# Patient Record
Sex: MALE | Race: WHITE | Employment: OTHER | URBAN - METROPOLITAN AREA
[De-identification: names, ages, dates, MRNs, and addresses within clinical notes are randomized per-mention and may not be internally consistent; named-entity substitution may affect disease eponyms.]

---

## 2018-03-22 ENCOUNTER — HOSPITAL ENCOUNTER (EMERGENCY)
Facility: CLINIC | Age: 83
Discharge: HOME | End: 2018-03-22
Attending: EMERGENCY MEDICINE | Admitting: EMERGENCY MEDICINE
Payer: MEDICARE

## 2018-03-22 ENCOUNTER — APPOINTMENT (OUTPATIENT)
Dept: GENERAL RADIOLOGY | Facility: CLINIC | Age: 83
End: 2018-03-22
Attending: EMERGENCY MEDICINE
Payer: MEDICARE

## 2018-03-22 VITALS
RESPIRATION RATE: 10 BRPM | TEMPERATURE: 98.7 F | DIASTOLIC BLOOD PRESSURE: 73 MMHG | SYSTOLIC BLOOD PRESSURE: 144 MMHG | OXYGEN SATURATION: 95 %

## 2018-03-22 DIAGNOSIS — I45.10 RIGHT BUNDLE BRANCH BLOCK: ICD-10-CM

## 2018-03-22 DIAGNOSIS — M25.512 LEFT SHOULDER PAIN: ICD-10-CM

## 2018-03-22 DIAGNOSIS — M62.81 GENERALIZED MUSCLE WEAKNESS: ICD-10-CM

## 2018-03-22 LAB
ALBUMIN SERPL-MCNC: 3.2 G/DL (ref 3.4–5)
ALP SERPL-CCNC: 74 U/L (ref 40–150)
ALT SERPL W P-5'-P-CCNC: 27 U/L (ref 0–70)
ANION GAP SERPL CALCULATED.3IONS-SCNC: 9 MMOL/L (ref 3–14)
AST SERPL W P-5'-P-CCNC: 25 U/L (ref 0–45)
BASOPHILS # BLD AUTO: 0 10E9/L (ref 0–0.2)
BASOPHILS NFR BLD AUTO: 0.6 %
BILIRUB SERPL-MCNC: 1.5 MG/DL (ref 0.2–1.3)
BUN SERPL-MCNC: 19 MG/DL (ref 7–30)
CALCIUM SERPL-MCNC: 8.5 MG/DL (ref 8.5–10.1)
CHLORIDE SERPL-SCNC: 104 MMOL/L (ref 94–109)
CO2 SERPL-SCNC: 23 MMOL/L (ref 20–32)
CREAT SERPL-MCNC: 0.98 MG/DL (ref 0.66–1.25)
DIFFERENTIAL METHOD BLD: NORMAL
EOSINOPHIL # BLD AUTO: 0.1 10E9/L (ref 0–0.7)
EOSINOPHIL NFR BLD AUTO: 1.9 %
ERYTHROCYTE [DISTWIDTH] IN BLOOD BY AUTOMATED COUNT: 12.3 % (ref 10–15)
GFR SERPL CREATININE-BSD FRML MDRD: 73 ML/MIN/1.7M2
GLUCOSE SERPL-MCNC: 258 MG/DL (ref 70–99)
HCT VFR BLD AUTO: 48.4 % (ref 40–53)
HGB BLD-MCNC: 16.8 G/DL (ref 13.3–17.7)
IMM GRANULOCYTES # BLD: 0 10E9/L (ref 0–0.4)
IMM GRANULOCYTES NFR BLD: 0.3 %
LYMPHOCYTES # BLD AUTO: 1.3 10E9/L (ref 0.8–5.3)
LYMPHOCYTES NFR BLD AUTO: 18.5 %
MCH RBC QN AUTO: 30.9 PG (ref 26.5–33)
MCHC RBC AUTO-ENTMCNC: 34.7 G/DL (ref 31.5–36.5)
MCV RBC AUTO: 89 FL (ref 78–100)
MONOCYTES # BLD AUTO: 0.6 10E9/L (ref 0–1.3)
MONOCYTES NFR BLD AUTO: 8.3 %
NEUTROPHILS # BLD AUTO: 5.1 10E9/L (ref 1.6–8.3)
NEUTROPHILS NFR BLD AUTO: 70.4 %
PLATELET # BLD AUTO: 159 10E9/L (ref 150–450)
POTASSIUM SERPL-SCNC: 4.4 MMOL/L (ref 3.4–5.3)
PROT SERPL-MCNC: 7 G/DL (ref 6.8–8.8)
RBC # BLD AUTO: 5.43 10E12/L (ref 4.4–5.9)
SODIUM SERPL-SCNC: 136 MMOL/L (ref 133–144)
TROPONIN I SERPL-MCNC: <0.015 UG/L (ref 0–0.04)
TROPONIN I SERPL-MCNC: <0.015 UG/L (ref 0–0.04)
WBC # BLD AUTO: 7.2 10E9/L (ref 4–11)

## 2018-03-22 PROCEDURE — 80053 COMPREHEN METABOLIC PANEL: CPT | Performed by: EMERGENCY MEDICINE

## 2018-03-22 PROCEDURE — 71046 X-RAY EXAM CHEST 2 VIEWS: CPT

## 2018-03-22 PROCEDURE — 99285 EMERGENCY DEPT VISIT HI MDM: CPT | Mod: 25

## 2018-03-22 PROCEDURE — 93010 ELECTROCARDIOGRAM REPORT: CPT | Mod: Z6 | Performed by: EMERGENCY MEDICINE

## 2018-03-22 PROCEDURE — 84484 ASSAY OF TROPONIN QUANT: CPT | Performed by: EMERGENCY MEDICINE

## 2018-03-22 PROCEDURE — 85025 COMPLETE CBC W/AUTO DIFF WBC: CPT | Performed by: EMERGENCY MEDICINE

## 2018-03-22 PROCEDURE — 99285 EMERGENCY DEPT VISIT HI MDM: CPT | Mod: 25 | Performed by: EMERGENCY MEDICINE

## 2018-03-22 PROCEDURE — 93005 ELECTROCARDIOGRAM TRACING: CPT

## 2018-03-22 RX ORDER — ONDANSETRON 4 MG/1
4 TABLET, ORALLY DISINTEGRATING ORAL EVERY 8 HOURS PRN
Qty: 10 TABLET | Refills: 0 | Status: SHIPPED | OUTPATIENT
Start: 2018-03-22 | End: 2018-03-25

## 2018-03-22 NOTE — DISCHARGE INSTRUCTIONS
Return to the emergency department if you have worsening symptoms, difficulty breathing, repeated vomiting, fever, or other concerns.  Otherwise follow-up in clinic in 1-2 weeks for a recheck.

## 2018-03-22 NOTE — ED AVS SNAPSHOT
[facility] Emergency Department    [address] [address]    Phone:  [phone]    Fax:  [phone]                                       Jhoan Curran   MRN: [id number]    Department:  [facility] Emergency Department   Date of Visit:  3/22/2018           After Visit Summary Signature Page     I have received my discharge instructions, and my questions have been answered. I have discussed any challenges I see with this plan with the nurse or doctor.    ..........................................................................................................................................  Patient/Patient Representative Signature      ..........................................................................................................................................  Patient Representative Print Name and Relationship to Patient    ..................................................               ................................................  Date                                            Time    ..........................................................................................................................................  Reviewed by Signature/Title    ...................................................              ..............................................  Date                                                            Time

## 2018-03-22 NOTE — ED AVS SNAPSHOT
[facility] Emergency Department    [address] [address]    Phone:  [phone]    Fax:  [phone]                                       Jhoan Curran   MRN: [id number]    Department:  [facility] Emergency Department   Date of Visit:  3/22/2018           Patient Information     Date Of Birth          2/19/1932        Your diagnoses for this visit were:     Generalized muscle weakness        You were seen by Raffi Triplett MD.        Discharge Instructions       Return to the emergency department if you have worsening symptoms, difficulty breathing, repeated vomiting, fever, or other concerns.  Otherwise follow-up in clinic in 1-2 weeks for a recheck.    24 Hour Appointment Hotline       To make an appointment at any [facility], call [phone] ([phone]). If you don't have a family doctor or clinic, we will help you find one. [place] clinics are conveniently located to serve the needs of you and your family.             Review of your medicines      START taking        Dose / Directions Last dose taken    ondansetron 4 MG ODT tab   Commonly known as:  ZOFRAN ODT   Dose:  4 mg   Quantity:  10 tablet        Take 1 tablet (4 mg) by mouth every 8 hours as needed   Refills:  0          Our records show that you are taking the medicines listed below. If these are incorrect, please call your family doctor or clinic.        Dose / Directions Last dose taken    atenolol 25 MG tablet   Commonly known as:  TENORMIN   Dose:  25 mg        Take 25 mg by mouth daily   Refills:  0        BABY ASPIRIN 81 MG chewable tablet   Dose:  81 mg   Generic drug:  aspirin        Take 81 mg by mouth daily.   Refills:  0        fish oil-omega-3 fatty acids 1000 MG capsule   Dose:  2 g        Take 2 g by mouth daily.   Refills:  0        insulin aspart 100 UNITS/ML injection   Commonly known as:  NovoLOG        Inject  Subcutaneous See Admin Instructions. dispense flex pen   Refills:   0        niacin 25 mg half-tablet   Dose:  50 mg        Take 50 mg by mouth daily (with breakfast).   Refills:  0        OMEPRAZOLE PO   Dose:  20 mg        Take 20 mg by mouth   Refills:  0        VALSARTAN PO   Dose:  320 mg        Take 320 mg by mouth   Refills:  0        ZOCOR PO   Dose:  40 mg        Take 40 mg by mouth   Refills:  0                Prescriptions were sent or printed at these locations (1 Prescription)                   [place], [place] - [address]   [address] [address]    Telephone:  [phone]   Fax:  [phone]   Hours:                  E-Prescribed (1 of 1)         ondansetron (ZOFRAN ODT) 4 MG ODT tab                Procedures and tests performed during your visit     Procedure/Test Number of Times Performed    CBC with platelets differential 1    Comprehensive metabolic panel 1    EKG 12-lead, tracing only 1    Troponin I 2    XR Chest 2 Views 1      Orders Needing Specimen Collection     None      Pending Results     No orders found from 3/20/2018 to 3/23/2018.            Pending Culture Results     No orders found from 3/20/2018 to 3/23/2018.            Pending Results Instructions     If you had any lab results that were not finalized at the time of your Discharge, you can call the ED Lab Result RN at [phone]. You will be contacted by this team for any positive Lab results or changes in treatment. The nurses are available 7 days a week from 10A to 6:30P.  You can leave a message 24 hours per day and they will return your call.        Test Results From Your Hospital Stay        3/22/2018 10:12 AM      Component Results     Component Value Ref Range & Units Status    WBC 7.2 4.0 - 11.0 10e9/L Final    RBC Count 5.43 4.4 - 5.9 10e12/L Final    Hemoglobin 16.8 13.3 - 17.7 g/dL Final    Hematocrit 48.4 40.0 - 53.0 % Final    MCV 89 78 - 100 fl Final    MCH 30.9 26.5 - 33.0 pg Final    MCHC 34.7 31.5 - 36.5 g/dL Final    RDW 12.3 10.0  - 15.0 % Final    Platelet Count 159 150 - 450 10e9/L Final    Diff Method Automated Method  Final    % Neutrophils 70.4 % Final    % Lymphocytes 18.5 % Final    % Monocytes 8.3 % Final    % Eosinophils 1.9 % Final    % Basophils 0.6 % Final    % Immature Granulocytes 0.3 % Final    Absolute Neutrophil 5.1 1.6 - 8.3 10e9/L Final    Absolute Lymphocytes 1.3 0.8 - 5.3 10e9/L Final    Absolute Monocytes 0.6 0.0 - 1.3 10e9/L Final    Absolute Eosinophils 0.1 0.0 - 0.7 10e9/L Final    Absolute Basophils 0.0 0.0 - 0.2 10e9/L Final    Abs Immature Granulocytes 0.0 0 - 0.4 10e9/L Final         3/22/2018 10:32 AM      Component Results     Component Value Ref Range & Units Status    Sodium 136 133 - 144 mmol/L Final    Potassium 4.4 3.4 - 5.3 mmol/L Final    Chloride 104 94 - 109 mmol/L Final    Carbon Dioxide 23 20 - 32 mmol/L Final    Anion Gap 9 3 - 14 mmol/L Final    Glucose 258 (H) 70 - 99 mg/dL Final    Urea Nitrogen 19 7 - 30 mg/dL Final    Creatinine 0.98 0.66 - 1.25 mg/dL Final    GFR Estimate 73 >60 mL/min/1.7m2 Final    Non  GFR Calc    GFR Estimate If Black 88 >60 mL/min/1.7m2 Final    African American GFR Calc    Calcium 8.5 8.5 - 10.1 mg/dL Final    Bilirubin Total 1.5 (H) 0.2 - 1.3 mg/dL Final    Albumin 3.2 (L) 3.4 - 5.0 g/dL Final    Protein Total 7.0 6.8 - 8.8 g/dL Final    Alkaline Phosphatase 74 40 - 150 U/L Final    ALT 27 0 - 70 U/L Final    AST 25 0 - 45 U/L Final         3/22/2018 10:33 AM      Component Results     Component Value Ref Range & Units Status    Troponin I ES <0.015 0.000 - 0.045 ug/L Final    The 99th percentile for upper reference range is 0.045 ug/L.  Troponin values   in the range of 0.045 - 0.120 ug/L may be associated with risks of adverse   clinical events.           3/22/2018 10:56 AM      Narrative     CHEST TWO VIEWS March 22, 2018 10:27 AM     HISTORY: \Weakness.    COMPARISON: 2/24/2009.    FINDINGS: Median sternotomy wires again noted. There has been  "prior  coronary artery bypass. Heart size and pulmonary vascularity are  within normal limits. The lungs are clear. No pneumothorax or pleural  effusion.         Impression     IMPRESSION: No radiographic evidence of acute chest abnormality.     CHAZ THORPE MD         3/22/2018  1:41 PM      Component Results     Component Value Ref Range & Units Status    Troponin I ES <0.015 0.000 - 0.045 ug/L Final    The 99th percentile for upper reference range is 0.045 ug/L.  Troponin values   in the range of 0.045 - 0.120 ug/L may be associated with risks of adverse   clinical events.                  Thank you for choosing [facility]       Thank you for choosing [facility] for your care. Our goal is always to provide you with excellent care. Hearing back from our patients is one way we can continue to improve our services. Please take a few minutes to complete the written survey that you may receive in the mail after you visit with us. Thank you!        [employer] Information     [employer] lets you send messages to your doctor, view your test results, renew your prescriptions, schedule appointments and more. To sign up, go to [email or web address] . Click on \"Log in\" on the left side of the screen, which will take you to the Welcome page. Then click on \"Sign up Now\" on the right side of the page.     You will be asked to enter the access code listed below, as well as some personal information. Please follow the directions to create your username and password.     Your access code is: [email or web address]  Expires: 2018  2:04 PM     Your access code will  in 90 days. If you need help or a new code, please call your [facility] clinic or [phone].        Care EveryWhere ID     This is your Care EveryWhere ID. This could be used by other organizations to access your [facility] medical records  [id number]        Equal Access to Services     ANDREAS HIDALGO AH: izaiah Moraes, raoul kohli " lesley miranda ah. So [facility] [phone].    ATENCIÓN: Si habla español, tiene a eduardo disposición servicios gratuitos de asistencia lingüística. Llame al [phone].    We comply with applicable federal civil rights laws and [place] laws. We do not discriminate on the basis of race, color, national origin, age, disability, sex, sexual orientation, or gender identity.            After Visit Summary       This is your record. Keep this with you and show to your community pharmacist(s) and doctor(s) at your next visit.

## 2018-03-22 NOTE — ED PROVIDER NOTES
History     Chief Complaint   Patient presents with     Generalized Weakness     HPI  Jhoan Curran is a 86 year old male who presents for generalized weakness and left shoulder pain.  History obtained from the patient and EMS.  Patient says that he has been feeling more run down over the past several weeks to months and has been having intermittent shoulder pain that he woke up with the shoulder pain on the left side this morning, nonradiating, slightly increased with use of the shoulder but denies any injury.  He has not taken anything for pain.  Pain is described as aching and pressure, rated as moderate.  Denies any fever, difficulty breathing, cough, abdominal pain, nausea, vomiting, diarrhea, dysuria, or rash.    Problem List:    Patient Active Problem List    Diagnosis Date Noted     Foreskin does not retract 02/11/2015     Priority: Medium        Past Medical History:    No past medical history on file.    Past Surgical History:    Past Surgical History:   Procedure Laterality Date     CIRCUMCISION N/A 3/19/2015    Procedure: CIRCUMCISION;  Surgeon: Bhargav Price MD;  Location: [facility] OR     GENITOURINARY SURGERY         Family History:    No family history on file.    Social History:  Marital Status:   [5]  Social History   Substance Use Topics     Smoking status: Never Smoker     Smokeless tobacco: Never Used     Alcohol use No        Medications:      ondansetron (ZOFRAN ODT) 4 MG ODT tab   atenolol (TENORMIN) 25 MG tablet   OMEPRAZOLE PO   Simvastatin (ZOCOR PO)   VALSARTAN PO   insulin aspart (NovoLOG) injection    fish oil-omega-3 fatty acids (FISH OIL) 1000 MG capsule   niacin 25 MG half-tablet   aspirin (BABY ASPIRIN) 81 MG chewable tablet         Review of Systems  Pertinent positives and negatives listed in the HPI, all other systems reviewed and are negative.    Physical Exam   BP: (!) 186/101  Heart Rate: 68  Temp: 98.7  F (37.1  C)  Resp: 16  SpO2: 92 %      Physical Exam    Constitutional: He is oriented to person, place, and time. He appears well-developed and well-nourished. He appears distressed.   HENT:   Head: Normocephalic and atraumatic.   Right Ear: External ear normal.   Left Ear: External ear normal.   Nose: Nose normal.   Eyes: Conjunctivae are normal. No scleral icterus.   Neck: Normal range of motion.   Cardiovascular: Normal rate and regular rhythm.    No murmur heard.  Pulses:       Radial pulses are 2+ on the right side, and 2+ on the left side.        Dorsalis pedis pulses are 2+ on the left side.   Pulmonary/Chest: Effort normal. No stridor. No respiratory distress. He has no wheezes.   Abdominal: Soft. He exhibits no distension. There is no tenderness. There is no rebound.   Neurological: He is alert and oriented to person, place, and time.   Skin: Skin is warm and dry. He is not diaphoretic.   Psychiatric: He has a normal mood and affect. His behavior is normal.   Nursing note and vitals reviewed.      ED Course     ED Course     Procedures               EKG Interpretation:      Interpreted by Raffi Triplett  Time reviewed: 1000  Symptoms at time of EKG: Weakness   Rhythm: 1 degree AV block  Rate: Normal  Axis: Left Axis Deviation  Ectopy: none  Conduction: right bundle branch block (complete)  ST Segments/ T Waves: No acute ischemic changes  Comparison to prior: No old EKG available    Clinical Impression: RBBB      Critical Care time:  none               Results for orders placed or performed during the hospital encounter of 03/22/18 (from the past 24 hour(s))   CBC with platelets differential   Result Value Ref Range    WBC 7.2 4.0 - 11.0 10e9/L    RBC Count 5.43 4.4 - 5.9 10e12/L    Hemoglobin 16.8 13.3 - 17.7 g/dL    Hematocrit 48.4 40.0 - 53.0 %    MCV 89 78 - 100 fl    MCH 30.9 26.5 - 33.0 pg    MCHC 34.7 31.5 - 36.5 g/dL    RDW 12.3 10.0 - 15.0 %    Platelet Count 159 150 - 450 10e9/L    Diff Method Automated Method     % Neutrophils 70.4 %    %  Lymphocytes 18.5 %    % Monocytes 8.3 %    % Eosinophils 1.9 %    % Basophils 0.6 %    % Immature Granulocytes 0.3 %    Absolute Neutrophil 5.1 1.6 - 8.3 10e9/L    Absolute Lymphocytes 1.3 0.8 - 5.3 10e9/L    Absolute Monocytes 0.6 0.0 - 1.3 10e9/L    Absolute Eosinophils 0.1 0.0 - 0.7 10e9/L    Absolute Basophils 0.0 0.0 - 0.2 10e9/L    Abs Immature Granulocytes 0.0 0 - 0.4 10e9/L   Comprehensive metabolic panel   Result Value Ref Range    Sodium 136 133 - 144 mmol/L    Potassium 4.4 3.4 - 5.3 mmol/L    Chloride 104 94 - 109 mmol/L    Carbon Dioxide 23 20 - 32 mmol/L    Anion Gap 9 3 - 14 mmol/L    Glucose 258 (H) 70 - 99 mg/dL    Urea Nitrogen 19 7 - 30 mg/dL    Creatinine 0.98 0.66 - 1.25 mg/dL    GFR Estimate 73 >60 mL/min/1.7m2    GFR Estimate If Black 88 >60 mL/min/1.7m2    Calcium 8.5 8.5 - 10.1 mg/dL    Bilirubin Total 1.5 (H) 0.2 - 1.3 mg/dL    Albumin 3.2 (L) 3.4 - 5.0 g/dL    Protein Total 7.0 6.8 - 8.8 g/dL    Alkaline Phosphatase 74 40 - 150 U/L    ALT 27 0 - 70 U/L    AST 25 0 - 45 U/L   Troponin I   Result Value Ref Range    Troponin I ES <0.015 0.000 - 0.045 ug/L   XR Chest 2 Views    Narrative    CHEST TWO VIEWS March 22, 2018 10:27 AM     HISTORY: \Weakness.    COMPARISON: 2/24/2009.    FINDINGS: Median sternotomy wires again noted. There has been prior  coronary artery bypass. Heart size and pulmonary vascularity are  within normal limits. The lungs are clear. No pneumothorax or pleural  effusion.       Impression    IMPRESSION: No radiographic evidence of acute chest abnormality.     CHAZ THORPE MD   Troponin I   Result Value Ref Range    Troponin I ES <0.015 0.000 - 0.045 ug/L       Medications - No data to display    Assessments & Plan (with Medical Decision Making)   86-year-old male who presents for generalized weakness.  Temperature is 98.7 F, SPO2 95% on room air, blood pressure 144/73, heart rate 65.  EKG shows right bundle branch block without signs of ischemia.  Initial troponin is  normal.  Electrodes are within normal limits.  Blood cell count and hemoglobin normal.  Chest x-ray obtained, images reviewed independently as well as radiology read reviewed, no signs of infiltrate to suggest pneumonia or pneumothorax.  The patient is doing well here, comfortable, tolerating oral intake, breathing comfortably, and he is ambulating without difficulty around the emergency department.  Repeat troponin is also normal.  With normal EKGs and pain that is very atypical for coronary artery disease, I believe that he is safe to discharge home at this point with instructions to return if worse, otherwise follow-up in clinic.  The patient is in agreement with this plan.  He did request a prescription for Zofran as he says that this has helped him in the past, I asked him whether he is nauseated now and he says no matter all.  I did provide a prescription for him    I have reviewed the nursing notes.    I have reviewed the findings, diagnosis, plan and need for follow up with the patient.       Discharge Medication List as of 3/22/2018  2:21 PM      START taking these medications    Details   ondansetron (ZOFRAN ODT) 4 MG ODT tab Take 1 tablet (4 mg) by mouth every 8 hours as needed, Disp-10 tablet, R-0, E-Prescribe             Final diagnoses:   Generalized muscle weakness       3/22/2018   [facility] EMERGENCY DEPARTMENT     Raffi Triplett MD  03/22/18 1746

## 2018-03-22 NOTE — ED NOTES
Pt up this morning, feeling ok, states he was shaving and started feeling weak suddenly, and having L arm pain.  Felt like he couldn't keep his arms up to shave.  Pt alert and oriented now, states pain is gone and he is feeling ok.  Denies nausea.  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

## 2019-02-06 ENCOUNTER — HOSPITAL ENCOUNTER (OUTPATIENT)
Dept: GENERAL RADIOLOGY | Facility: CLINIC | Age: 84
Discharge: HOME | End: 2019-02-06
Attending: FAMILY MEDICINE | Admitting: FAMILY MEDICINE
Payer: COMMERCIAL

## 2019-02-06 DIAGNOSIS — M19.012 OSTEOARTHRITIS OF GLENOHUMERAL JOINT, LEFT: ICD-10-CM

## 2019-02-06 PROCEDURE — 20610 DRAIN/INJ JOINT/BURSA W/O US: CPT | Mod: LT

## 2019-02-06 PROCEDURE — 25000125 ZZHC RX 250: Performed by: FAMILY MEDICINE

## 2019-02-06 PROCEDURE — 25000128 H RX IP 250 OP 636: Performed by: FAMILY MEDICINE

## 2019-02-06 PROCEDURE — 25500064 ZZH RX 255 OP 636: Performed by: FAMILY MEDICINE

## 2019-02-06 RX ORDER — TRIAMCINOLONE ACETONIDE 40 MG/ML
40 INJECTION, SUSPENSION INTRA-ARTICULAR; INTRAMUSCULAR ONCE
Status: COMPLETED | OUTPATIENT
Start: 2019-02-06 | End: 2019-02-06

## 2019-02-06 RX ORDER — BUPIVACAINE HYDROCHLORIDE 5 MG/ML
5 INJECTION, SOLUTION PERINEURAL ONCE
Status: COMPLETED | OUTPATIENT
Start: 2019-02-06 | End: 2019-02-06

## 2019-02-06 RX ORDER — IOPAMIDOL 408 MG/ML
10 INJECTION, SOLUTION INTRATHECAL ONCE
Status: COMPLETED | OUTPATIENT
Start: 2019-02-06 | End: 2019-02-06

## 2019-02-06 RX ORDER — LIDOCAINE HYDROCHLORIDE 10 MG/ML
5 INJECTION, SOLUTION EPIDURAL; INFILTRATION; INTRACAUDAL; PERINEURAL ONCE
Status: COMPLETED | OUTPATIENT
Start: 2019-02-06 | End: 2019-02-06

## 2019-02-06 RX ADMIN — BUPIVACAINE HYDROCHLORIDE 4 MG: 5 INJECTION, SOLUTION EPIDURAL; INTRACAUDAL at 12:52

## 2019-02-06 RX ADMIN — LIDOCAINE HYDROCHLORIDE 5 ML: 10 INJECTION, SOLUTION EPIDURAL; INFILTRATION; INTRACAUDAL; PERINEURAL at 12:53

## 2019-02-06 RX ADMIN — TRIAMCINOLONE ACETONIDE 40 MG: 40 INJECTION, SUSPENSION INTRA-ARTICULAR; INTRAMUSCULAR at 12:51

## 2019-02-06 RX ADMIN — IOPAMIDOL 1 ML: 408 INJECTION, SOLUTION INTRATHECAL at 12:53

## 2020-06-15 ENCOUNTER — APPOINTMENT (OUTPATIENT)
Dept: CT IMAGING | Facility: CLINIC | Age: 85
End: 2020-06-15
Attending: EMERGENCY MEDICINE
Payer: COMMERCIAL

## 2020-06-15 ENCOUNTER — HOSPITAL ENCOUNTER (EMERGENCY)
Facility: CLINIC | Age: 85
Discharge: ANOTHER HOSPITAL | End: 2020-06-15
Attending: EMERGENCY MEDICINE | Admitting: EMERGENCY MEDICINE
Payer: COMMERCIAL

## 2020-06-15 VITALS
OXYGEN SATURATION: 96 % | BODY MASS INDEX: 30.31 KG/M2 | HEIGHT: 68 IN | HEART RATE: 58 BPM | DIASTOLIC BLOOD PRESSURE: 55 MMHG | SYSTOLIC BLOOD PRESSURE: 137 MMHG | WEIGHT: 200 LBS | RESPIRATION RATE: 18 BRPM | TEMPERATURE: 97.5 F

## 2020-06-15 DIAGNOSIS — N20.1 URETEROLITHIASIS: ICD-10-CM

## 2020-06-15 LAB
ALBUMIN SERPL-MCNC: 3.4 G/DL (ref 3.4–5)
ALP SERPL-CCNC: 81 U/L (ref 40–150)
ALT SERPL W P-5'-P-CCNC: 32 U/L (ref 0–70)
ANION GAP SERPL CALCULATED.3IONS-SCNC: 6 MMOL/L (ref 3–14)
AST SERPL W P-5'-P-CCNC: 29 U/L (ref 0–45)
BASOPHILS # BLD AUTO: 0.1 10E9/L (ref 0–0.2)
BASOPHILS NFR BLD AUTO: 0.9 %
BILIRUB SERPL-MCNC: 1.2 MG/DL (ref 0.2–1.3)
BUN SERPL-MCNC: 24 MG/DL (ref 7–30)
CALCIUM SERPL-MCNC: 9 MG/DL (ref 8.5–10.1)
CHLORIDE SERPL-SCNC: 106 MMOL/L (ref 94–109)
CO2 SERPL-SCNC: 26 MMOL/L (ref 20–32)
CREAT SERPL-MCNC: 1.31 MG/DL (ref 0.66–1.25)
DIFFERENTIAL METHOD BLD: NORMAL
EOSINOPHIL # BLD AUTO: 0 10E9/L (ref 0–0.7)
EOSINOPHIL NFR BLD AUTO: 0.3 %
ERYTHROCYTE [DISTWIDTH] IN BLOOD BY AUTOMATED COUNT: 12.3 % (ref 10–15)
GFR SERPL CREATININE-BSD FRML MDRD: 48 ML/MIN/{1.73_M2}
GLUCOSE BLDC GLUCOMTR-MCNC: 156 MG/DL (ref 70–99)
GLUCOSE SERPL-MCNC: 159 MG/DL (ref 70–99)
HCT VFR BLD AUTO: 48.9 % (ref 40–53)
HGB BLD-MCNC: 16.2 G/DL (ref 13.3–17.7)
IMM GRANULOCYTES # BLD: 0.1 10E9/L (ref 0–0.4)
IMM GRANULOCYTES NFR BLD: 0.6 %
LIPASE SERPL-CCNC: 87 U/L (ref 73–393)
LYMPHOCYTES # BLD AUTO: 1.2 10E9/L (ref 0.8–5.3)
LYMPHOCYTES NFR BLD AUTO: 14.1 %
MCH RBC QN AUTO: 31.3 PG (ref 26.5–33)
MCHC RBC AUTO-ENTMCNC: 33.1 G/DL (ref 31.5–36.5)
MCV RBC AUTO: 95 FL (ref 78–100)
MONOCYTES # BLD AUTO: 0.5 10E9/L (ref 0–1.3)
MONOCYTES NFR BLD AUTO: 5.4 %
NEUTROPHILS # BLD AUTO: 6.9 10E9/L (ref 1.6–8.3)
NEUTROPHILS NFR BLD AUTO: 78.7 %
NRBC # BLD AUTO: 0 10*3/UL
NRBC BLD AUTO-RTO: 0 /100
PLATELET # BLD AUTO: 178 10E9/L (ref 150–450)
POTASSIUM SERPL-SCNC: 5.2 MMOL/L (ref 3.4–5.3)
PROT SERPL-MCNC: 7.1 G/DL (ref 6.8–8.8)
RBC # BLD AUTO: 5.17 10E12/L (ref 4.4–5.9)
SODIUM SERPL-SCNC: 138 MMOL/L (ref 133–144)
WBC # BLD AUTO: 8.8 10E9/L (ref 4–11)

## 2020-06-15 PROCEDURE — 25800030 ZZH RX IP 258 OP 636: Performed by: EMERGENCY MEDICINE

## 2020-06-15 PROCEDURE — 99285 EMERGENCY DEPT VISIT HI MDM: CPT | Mod: Z6 | Performed by: EMERGENCY MEDICINE

## 2020-06-15 PROCEDURE — 99285 EMERGENCY DEPT VISIT HI MDM: CPT | Mod: 25 | Performed by: EMERGENCY MEDICINE

## 2020-06-15 PROCEDURE — 74176 CT ABD & PELVIS W/O CONTRAST: CPT

## 2020-06-15 PROCEDURE — 25000128 H RX IP 250 OP 636: Performed by: EMERGENCY MEDICINE

## 2020-06-15 PROCEDURE — 85025 COMPLETE CBC W/AUTO DIFF WBC: CPT | Performed by: EMERGENCY MEDICINE

## 2020-06-15 PROCEDURE — 96375 TX/PRO/DX INJ NEW DRUG ADDON: CPT | Performed by: EMERGENCY MEDICINE

## 2020-06-15 PROCEDURE — 83690 ASSAY OF LIPASE: CPT | Performed by: EMERGENCY MEDICINE

## 2020-06-15 PROCEDURE — 80053 COMPREHEN METABOLIC PANEL: CPT | Performed by: EMERGENCY MEDICINE

## 2020-06-15 PROCEDURE — 96361 HYDRATE IV INFUSION ADD-ON: CPT | Performed by: EMERGENCY MEDICINE

## 2020-06-15 PROCEDURE — 00000146 ZZHCL STATISTIC GLUCOSE BY METER IP

## 2020-06-15 PROCEDURE — 96365 THER/PROPH/DIAG IV INF INIT: CPT | Performed by: EMERGENCY MEDICINE

## 2020-06-15 RX ORDER — ONDANSETRON 2 MG/ML
4 INJECTION INTRAMUSCULAR; INTRAVENOUS EVERY 30 MIN PRN
Status: DISCONTINUED | OUTPATIENT
Start: 2020-06-15 | End: 2020-06-15 | Stop reason: HOSPADM

## 2020-06-15 RX ORDER — HYDROMORPHONE HYDROCHLORIDE 1 MG/ML
0.3 INJECTION, SOLUTION INTRAMUSCULAR; INTRAVENOUS; SUBCUTANEOUS
Status: COMPLETED | OUTPATIENT
Start: 2020-06-15 | End: 2020-06-15

## 2020-06-15 RX ORDER — CEFTRIAXONE SODIUM 1 G/50ML
1 INJECTION, SOLUTION INTRAVENOUS EVERY 24 HOURS
Status: DISCONTINUED | OUTPATIENT
Start: 2020-06-15 | End: 2020-06-15 | Stop reason: HOSPADM

## 2020-06-15 RX ADMIN — CEFTRIAXONE SODIUM 1 G: 1 INJECTION, SOLUTION INTRAVENOUS at 16:36

## 2020-06-15 RX ADMIN — ONDANSETRON 4 MG: 2 INJECTION INTRAMUSCULAR; INTRAVENOUS at 14:09

## 2020-06-15 RX ADMIN — SODIUM CHLORIDE 500 ML: 9 INJECTION, SOLUTION INTRAVENOUS at 14:10

## 2020-06-15 RX ADMIN — HYDROMORPHONE HYDROCHLORIDE 0.3 MG: 1 INJECTION, SOLUTION INTRAMUSCULAR; INTRAVENOUS; SUBCUTANEOUS at 14:09

## 2020-06-15 ASSESSMENT — MIFFLIN-ST. JEOR: SCORE: 1551.69

## 2020-06-15 NOTE — ED NOTES
"Pt arrives in wheelchair from triage following an episode of flank pain this am at 6am. Left flank pain, \"stomachache and nausea.\" Hx of stones, passing urine last at 10am. Lives at home, alone \" sometimes my son comes and stays with me\" Tylenol at 1100, helped with pain. Did not eat lunch today. 7/10 pain currently. Points to LLQ, and wraps around. \" I have prostate problems too\"    "

## 2020-06-15 NOTE — ED PROVIDER NOTES
History     Chief Complaint   Patient presents with     Flank Pain     left flank pain today, hx of kidney stones     HPI  Jhoan Curran is a 88 year old male who presents with left flank pain radiating to left lower quadrant and groin, onset this morning about 10:00.  Son drove him over here, has associated nausea without vomiting.  Denies associated fever.  Describes pain as 7/10, no exacerbating or alleviating factors.  Similar symptoms in the past of kidney stone, he has had open stone resection in the past, lithotripsy and stenting of his ureters.  Denies gross hematuria or dysuria.  Denies change in bowel.    Allergies:  Allergies   Allergen Reactions     Atorvastatin Other (See Comments)     Body aches     Hmg-Coa-R Inhibitors      cough     Iodine      nausea     Lisinopril Other (See Comments)     Cough         Problem List:    Patient Active Problem List    Diagnosis Date Noted     Foreskin does not retract 02/11/2015     Priority: Medium        Past Medical History:    No past medical history on file.    Past Surgical History:    Past Surgical History:   Procedure Laterality Date     CIRCUMCISION N/A 3/19/2015    Procedure: CIRCUMCISION;  Surgeon: Bhargav Price MD;  Location: [facility] OR     GENITOURINARY SURGERY         Family History:    No family history on file.    Social History:  Marital Status:   [5]  Social History     Tobacco Use     Smoking status: Never Smoker     Smokeless tobacco: Never Used   Substance Use Topics     Alcohol use: No     Drug use: No        Medications:    aspirin (BABY ASPIRIN) 81 MG chewable tablet  atenolol (TENORMIN) 25 MG tablet  fish oil-omega-3 fatty acids (FISH OIL) 1000 MG capsule  insulin aspart (NovoLOG) injection   niacin 25 MG half-tablet  OMEPRAZOLE PO  Simvastatin (ZOCOR PO)  VALSARTAN PO          Review of Systems  All other systems reviewed and are negative.    Physical Exam   BP: 115/54  Pulse: 58  Heart Rate: 55  Temp: 97.5  F (36.4  " C)  Resp: 18  Height: 172.7 cm (5' 8\")  Weight: 90.7 kg (200 lb)  SpO2: 96 %      Physical Exam  Nontoxic appearing no respiratory distress alert and oriented ×3  Head atraumatic normocephalic  Lungs clear to auscultation  Heart regular no murmur  Abdomen soft mild tenderness left lower quadrant without guarding or rebound bowel sounds positive   Strength and sensation grossly intact throughout the extremities, gait and station normal  Speech is fluent, good eye contact, thought processes are rational      ED Course        Procedures                 Critical Care time:  none     Results for orders placed or performed during the hospital encounter of 06/15/20   CT Abdomen Pelvis w/o Contrast     Status: None    Narrative    CT ABDOMEN AND PELVIS WITHOUT CONTRAST 6/15/2020 2:01 PM    CLINICAL HISTORY: Flank pain, recurrent stone disease suspected.    TECHNIQUE: CT scan of the abdomen and pelvis was performed without IV  contrast. Multiplanar reformats were obtained. Dose reduction  techniques were used.  CONTRAST: None.    COMPARISON: CT abdomen and pelvis 3/10/2010.    FINDINGS:   LOWER CHEST: Normal.    HEPATOBILIARY: Cholecystectomy changes. Liver is otherwise  unremarkable. Incidental colonic interposition is noted.    PANCREAS: Normal.    SPLEEN: Normal.    ADRENAL GLANDS: Normal.    KIDNEYS/BLADDER: Tiny nonobstructing right renal collecting system  stone measures 0.2 cm on series 3, image 94. No evidence of right  hydronephrosis or hydroureter. There is left hydronephrosis and  proximal hydroureter due to an obstructing mid left ureteral stone on  image 114 measuring 0.5 cm. Proximal left ureteral stones are also  present in the ureter measuring 0.3 cm on image 102 and up to 0.9 cm  just distal to the left UPJ. Numerous additional large nonobstructing  left renal collecting system stones are also noted. Bladder is  obscured by artifact related to bilateral hip replacement hardware.    BOWEL: No bowel " obstruction, diverticulitis or appendicitis. Prominent  duodenal diverticulum noted adjacent to the pancreatic head.    LYMPH NODES: Normal.    VASCULATURE: Extensive calcified plaque involving the aorta and branch  vessels are noted without evidence of aortic aneurysm.    PELVIC ORGANS: Rectum is unremarkable. Bladder and prostate gland are  obscured by artifact as described above.    OTHER: None.    MUSCULOSKELETAL: Degenerative spine changes are present.      Impression    IMPRESSION:   1.  Obstructing left ureteral calculi as described. Left  hydronephrosis and numerous prominent left renal collecting system  stones are noted. Follow-up with urology as clinically warranted.    2.  Tiny nonobstructing right renal collecting system stone. No  evidence of right hydronephrosis.    KEVON ASHTON MD   CBC with platelets differential     Status: None   Result Value Ref Range    WBC 8.8 4.0 - 11.0 10e9/L    RBC Count 5.17 4.4 - 5.9 10e12/L    Hemoglobin 16.2 13.3 - 17.7 g/dL    Hematocrit 48.9 40.0 - 53.0 %    MCV 95 78 - 100 fl    MCH 31.3 26.5 - 33.0 pg    MCHC 33.1 31.5 - 36.5 g/dL    RDW 12.3 10.0 - 15.0 %    Platelet Count 178 150 - 450 10e9/L    Diff Method Automated Method     % Neutrophils 78.7 %    % Lymphocytes 14.1 %    % Monocytes 5.4 %    % Eosinophils 0.3 %    % Basophils 0.9 %    % Immature Granulocytes 0.6 %    Nucleated RBCs 0 0 /100    Absolute Neutrophil 6.9 1.6 - 8.3 10e9/L    Absolute Lymphocytes 1.2 0.8 - 5.3 10e9/L    Absolute Monocytes 0.5 0.0 - 1.3 10e9/L    Absolute Eosinophils 0.0 0.0 - 0.7 10e9/L    Absolute Basophils 0.1 0.0 - 0.2 10e9/L    Abs Immature Granulocytes 0.1 0 - 0.4 10e9/L    Absolute Nucleated RBC 0.0    Comprehensive metabolic panel     Status: Abnormal   Result Value Ref Range    Sodium 138 133 - 144 mmol/L    Potassium 5.2 3.4 - 5.3 mmol/L    Chloride 106 94 - 109 mmol/L    Carbon Dioxide 26 20 - 32 mmol/L    Anion Gap 6 3 - 14 mmol/L    Glucose 159 (H) 70 - 99 mg/dL    Urea  Nitrogen 24 7 - 30 mg/dL    Creatinine 1.31 (H) 0.66 - 1.25 mg/dL    GFR Estimate 48 (L) >60 mL/min/[1.73_m2]    GFR Estimate If Black 56 (L) >60 mL/min/[1.73_m2]    Calcium 9.0 8.5 - 10.1 mg/dL    Bilirubin Total 1.2 0.2 - 1.3 mg/dL    Albumin 3.4 3.4 - 5.0 g/dL    Protein Total 7.1 6.8 - 8.8 g/dL    Alkaline Phosphatase 81 40 - 150 U/L    ALT 32 0 - 70 U/L    AST 29 0 - 45 U/L   Lipase     Status: None   Result Value Ref Range    Lipase 87 73 - 393 U/L   Glucose by meter     Status: Abnormal   Result Value Ref Range    Glucose 156 (H) 70 - 99 mg/dL                 No results found for this or any previous visit (from the past 24 hour(s)).    Medications   HYDROmorphone (PF) (DILAUDID) injection 0.3 mg (0.3 mg Intravenous Given 6/15/20 1409)   0.9% sodium chloride BOLUS (0 mLs Intravenous Stopped 6/15/20 1618)       Assessments & Plan (with Medical Decision Making)  88-year-old male left flank pain details per HPI.  Findings on CT significant for 3 stones left ureter, hydronephrosis.  Never did give us a urine.  Empirically treated 1 g Rocephin.  No evidence for sepsis.  Transfer for urology consult [facility].     I have reviewed the nursing notes.    I have reviewed the findings, diagnosis, plan and need for follow up with the patient.       Discharge Medication List as of 6/15/2020  5:14 PM          Final diagnoses:   Ureterolithiasis       6/15/2020   [facility] EMERGENCY DEPARTMENT     Ross Albert MD  06/17/20 [id number]

## 2020-06-15 NOTE — ED NOTES
[facility] here, report given. Pt rates pain 3/10. Denies need for further pain meds. Pt ambulated to EMS cart. Cane and shirt sent with patient. IV saline locked.

## 2021-02-15 ENCOUNTER — APPOINTMENT (OUTPATIENT)
Dept: ULTRASOUND IMAGING | Facility: CLINIC | Age: 86
DRG: 291 | End: 2021-02-15
Payer: COMMERCIAL

## 2021-02-15 ENCOUNTER — APPOINTMENT (OUTPATIENT)
Dept: CT IMAGING | Facility: CLINIC | Age: 86
DRG: 291 | End: 2021-02-15
Attending: EMERGENCY MEDICINE
Payer: COMMERCIAL

## 2021-02-15 ENCOUNTER — HOSPITAL ENCOUNTER (INPATIENT)
Facility: CLINIC | Age: 86
LOS: 3 days | Discharge: HOME HEALTH | DRG: 291 | End: 2021-02-18
Attending: EMERGENCY MEDICINE | Admitting: INTERNAL MEDICINE
Payer: COMMERCIAL

## 2021-02-15 DIAGNOSIS — I50.1 LEFT HEART FAILURE (H): ICD-10-CM

## 2021-02-15 DIAGNOSIS — I96 GANGRENE OF TOE OF RIGHT FOOT (H): Chronic | ICD-10-CM

## 2021-02-15 DIAGNOSIS — Z11.52 ENCOUNTER FOR SCREENING LABORATORY TESTING FOR SEVERE ACUTE RESPIRATORY SYNDROME CORONAVIRUS 2 (SARS-COV-2): ICD-10-CM

## 2021-02-15 DIAGNOSIS — I50.9 ACUTE DECOMPENSATED HEART FAILURE (H): ICD-10-CM

## 2021-02-15 DIAGNOSIS — I70.261 ATHEROSCLEROSIS OF NATIVE ARTERY OF RIGHT LOWER EXTREMITY WITH GANGRENE (H): Primary | Chronic | ICD-10-CM

## 2021-02-15 PROBLEM — I25.10 CAD (CORONARY ARTERY DISEASE): Chronic | Status: ACTIVE | Noted: 2021-02-15

## 2021-02-15 PROBLEM — Z20.822 COVID-19 RULED OUT: Status: ACTIVE | Noted: 2021-02-15

## 2021-02-15 PROBLEM — I70.229 CRITICAL LOWER LIMB ISCHEMIA (H): Chronic | Status: ACTIVE | Noted: 2021-02-15

## 2021-02-15 PROBLEM — I70.229 CRITICAL LOWER LIMB ISCHEMIA (H): Status: ACTIVE | Noted: 2021-02-15

## 2021-02-15 PROBLEM — R06.02 SOB (SHORTNESS OF BREATH): Status: ACTIVE | Noted: 2021-02-15

## 2021-02-15 PROBLEM — R79.89 ELEVATED TROPONIN: Status: ACTIVE | Noted: 2021-02-15

## 2021-02-15 PROBLEM — J90 BILATERAL PLEURAL EFFUSION: Status: ACTIVE | Noted: 2021-02-15

## 2021-02-15 PROBLEM — J81.1 PULMONARY EDEMA: Status: ACTIVE | Noted: 2021-02-15

## 2021-02-15 PROBLEM — I10 HTN (HYPERTENSION): Chronic | Status: ACTIVE | Noted: 2021-02-15

## 2021-02-15 PROBLEM — I25.10 CAD (CORONARY ARTERY DISEASE): Status: ACTIVE | Noted: 2021-02-15

## 2021-02-15 PROBLEM — J96.01 ACUTE RESPIRATORY FAILURE WITH HYPOXIA AND HYPERCAPNIA (H): Status: ACTIVE | Noted: 2021-02-15

## 2021-02-15 PROBLEM — J96.02 ACUTE RESPIRATORY FAILURE WITH HYPOXIA AND HYPERCAPNIA (H): Status: ACTIVE | Noted: 2021-02-15

## 2021-02-15 PROBLEM — R79.89 ELEVATED BRAIN NATRIURETIC PEPTIDE (BNP) LEVEL: Status: ACTIVE | Noted: 2021-02-15

## 2021-02-15 PROBLEM — I10 HTN (HYPERTENSION): Status: ACTIVE | Noted: 2021-02-15

## 2021-02-15 LAB
ALBUMIN SERPL-MCNC: 2.7 G/DL (ref 3.4–5)
ALP SERPL-CCNC: 87 U/L (ref 40–150)
ALT SERPL W P-5'-P-CCNC: 18 U/L (ref 0–70)
ANION GAP SERPL CALCULATED.3IONS-SCNC: 1 MMOL/L (ref 3–14)
AST SERPL W P-5'-P-CCNC: 15 U/L (ref 0–45)
BASE EXCESS BLDV CALC-SCNC: 4.8 MMOL/L
BASE EXCESS BLDV CALC-SCNC: 7.2 MMOL/L
BASOPHILS # BLD AUTO: 0.1 10E9/L (ref 0–0.2)
BASOPHILS NFR BLD AUTO: 1 %
BILIRUB SERPL-MCNC: 1.2 MG/DL (ref 0.2–1.3)
BUN SERPL-MCNC: 20 MG/DL (ref 7–30)
CALCIUM SERPL-MCNC: 9.2 MG/DL (ref 8.5–10.1)
CHLORIDE SERPL-SCNC: 104 MMOL/L (ref 94–109)
CO2 SERPL-SCNC: 33 MMOL/L (ref 20–32)
CREAT SERPL-MCNC: 0.9 MG/DL (ref 0.66–1.25)
D DIMER PPP FEU-MCNC: 1.4 UG/ML FEU (ref 0–0.5)
DIFFERENTIAL METHOD BLD: ABNORMAL
EOSINOPHIL # BLD AUTO: 0.1 10E9/L (ref 0–0.7)
EOSINOPHIL NFR BLD AUTO: 1.2 %
ERYTHROCYTE [DISTWIDTH] IN BLOOD BY AUTOMATED COUNT: 14.1 % (ref 10–15)
FLUAV RNA RESP QL NAA+PROBE: NEGATIVE
FLUBV RNA RESP QL NAA+PROBE: NEGATIVE
GFR SERPL CREATININE-BSD FRML MDRD: 76 ML/MIN/{1.73_M2}
GLUCOSE BLDC GLUCOMTR-MCNC: 133 MG/DL (ref 70–99)
GLUCOSE BLDC GLUCOMTR-MCNC: 158 MG/DL (ref 70–99)
GLUCOSE SERPL-MCNC: 202 MG/DL (ref 70–99)
HBA1C MFR BLD: 6.5 % (ref 0–5.6)
HCO3 BLDV-SCNC: 32 MMOL/L (ref 21–28)
HCO3 BLDV-SCNC: 34 MMOL/L (ref 21–28)
HCT VFR BLD AUTO: 39.1 % (ref 40–53)
HGB BLD-MCNC: 12.6 G/DL (ref 13.3–17.7)
IMM GRANULOCYTES # BLD: 0.1 10E9/L (ref 0–0.4)
IMM GRANULOCYTES NFR BLD: 1 %
INR PPP: 1.07 (ref 0.86–1.14)
LABORATORY COMMENT REPORT: NORMAL
LACTATE BLD-SCNC: 1.4 MMOL/L (ref 0.7–2)
LYMPHOCYTES # BLD AUTO: 1.5 10E9/L (ref 0.8–5.3)
LYMPHOCYTES NFR BLD AUTO: 15.1 %
MAGNESIUM SERPL-MCNC: 1.9 MG/DL (ref 1.6–2.3)
MCH RBC QN AUTO: 31.2 PG (ref 26.5–33)
MCHC RBC AUTO-ENTMCNC: 32.2 G/DL (ref 31.5–36.5)
MCV RBC AUTO: 97 FL (ref 78–100)
MONOCYTES # BLD AUTO: 0.9 10E9/L (ref 0–1.3)
MONOCYTES NFR BLD AUTO: 8.4 %
NEUTROPHILS # BLD AUTO: 7.5 10E9/L (ref 1.6–8.3)
NEUTROPHILS NFR BLD AUTO: 73.3 %
NRBC # BLD AUTO: 0 10*3/UL
NRBC BLD AUTO-RTO: 0 /100
NT-PROBNP SERPL-MCNC: 5472 PG/ML (ref 0–1800)
O2/TOTAL GAS SETTING VFR VENT: ABNORMAL %
O2/TOTAL GAS SETTING VFR VENT: ABNORMAL %
PCO2 BLDV: 58 MM HG (ref 40–50)
PCO2 BLDV: 58 MM HG (ref 40–50)
PH BLDV: 7.35 PH (ref 7.32–7.43)
PH BLDV: 7.38 PH (ref 7.32–7.43)
PLATELET # BLD AUTO: 305 10E9/L (ref 150–450)
PO2 BLDV: 16 MM HG (ref 25–47)
PO2 BLDV: 20 MM HG (ref 25–47)
POTASSIUM SERPL-SCNC: 5 MMOL/L (ref 3.4–5.3)
PROT SERPL-MCNC: 6.7 G/DL (ref 6.8–8.8)
RBC # BLD AUTO: 4.04 10E12/L (ref 4.4–5.9)
RSV RNA SPEC QL NAA+PROBE: NORMAL
SARS-COV-2 RNA RESP QL NAA+PROBE: NEGATIVE
SODIUM SERPL-SCNC: 138 MMOL/L (ref 133–144)
SPECIMEN SOURCE: NORMAL
TROPONIN I SERPL-MCNC: 0.04 UG/L (ref 0–0.04)
TROPONIN I SERPL-MCNC: 0.05 UG/L (ref 0–0.04)
TROPONIN I SERPL-MCNC: 0.05 UG/L (ref 0–0.04)
WBC # BLD AUTO: 10.2 10E9/L (ref 4–11)

## 2021-02-15 PROCEDURE — 999N001017 HC STATISTIC GLUCOSE BY METER IP

## 2021-02-15 PROCEDURE — 250N000009 HC RX 250: Performed by: EMERGENCY MEDICINE

## 2021-02-15 PROCEDURE — 93005 ELECTROCARDIOGRAM TRACING: CPT | Performed by: EMERGENCY MEDICINE

## 2021-02-15 PROCEDURE — 71275 CT ANGIOGRAPHY CHEST: CPT

## 2021-02-15 PROCEDURE — 93970 EXTREMITY STUDY: CPT

## 2021-02-15 PROCEDURE — 83036 HEMOGLOBIN GLYCOSYLATED A1C: CPT | Performed by: PHYSICIAN ASSISTANT

## 2021-02-15 PROCEDURE — 250N000011 HC RX IP 250 OP 636: Performed by: STUDENT IN AN ORGANIZED HEALTH CARE EDUCATION/TRAINING PROGRAM

## 2021-02-15 PROCEDURE — 84484 ASSAY OF TROPONIN QUANT: CPT | Mod: 91 | Performed by: STUDENT IN AN ORGANIZED HEALTH CARE EDUCATION/TRAINING PROGRAM

## 2021-02-15 PROCEDURE — 84484 ASSAY OF TROPONIN QUANT: CPT | Performed by: PHYSICIAN ASSISTANT

## 2021-02-15 PROCEDURE — 250N000013 HC RX MED GY IP 250 OP 250 PS 637: Performed by: STUDENT IN AN ORGANIZED HEALTH CARE EDUCATION/TRAINING PROGRAM

## 2021-02-15 PROCEDURE — 87636 SARSCOV2 & INF A&B AMP PRB: CPT | Performed by: STUDENT IN AN ORGANIZED HEALTH CARE EDUCATION/TRAINING PROGRAM

## 2021-02-15 PROCEDURE — 96374 THER/PROPH/DIAG INJ IV PUSH: CPT | Mod: 59 | Performed by: EMERGENCY MEDICINE

## 2021-02-15 PROCEDURE — 80053 COMPREHEN METABOLIC PANEL: CPT | Performed by: STUDENT IN AN ORGANIZED HEALTH CARE EDUCATION/TRAINING PROGRAM

## 2021-02-15 PROCEDURE — 85379 FIBRIN DEGRADATION QUANT: CPT | Performed by: STUDENT IN AN ORGANIZED HEALTH CARE EDUCATION/TRAINING PROGRAM

## 2021-02-15 PROCEDURE — 82803 BLOOD GASES ANY COMBINATION: CPT | Performed by: STUDENT IN AN ORGANIZED HEALTH CARE EDUCATION/TRAINING PROGRAM

## 2021-02-15 PROCEDURE — 99223 1ST HOSP IP/OBS HIGH 75: CPT | Mod: AI | Performed by: PHYSICIAN ASSISTANT

## 2021-02-15 PROCEDURE — 99291 CRITICAL CARE FIRST HOUR: CPT | Mod: 25 | Performed by: EMERGENCY MEDICINE

## 2021-02-15 PROCEDURE — 85610 PROTHROMBIN TIME: CPT | Performed by: STUDENT IN AN ORGANIZED HEALTH CARE EDUCATION/TRAINING PROGRAM

## 2021-02-15 PROCEDURE — 250N000011 HC RX IP 250 OP 636: Performed by: PHYSICIAN ASSISTANT

## 2021-02-15 PROCEDURE — 83735 ASSAY OF MAGNESIUM: CPT | Performed by: STUDENT IN AN ORGANIZED HEALTH CARE EDUCATION/TRAINING PROGRAM

## 2021-02-15 PROCEDURE — C9803 HOPD COVID-19 SPEC COLLECT: HCPCS | Performed by: EMERGENCY MEDICINE

## 2021-02-15 PROCEDURE — 83880 ASSAY OF NATRIURETIC PEPTIDE: CPT | Performed by: STUDENT IN AN ORGANIZED HEALTH CARE EDUCATION/TRAINING PROGRAM

## 2021-02-15 PROCEDURE — 85025 COMPLETE CBC W/AUTO DIFF WBC: CPT | Performed by: STUDENT IN AN ORGANIZED HEALTH CARE EDUCATION/TRAINING PROGRAM

## 2021-02-15 PROCEDURE — 999N000157 HC STATISTIC RCP TIME EA 10 MIN

## 2021-02-15 PROCEDURE — 82803 BLOOD GASES ANY COMBINATION: CPT | Performed by: EMERGENCY MEDICINE

## 2021-02-15 PROCEDURE — 250N000011 HC RX IP 250 OP 636: Performed by: EMERGENCY MEDICINE

## 2021-02-15 PROCEDURE — 200N000001 HC R&B ICU

## 2021-02-15 PROCEDURE — 93005 ELECTROCARDIOGRAM TRACING: CPT | Mod: 76 | Performed by: EMERGENCY MEDICINE

## 2021-02-15 PROCEDURE — 99285 EMERGENCY DEPT VISIT HI MDM: CPT | Performed by: EMERGENCY MEDICINE

## 2021-02-15 PROCEDURE — 5A09357 ASSISTANCE WITH RESPIRATORY VENTILATION, LESS THAN 24 CONSECUTIVE HOURS, CONTINUOUS POSITIVE AIRWAY PRESSURE: ICD-10-PCS | Performed by: EMERGENCY MEDICINE

## 2021-02-15 PROCEDURE — 94660 CPAP INITIATION&MGMT: CPT

## 2021-02-15 PROCEDURE — 36415 COLL VENOUS BLD VENIPUNCTURE: CPT | Performed by: PHYSICIAN ASSISTANT

## 2021-02-15 PROCEDURE — 83605 ASSAY OF LACTIC ACID: CPT | Performed by: STUDENT IN AN ORGANIZED HEALTH CARE EDUCATION/TRAINING PROGRAM

## 2021-02-15 RX ORDER — TRAMADOL HYDROCHLORIDE 50 MG/1
TABLET ORAL
Status: ON HOLD | COMMUNITY
Start: 2020-07-16 | End: 2021-02-17

## 2021-02-15 RX ORDER — POLYETHYLENE GLYCOL 3350 17 G/17G
17 POWDER, FOR SOLUTION ORAL DAILY PRN
Status: DISCONTINUED | OUTPATIENT
Start: 2021-02-15 | End: 2021-02-18 | Stop reason: HOSPADM

## 2021-02-15 RX ORDER — SIMVASTATIN 40 MG
40 TABLET ORAL AT BEDTIME
Status: ON HOLD | COMMUNITY
Start: 2021-02-11 | End: 2021-02-16

## 2021-02-15 RX ORDER — FUROSEMIDE 10 MG/ML
40 INJECTION INTRAMUSCULAR; INTRAVENOUS EVERY 8 HOURS
Status: DISCONTINUED | OUTPATIENT
Start: 2021-02-15 | End: 2021-02-16

## 2021-02-15 RX ORDER — HUMAN INSULIN 100 [IU]/ML
28 INJECTION, SUSPENSION SUBCUTANEOUS
COMMUNITY
Start: 2021-01-04 | End: 2022-01-01

## 2021-02-15 RX ORDER — TAMSULOSIN HYDROCHLORIDE 0.4 MG/1
0.4 CAPSULE ORAL DAILY
COMMUNITY
Start: 2020-12-15

## 2021-02-15 RX ORDER — NITROGLYCERIN 0.4 MG/1
0.4 TABLET SUBLINGUAL EVERY 5 MIN PRN
Status: DISCONTINUED | OUTPATIENT
Start: 2021-02-15 | End: 2021-02-18 | Stop reason: HOSPADM

## 2021-02-15 RX ORDER — ACETAMINOPHEN 500 MG
1000 TABLET ORAL 3 TIMES DAILY
COMMUNITY

## 2021-02-15 RX ORDER — ONDANSETRON 2 MG/ML
4 INJECTION INTRAMUSCULAR; INTRAVENOUS EVERY 6 HOURS PRN
Status: DISCONTINUED | OUTPATIENT
Start: 2021-02-15 | End: 2021-02-18 | Stop reason: HOSPADM

## 2021-02-15 RX ORDER — LOSARTAN POTASSIUM 50 MG/1
50 TABLET ORAL DAILY
Status: ON HOLD | COMMUNITY
Start: 2020-05-05 | End: 2021-02-18

## 2021-02-15 RX ORDER — HUMAN INSULIN 100 [IU]/ML
INJECTION, SUSPENSION SUBCUTANEOUS
Status: ON HOLD | COMMUNITY
Start: 2020-12-15 | End: 2021-02-17

## 2021-02-15 RX ORDER — CLOPIDOGREL BISULFATE 75 MG/1
75 TABLET ORAL DAILY
COMMUNITY
Start: 2021-02-09

## 2021-02-15 RX ORDER — DEXTROSE MONOHYDRATE 25 G/50ML
25-50 INJECTION, SOLUTION INTRAVENOUS
Status: DISCONTINUED | OUTPATIENT
Start: 2021-02-15 | End: 2021-02-18

## 2021-02-15 RX ORDER — NICOTINE POLACRILEX 4 MG
15-30 LOZENGE BUCCAL
Status: DISCONTINUED | OUTPATIENT
Start: 2021-02-15 | End: 2021-02-18 | Stop reason: HOSPADM

## 2021-02-15 RX ORDER — DEXTROSE MONOHYDRATE 25 G/50ML
25-50 INJECTION, SOLUTION INTRAVENOUS
Status: DISCONTINUED | OUTPATIENT
Start: 2021-02-15 | End: 2021-02-18 | Stop reason: HOSPADM

## 2021-02-15 RX ORDER — NICOTINE POLACRILEX 4 MG
15-30 LOZENGE BUCCAL
Status: DISCONTINUED | OUTPATIENT
Start: 2021-02-15 | End: 2021-02-18

## 2021-02-15 RX ORDER — METOPROLOL TARTRATE 25 MG/1
25 TABLET, FILM COATED ORAL 2 TIMES DAILY
Status: ON HOLD | COMMUNITY
End: 2021-02-18

## 2021-02-15 RX ORDER — FUROSEMIDE 10 MG/ML
40 INJECTION INTRAMUSCULAR; INTRAVENOUS ONCE
Status: COMPLETED | OUTPATIENT
Start: 2021-02-15 | End: 2021-02-15

## 2021-02-15 RX ORDER — ONDANSETRON 4 MG/1
4 TABLET, ORALLY DISINTEGRATING ORAL EVERY 6 HOURS PRN
Status: DISCONTINUED | OUTPATIENT
Start: 2021-02-15 | End: 2021-02-18 | Stop reason: HOSPADM

## 2021-02-15 RX ORDER — ERGOCALCIFEROL 1.25 MG/1
50000 CAPSULE ORAL WEEKLY
COMMUNITY
Start: 2020-12-16 | End: 2021-03-04

## 2021-02-15 RX ORDER — IOPAMIDOL 755 MG/ML
83 INJECTION, SOLUTION INTRAVASCULAR ONCE
Status: COMPLETED | OUTPATIENT
Start: 2021-02-15 | End: 2021-02-15

## 2021-02-15 RX ORDER — HUMAN INSULIN 100 [IU]/ML
INJECTION, SOLUTION SUBCUTANEOUS
COMMUNITY
Start: 2020-07-10 | End: 2022-01-01

## 2021-02-15 RX ADMIN — IOPAMIDOL 83 ML: 755 INJECTION, SOLUTION INTRAVENOUS at 15:10

## 2021-02-15 RX ADMIN — FUROSEMIDE 40 MG: 10 INJECTION, SOLUTION INTRAMUSCULAR; INTRAVENOUS at 19:36

## 2021-02-15 RX ADMIN — SODIUM CHLORIDE 100 ML: 9 INJECTION, SOLUTION INTRAVENOUS at 15:10

## 2021-02-15 RX ADMIN — NITROGLYCERIN 0.4 MG: 0.4 TABLET SUBLINGUAL at 14:09

## 2021-02-15 RX ADMIN — FUROSEMIDE 40 MG: 10 INJECTION, SOLUTION INTRAMUSCULAR; INTRAVENOUS at 14:10

## 2021-02-15 ASSESSMENT — ACTIVITIES OF DAILY LIVING (ADL)
WALKING_OR_CLIMBING_STAIRS: AMBULATION DIFFICULTY, REQUIRES EQUIPMENT;TRANSFERRING DIFFICULTY, REQUIRES EQUIPMENT
WALKING_OR_CLIMBING_STAIRS_DIFFICULTY: YES
CONCENTRATING,_REMEMBERING_OR_MAKING_DECISIONS_DIFFICULTY: NO
ADLS_ACUITY_SCORE: 17
WEAR_GLASSES_OR_BLIND: NO
DRESSING/BATHING_DIFFICULTY: NO
DOING_ERRANDS_INDEPENDENTLY_DIFFICULTY: NO
TOILETING_ASSISTANCE: TOILETING DIFFICULTY, ASSISTANCE 1 PERSON
WHICH_OF_THE_ABOVE_FUNCTIONAL_RISKS_HAD_A_RECENT_ONSET_OR_CHANGE?: AMBULATION
FALL_HISTORY_WITHIN_LAST_SIX_MONTHS: NO
CONCENTRATING,_REMEMBERING_OR_MAKING_DECISIONS_DIFFICULTY: NO
DIFFICULTY_COMMUNICATING: NO
HEARING_DIFFICULTY_OR_DEAF: NO
EQUIPMENT_CURRENTLY_USED_AT_HOME: CANE, STRAIGHT;WALKER, ROLLING
DIFFICULTY_EATING/SWALLOWING: NO
TOILETING_ISSUES: YES

## 2021-02-15 ASSESSMENT — MIFFLIN-ST. JEOR: SCORE: 1520.63

## 2021-02-15 NOTE — ED NOTES
Bed: ED19  Expected date: 2/15/21  Expected time: 3:51 PM  Means of arrival:   Comments:  Hold for 25- BiPap

## 2021-02-15 NOTE — ED PROVIDER NOTES
History   No chief complaint on file.    HPI  Jhoan Curran is a 88 year old male with past medical history of femoral artery pseudoaneurysm,  neurogenic claudication, T2DM, SILVANO, glaucoma, GERD, hypertension, hyperlipidemia, peripheral vascular disease, aortic stenosis, coronary artery disease who presents with shortness of breath.    Patient was admitted to outside hospital 2/5 after reporting right foot swelling and redness.  He had cellulitis in setting of critical limb ischemia, underwent angiogram and had an arthrectomy with drug-coated balloon angioplasty of the right superficial femoral artery and popliteal arteries.  Unfortunately he suffered a complication of progressively gangrenous right great toe, will likely need amputation.  Currently they are awaiting to see where the dead tissue well stop.  He was given Plavix and Augmentin to take outpatient.    Today, patient reports progressive bilateral lower extremity swelling, right greater than left.  In addition he has pleuritic chest pain, shortness of breath, and severe dyspnea on exertion.  The pain in his chest is sharp, transient, and midsternal.  He sleeps in a chair and couch, which is usual for him.  He denies any paroxysmal nocturnal dyspnea.  He has been taking his medications as prescribed.  In addition he has had a nonproductive cough without hemoptysis, subjective fever and chills.  No loss of taste or smell, does have a mild sore throat that is nonradiating.  No myalgias, no headache, near syncope/syncope, back pain, jaw pain, extremity pain, focal deficits, or myalgias/arthralgias.     Allergies:  Allergies   Allergen Reactions     Atorvastatin Other (See Comments)     Body aches     Hmg-Coa-R Inhibitors      cough     Iodine      nausea     Lisinopril Other (See Comments)     Cough         Problem List:    Patient Active Problem List    Diagnosis Date Noted     Foreskin does not retract 02/11/2015     Priority: Medium        Past Medical  History:    No past medical history on file.    Past Surgical History:    Past Surgical History:   Procedure Laterality Date     CIRCUMCISION N/A 3/19/2015    Procedure: CIRCUMCISION;  Surgeon: Bhargav Price MD;  Location: [facility] OR     GENITOURINARY SURGERY         Family History:    No family history on file.    Social History:  Marital Status:   [5]  Social History     Tobacco Use     Smoking status: Never Smoker     Smokeless tobacco: Never Used   Substance Use Topics     Alcohol use: No     Drug use: No        Medications:         aspirin (BABY ASPIRIN) 81 MG chewable tablet       atenolol (TENORMIN) 25 MG tablet       fish oil-omega-3 fatty acids (FISH OIL) 1000 MG capsule       insulin aspart (NovoLOG) injection        niacin 25 MG half-tablet       OMEPRAZOLE PO       Simvastatin (ZOCOR PO)       VALSARTAN PO          Review of Systems   A 10 point review of systems was conducted is otherwise negative unless stated in the HPI.        Physical Exam          Physical Exam  Exam:  Constitutional: healthy, alert and mild distress  Head: Normocephalic. No masses, lesions, tenderness or abnormalities  Neck: Neck supple. No adenopathy. Thyroid symmetric, normal size,, Carotids without bruits.  ENT: ENT exam normal, no neck nodes or sinus tenderness  Cardiovascular: negative, PMI normal. No lifts, heaves, or thrills. RRR. No murmurs, clicks gallops or rub  Respiratory:   - bilateral rhonchi and mild wheezing with good air movement bilaterally  - no crackles  Gastrointestinal: Abdomen soft, non-tender. BS normal. No masses, organomegaly  : Deferred  Musculoskeletal:   - Right great toe necrosis  - faint erythema of the dorsum of the right foot, not hot to touch, non crepitus, no fluctunace, no dehiscence  - perfusing adequately 3-5 seconds   Skin: no suspicious lesions or rashes  Neurologic: Gait normal. Reflexes normal and symmetric. Sensation grossly WNL.  Psychiatric: mentation appears normal  and affect normal/bright  Hematologic/Lymphatic/Immunologic: Normal cervical lymph nodes    ED Course     ED Course as of Feb 15 1648   Mon Feb 15, 2021   1333 EKG #1 and #2 significant artifact, no obvious or significant findings of ischemia appreciated.       1348 BSUS Cardiac reveals mild decerase LV function, Mild RV dilation, IVC 2.2 cm very plump and without variation with respirations, bilateral b-lines consistent with pulmonary edema, bilateral moderate sized pleural effusions.       1353 1/6/2021 OSH Echocardiogram    Final Conclusion Previous Study: 3/9/2017   1.  Normal left ventricular chamber size.  Estimated left ventricular ejection fraction is   55%.   2.  Normal right ventricular chamber size.  Normal right ventricular systolic function.   3.  Mild-moderate calcific aortic valve stenosis.  Aortic valve systolic mean gradient is 11.6   mmHg.  Aortic valve dimensionless index is   0.31.   4.  Mild mitral valve regurgitation.   5.  When compared to the previous echocardiographic report of 3/9/2017, the aortic stenosis   has progessed slightly.     Estimated EF: 55%      1403 Hemoglobin(!): 12.6   1403 WBC: 10.2   1409 INR: 1.07   1409 Lactic Acid: 1.4   1413 D-Dimer(!): 1.4   1440 N-Terminal Pro BNP Inpatient(!): 5,472   1441 Magnesium: 1.9   1441 Troponin I ES(!): 0.047   1441 Glucose(!): 202   1441 Carbon Dioxide(!): 33   1441 Sodium: 138   1441 Potassium: 5.0   1441 Albumin(!): 2.7   1441 Protein Total(!): 6.7   1535 US lower extremity    IMPRESSION: No evidence of deep venous thrombosis.      1638 Troponin I ES(!): 0.051     Procedures          EKG appears to have significant artifact         No results found for this or any previous visit (from the past 24 hour(s)).    Medications - No data to display    Assessments & Plan (with Medical Decision Making)     Patient is an 88-year-old male with past medical history of aortic valve stenosis, hypertension, CAD status post three-vessel CABG in 2018,  left lower extremity bypass, right lower extremity arthrectomy with balloon angioplasty, right great toe gangrene who presents with shortness of breath.  Recent admission to outside hospital on 2/4/2021 found to have a lower extremity angiogram which showed significant occlusion requiring arthrectomy and balloon angioplasty of the right SFA and popliteal arteries complicated by right toe necrosis.  He was put on Plavix and Augmentin for follow-up, currently awaiting the extent of the necrosis prior to amputation.  Today, he presents for shortness of breath since his discharge on 2/10 and bilateral lower extremity swelling.  Shortness of breath associated with pleuritic anterior chest pain.  Dyspnea when laying down, and significant dyspnea on exertion.  However when he does not take a deep breath he has no significant chest pain.  He has been compliant with his medications.    GCS 15.  Blood pressure 161/83, temperature 96.8  F, pulse of 69-73, respiratory rate of 25-33, saturating initially 93 to 99% on room air.  However he had multiple desaturations in the 80s, and was put on 2 L nasal cannula with improvement to greater than 95%.  I done a bedside ultrasound which had revealed slightly decreased LV function, mild RV dilation, significant bilateral B-lines up into the apices, and moderately sized bilateral pleural effusions (with about 3 to 4 cm of space between the lung and diaphragm).  His IVC was 2.2 cm and not variable with spontaneous respirations (on room air).  He had bilateral rhonchi, but adequate air movement in the bilateral lung fields.  Mild abdominal distention, but no significant tenderness.  Otherwise benign abdomen.he had 1+ edema in the bilateral lower extremities, but no calf tenderness upon palpation.  His right great toe did appear necrotic, and there was some very minimal erythema of the dorsum of his right foot.  Was not hot to touch, this is not consistent with cellulitis.  Certainly his  Augmentin makes this less likely as well.  His EKG had significant artifact, however when compared to previous likely unchanged.  Sodium 138, potassium 5.4, glucose 202, creatinine 0.9.  INR 1.07, troponin elevated 0.047.  Likely elevated troponin secondary to demand ischemia versus acute myocardial injury in the setting of acute decompensated heart failure.  BNP elevated 5472.  Covid test pending.  CT pulmonary angiogram revealed bilateral pulmonary edema without signs of pulmonary emboli.  Bilateral lower extremity ultrasound revealed no signs of DVT.  Patient continued to have dyspnea and emergency department, was placed on BiPAP and given 1 nitroglycerin glycerin sublingual.  Ultimately patient will need further diuresis, cardiac monitoring, troponin follow up, non-invasive positive pressure ventilation and was admitted to the hospitalist in stable condition.    I have reviewed the nursing notes.    I have reviewed the findings, diagnosis, plan and need for follow up with the patient.    New Prescriptions    No medications on file     Impression:  Coronary artery disease s/p 3v-CABG  Peripheral vascular disease s/p LLE bypass and RLE atherectomy w/ balloon angioplasty  Aortic stenosis  T2DM  SILVANO  GERD  Hypertension  Hyperlipidemia  Acute decompensated heart failure  Acute hypoxic hypercarbic respiratory failure  Elevated troponin, likely secondary to demand vs acute myocardial injury  Bilateral pulmonary edema  Bilateral pleural effusions, moderate  Lower extremity swelling  Right great toe necrosis, subacute      Manjeet Triplett MD  [id number], Emergency Medicine Resident  2/15/2021   [facility] EMERGENCY DEPT     Ioana, Nelson RYAN MD  Resident  02/15/21 [id number]

## 2021-02-15 NOTE — H&P
[facility]    History and Physical  Hospital Medicine       Date of Admission:  2/15/2021  Date of Service: 2/15/2021     Assessment & Plan   Jhoan Curran is a 88 year old male who presents on 2/15/2021 with shortness of breath.     Acute respiratory failure with hypoxia and hypercapnia  Pulmonary edema  Elevated brain natriuretic peptide (BNP) level  Patient has had ongoing shortness of breath since prior hospitalization admitted hospital.  He tells me that he was getting IV Lasix while in the hospital though was not discharged on anything.  He admits to shortness of breath with activity, shortness of breath was worse last night when he was trying to lay down to sleep.  He has a mild cough.  He admits to bilateral lower extremity edema.  He is not sure if he has gained any weight.  Differential is broad, suspect this is an acute decompensated heart failure given elevated BNP of 5472, large bilateral pleural effusions and bibasilar atelectasis, consolidation and pulmonary edema; he reports his symptoms have improved after receiving IV Lasix 40 mg x 2.  Covid and influenza negative.  No evidence for PE on CT imaging.  Pneumonia is of consideration though with no white count, no fever this seems less likely.  Echocardiogram from 1/6/21 revealed EF of 55%. Patient was started on BiPAP in the emergency department and given 40 mg Lasix IV.   - Admit to intensive care unit, continue BiPAP.     - Continue Lasix 40 mg every 8 hours.    - Strict intake and output.   - Daily weights.   - Consider repeat echocardiogram in the morning.  This has not been ordered.    Elevated troponin  Initial troponin 0 0.047--0.051--0.044.  Suspect troponin leak secondary to demand ischemia.  Will not trend further.    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  Patient was recent hospitalized at [facility].  He is following with podiatry.  Last seen in 2-15-21.  Eventually will need right great toe amputation.   - Continue outpatient follow-up.   - Continue prior to admission Plavix.   - Continue prior to admission Augmentin.      HTN (hypertension)   - Continue to monitor blood pressure.   - Continue home medications with holding parameters.      Overflow incontinence   - Continue prior to admission Flomax.      Hyperlipidemia LDL goal <100   - Continue prior to admission simvastatin.    Diabetes mellitus, type 2   Patient managed prior to admission with 25 units of insulin NPH in the morning and 20 units in the evening and sliding scale.  Hemoglobin A1c 2/15, 6.5.    - Continue prior to admission insulin NPH.   - Insulin sliding scale.   - Hypo-/hyperglycemia protocol.   - Continue to monitor blood sugar.   - Moderate CHO diet.    CAD (coronary artery disease)  Patient has history of three-vessel CABG at [facility] in October 2008.    COVID-19 ruled out  Patient was tested for hospital admission.  Negative.   -No precautions.           Diet:  Moderate CHO   DVT Prophylaxis: plavix, pcds.   M[place]enhall Catheter: not present  Code Status:   Full       Disposition Plan   Expected discharge: 2 - 3 days, recommended to prior living arrangement once adequate pain management/ tolerating PO medications, hemoglobin stable, O2 use less than at baseline liters/minute and safe disposition plan/ TCU bed available.  Entered: Francy Regan PA-C 02/15/2021, 4:52 PM       Status: Patient is appropriate for admission to inpatient service for further evaluation and treatment.    Francy Regan PA-C        The patient's care was discussed with the Attending Physician, Dr. Aziza Vegas, ED provider, Dr. Triplett, Resident , and the patient.     Primary Care Physician   Sukumar Escobar [phone]    History is obtained from the patient, Jhoan Curran and review of old records via the EMR.    History of Present Illness    Jhoan Curran is a 88 year old male with past medical history of hypertension, PVD, Dm type II, GERD, CAD (s/p 3 vessel CABG 2008),SILVANO who now presents on 2/15/2021 with shortness of breath.     Patient reports worsening shortness of breath since his prior hospitalization.  He tells me that he was given IV diuresis while he was hospitalized.  But was not discharged on anything.  It does appear that he was receiving Lasix.  He admits to bilateral lower extremity edema.  He tells me that shortness of breath is worse on exertion and if he tries to lay flat.  He reports that it became much worse yesterday evening,  when he was trying to sleep.  He reports he has not been monitoring his weight. He has a mild cough.  He does not use oxygen or CPAP at baseline.  He reports that since arrival to the emergency department he has already improved.    Was hypoxic to 88% on room air on arrival and was placed on BIPAP.     Patient lives at home with family.    Patient was hospitalized at [facility] from 2-4-21 to 2-10-21.  He was hospitalized at that time for worsening right foot swelling and erythema.  He was treated for cellulitis in the setting of critical limb ischemia.  He underwent angiogram on 2-5 and had atherectomy with drug-coated balloon angioplasty of the right SFA and popliteal arteries.  His right great toe became progressively gangrenous during his hospitalization.  Patient's toe will need to be amputated he has been following with podiatry.  He had a follow-up on 2-15-21 and they are waiting to see where the gangrene will stop so they know how far to amputate.  He was discharged on Augmentin and started on Plavix.    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 3 vessel CABG @ [facility] Oct 2008. 60% LVEF.     Diabetes mellitus, type 2 (H)      Gangrene of toe of right foot (H)      HTN (hypertension)      Hyperlipidemia LDL goal <100      SILVANO (obstructive sleep apnea)      Overflow incontinence      PAD (peripheral artery disease) (H)      RBBB      Patient Active Problem List    Diagnosis Date Noted     Elevated troponin 02/15/2021     Priority: Medium     SOB (shortness of breath) 02/15/2021     Priority: Medium     Elevated brain natriuretic peptide (BNP) level 02/15/2021     Priority: Medium     HTN (hypertension) 02/15/2021     Priority: Medium     CAD (coronary artery disease) 02/15/2021     Priority: Medium     Acute decompensated heart failure (H) 02/15/2021     Priority: Medium     Bilateral pleural effusion 02/15/2021     Priority: Medium     COVID-19 ruled out 02/15/2021     Priority: Medium     Critical lower limb ischemia 02/15/2021     Priority: Medium     Acute respiratory failure with hypoxia and hypercapnia (H) 02/15/2021     Priority: Medium     Pulmonary edema 02/15/2021     Priority: Medium     Overflow incontinence      Priority: Medium     Hyperlipidemia LDL goal <100      Priority: Medium     Diabetes mellitus, type 2 (H)      Priority: Medium     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 Taking?   NOVOLIN N RELION 100 UNIT/ML susp 2/14/2021 at Unknown time  Yes Yes   Sig: INJECT 25 UNITS SUBCUTANEOUSLY IN THE MORNING AND 20 UNITS IN THE EVENING   NOVOLIN R RELION 100 UNIT/ML soln 2/14/2021 at Unknown time  Yes Yes   Sig: INJECT 3 UNITS SUBCUTANEOUSLY TWICE DAILY BEFORE MEAL(S)   Simvastatin (ZOCOR PO) 2/14/2021 at Unknown time  Yes Yes   Sig: Take 40 mg by mouth   VALSARTAN PO 2/14/2021 at Unknown time  Yes Yes   Sig: Take 320 mg by mouth   acetaminophen (TYLENOL) 500 MG tablet   Yes No   Sig: Take 500 mg by mouth 3 times daily as needed   amoxicillin-clavulanate (AUGMENTIN) 875-125 MG tablet 2/15/2021 at Unknown time  Yes Yes   Sig: Take 1 tablet by mouth 2 times daily x7 days   atenolol (TENORMIN) 25 MG tablet 2/15/2021 at Unknown time  Yes Yes   Sig: Take 25 mg by mouth daily   clopidogrel (PLAVIX) 75 MG tablet 2/14/2021 at Unknown time  Yes Yes   Sig: Take 75 mg by mouth   ergocalciferol (ERGOCALCIFEROL) 1.25 MG (76025 UT) capsule Past Week at Unknown time  Yes Yes   Sig: Take 50,000 Units by mouth   fish oil-omega-3 fatty acids (FISH OIL) 1000 MG capsule 2/15/2021 at Unknown time  Yes Yes   Sig: Take 2 g by mouth daily.   insulin NPH (NOVOLIN N VIAL) 100 UNIT/ML vial 2/14/2021 at Unknown time  Yes Yes   Sig: INJECT 25 UNITS SUBCUTANEOUSLY IN THE MORNING AND 20 UNITS IN THE EVENING   insulin aspart (NovoLOG) injection  2/14/2021 at Unknown time  Yes Yes   Sig: Inject  Subcutaneous See Admin Instructions. dispense flex pen   insulin regular 100 UNIT/ML vial 2/14/2021 at Unknown time  Yes Yes   Sig: Inject 3 Units Subcutaneous   losartan (COZAAR) 50 MG tablet 2/14/2021 at Unknown time   Yes Yes   Sig: Take 50 mg by mouth   metoprolol tartrate (LOPRESSOR) 25 MG tablet 2/15/2021 at Unknown time  Yes Yes   Sig: Take 50 mg by mouth   tamsulosin (FLOMAX) 0.4 MG capsule 2/14/2021 at Unknown time  Yes Yes   Sig: Take 0.4 mg by mouth   traMADol (ULTRAM) 50 MG tablet Unknown at Unknown time  Yes No      Facility-Administered Medications: None     Allergies   Allergies   Allergen Reactions     Atorvastatin Other (See Comments)     Body aches     Hmg-Coa-R Inhibitors      cough     Iodine      nausea     Lisinopril Other (See Comments)     Cough         Family History    Family History   Problem Relation Age of Onset     Nephrolithiasis Mother      Nephrolithiasis Father        Social History   Social History     Socioeconomic History     Marital status:      Spouse name: Not on file     Number of children: Not on file     Years of education: Not on file     Highest education level: Not on file   Occupational History     Not on file   Social Needs     Financial resource strain: Not on file     Food insecurity     Worry: Not on file     Inability: Not on file     Transportation needs     Medical: Not on file     Non-medical: Not on file   Tobacco Use     Smoking status: Never Smoker     Smokeless tobacco: Never Used   Substance and Sexual Activity     Alcohol use: No     Drug use: No     Sexual activity: Not Currently   Lifestyle     Physical activity     Days per week: Not on file     Minutes per session: Not on file     Stress: Not on file   Relationships     Social connections     Talks on phone: Not on file     Gets together: Not on file     Attends Christian service: Not on file     Active member of club or organization: Not on file     Attends meetings of clubs or organizations: Not on file     Relationship status: Not on file     Intimate partner violence     Fear of current or ex partner: Not on file     Emotionally abused: Not on file     Physically abused: Not on file     Forced sexual  activity: Not on file   Other Topics Concern     Parent/sibling w/ CABG, MI or angioplasty before 65F 55M? Not Asked   Social History Narrative     Not on file       Physical Exam   BP (!) 149/85   Pulse 68   Temp 98.5  F (36.9  C) (Oral)   Resp 11   Wt 90.7 kg (200 lb)   SpO2 99%   BMI 30.41 kg/m       Weight: 196 lbs 10.41 oz Body mass index is 30.8 kg/m .     Constitutional: Alert, oriented, cooperative, no apparent distress, appears nontoxic, speaking in full sentences, appears stated age.   Eyes: Eyes are clear. No scleral icterus. Extroccular movements intact.   HEENT: Oropharynx is clear and moist. Normocephalic, no evidence of cranial trauma.   Cardiovascular: Regular rhythm and rate, normal S1 and S2. No murmur appreciated. Peripheral pulses intact bilaterally. bilteral lower extremity edema.  Respiratory: Lung sounds are clear to auscultation bilaterally without wheezes, rhonchi, or crackles.  GI: obese. Bruising noted to left side from prior procedureSoft, non-distended. Non-tender, no rebound or guarding. No hepatosplenomegaly or masses appreciated. Normal bowel sounds. Negative CVA tenderness.  Musculoskeletal: right great toe is bandaged. moves all extremities approprietly. Normal muscle bulk and tone.   Skin: Warm and dry, no rashes or ecchymoses.   Neurologic: Neck supple. Patient moves all extremities. Cranial nerves are grossly intact.  is symmetric. Gross strength and sensation are equal in bilateral upper and lower extremities.   Genitourinary: normal appearing male genitalia, external mora in place, ecchymosis from prior procedure.     Data   Data reviewed today:   Recent Labs   Lab 02/15/21  1549 02/15/21  1346   WBC  --  10.2   HGB  --  12.6*   MCV  --  97   PLT  --  305   INR  --  1.07   NA  --  138   POTASSIUM  --  5.0   CHLORIDE  --  104   CO2  --  33*   BUN  --  20   CR  --  0.90   ANIONGAP  --  1*   SASHA  --  9.2   GLC  --  202*   ALBUMIN  --  2.7*   PROTTOTAL  --  6.7*    BILITOTAL  --  1.2   ALKPHOS  --  87   ALT  --  18   AST  --  15   TROPI 0.051* 0.047*       Recent Results (from the past 24 hour(s))   US Lower Extremity Venous Duplex Bilateral    Narrative    VENOUS ULTRASOUND BOTH LEGS  2/15/2021 3:00 PM     HISTORY: worsening swelling bilateral lower extremities, recent  hospital admission, pleuritic chest pain, and sob.    COMPARISON: None.    FINDINGS:  Examination of the deep veins with graded compression and  color flow Doppler with spectral wave form analysis was performed.   There is no evidence for DVT in the lower extremities. The greater  saphenous veins are not visualized due to previous harvesting.      Impression    IMPRESSION: No evidence of deep venous thrombosis.    ALEXY HILL MD   CT Chest Pulmonary Embolism w Contrast    Narrative    CT CHEST PULMONARY EMBOLISM W CONTRAST 2/15/2021 3:32 PM    CLINICAL HISTORY: PE suspected, low/intermediate prob, positive  D-dimer; heart failure sob  TECHNIQUE: CT angiogram chest during arterial phase injection IV  contrast. 2D and 3D MIP reconstructions were performed by the CT  technologist. Dose reduction techniques were used.     CONTRAST: 83 mL Isovue 370    COMPARISON: Chest x-ray 3/22/2018    FINDINGS:  ANGIOGRAM CHEST: No pulmonary emboli.    LUNGS AND PLEURA: Large bilateral pleural effusions and bibasilar  atelectasis/consolidation. Mild interlobular septal thickening and  groundglass opacities in the lung apices compatible with pulmonary  edema. Linear atelectasis in the lingula. Anterior right middle lobe 3  mm subpleural nodule (series 7, image 142).    MEDIASTINUM/AXILLAE: No lymphadenopathy. Normal heart size. CABG. No  pericardial effusion. Tiny hiatal hernia.    UPPER ABDOMEN: Cholecystectomy. Partly visualized left renal cyst  containing simple fluid where visualized. Duodenal diverticuli.  Splenule. Atherosclerotic aortic calcifications.    MUSCULOSKELETAL: Median sternotomy. Old healed left rib  fractures.  Degenerative changes in the spine. No concerning lesions in the bones.  Mild wedge compression deformity of the upper endplate of L1.      Impression    IMPRESSION:  1.  No pulmonary emboli.  2.  Large bilateral pleural effusions and bibasilar  atelectasis/consolidation.  3.  Pulmonary edema.    VERO SHARPE MD

## 2021-02-15 NOTE — ED NOTES
Pt moved to room 19, put on bipap at 14/7 40% FiO2. Pt tolerating mask well. Pt denies chest pain.

## 2021-02-15 NOTE — ED NOTES
Pt states he has been SOB for one week. Was discharged from [place] last Thursday; pt said he was SOB in the hospital but they were not concerned. No home O2. O2 sats 94-96 on RA. Pt is SOB at rest in bed. Difficult time obtaining EKG two different EKGs obtained provider aware.

## 2021-02-16 ENCOUNTER — APPOINTMENT (OUTPATIENT)
Dept: CARDIOLOGY | Facility: CLINIC | Age: 86
DRG: 291 | End: 2021-02-16
Attending: FAMILY MEDICINE
Payer: COMMERCIAL

## 2021-02-16 LAB
ANION GAP SERPL CALCULATED.3IONS-SCNC: 5 MMOL/L (ref 3–14)
BASE EXCESS BLDV CALC-SCNC: 8.9 MMOL/L
BUN SERPL-MCNC: 17 MG/DL (ref 7–30)
CALCIUM SERPL-MCNC: 9.2 MG/DL (ref 8.5–10.1)
CHLORIDE SERPL-SCNC: 101 MMOL/L (ref 94–109)
CO2 SERPL-SCNC: 33 MMOL/L (ref 20–32)
CREAT SERPL-MCNC: 0.91 MG/DL (ref 0.66–1.25)
ERYTHROCYTE [DISTWIDTH] IN BLOOD BY AUTOMATED COUNT: 14.3 % (ref 10–15)
GFR SERPL CREATININE-BSD FRML MDRD: 75 ML/MIN/{1.73_M2}
GLUCOSE BLDC GLUCOMTR-MCNC: 134 MG/DL (ref 70–99)
GLUCOSE BLDC GLUCOMTR-MCNC: 134 MG/DL (ref 70–99)
GLUCOSE BLDC GLUCOMTR-MCNC: 151 MG/DL (ref 70–99)
GLUCOSE BLDC GLUCOMTR-MCNC: 162 MG/DL (ref 70–99)
GLUCOSE BLDC GLUCOMTR-MCNC: 242 MG/DL (ref 70–99)
GLUCOSE SERPL-MCNC: 143 MG/DL (ref 70–99)
HCO3 BLDV-SCNC: 34 MMOL/L (ref 21–28)
HCT VFR BLD AUTO: 40.8 % (ref 40–53)
HGB BLD-MCNC: 12.8 G/DL (ref 13.3–17.7)
MCH RBC QN AUTO: 30.3 PG (ref 26.5–33)
MCHC RBC AUTO-ENTMCNC: 31.4 G/DL (ref 31.5–36.5)
MCV RBC AUTO: 97 FL (ref 78–100)
O2/TOTAL GAS SETTING VFR VENT: 32 %
PCO2 BLDV: 48 MM HG (ref 40–50)
PH BLDV: 7.46 PH (ref 7.32–7.43)
PLATELET # BLD AUTO: 308 10E9/L (ref 150–450)
PO2 BLDV: 51 MM HG (ref 25–47)
POTASSIUM SERPL-SCNC: 3.9 MMOL/L (ref 3.4–5.3)
RBC # BLD AUTO: 4.22 10E12/L (ref 4.4–5.9)
SODIUM SERPL-SCNC: 139 MMOL/L (ref 133–144)
WBC # BLD AUTO: 8.5 10E9/L (ref 4–11)

## 2021-02-16 PROCEDURE — 36415 COLL VENOUS BLD VENIPUNCTURE: CPT | Performed by: PHYSICIAN ASSISTANT

## 2021-02-16 PROCEDURE — 82803 BLOOD GASES ANY COMBINATION: CPT | Performed by: PHYSICIAN ASSISTANT

## 2021-02-16 PROCEDURE — 99233 SBSQ HOSP IP/OBS HIGH 50: CPT | Performed by: FAMILY MEDICINE

## 2021-02-16 PROCEDURE — 250N000011 HC RX IP 250 OP 636: Performed by: PHYSICIAN ASSISTANT

## 2021-02-16 PROCEDURE — 250N000011 HC RX IP 250 OP 636: Performed by: FAMILY MEDICINE

## 2021-02-16 PROCEDURE — 85027 COMPLETE CBC AUTOMATED: CPT | Performed by: PHYSICIAN ASSISTANT

## 2021-02-16 PROCEDURE — 255N000002 HC RX 255 OP 636: Performed by: FAMILY MEDICINE

## 2021-02-16 PROCEDURE — 93306 TTE W/DOPPLER COMPLETE: CPT | Mod: 26 | Performed by: INTERNAL MEDICINE

## 2021-02-16 PROCEDURE — 200N000001 HC R&B ICU

## 2021-02-16 PROCEDURE — 999N001017 HC STATISTIC GLUCOSE BY METER IP

## 2021-02-16 PROCEDURE — 250N000013 HC RX MED GY IP 250 OP 250 PS 637: Performed by: INTERNAL MEDICINE

## 2021-02-16 PROCEDURE — 250N000013 HC RX MED GY IP 250 OP 250 PS 637: Performed by: PHYSICIAN ASSISTANT

## 2021-02-16 PROCEDURE — 999N000208 ECHOCARDIOGRAM COMPLETE

## 2021-02-16 PROCEDURE — 250N000012 HC RX MED GY IP 250 OP 636 PS 637: Performed by: PHYSICIAN ASSISTANT

## 2021-02-16 PROCEDURE — 80048 BASIC METABOLIC PNL TOTAL CA: CPT | Performed by: PHYSICIAN ASSISTANT

## 2021-02-16 RX ORDER — CLOPIDOGREL BISULFATE 75 MG/1
75 TABLET ORAL EVERY EVENING
Status: DISCONTINUED | OUTPATIENT
Start: 2021-02-16 | End: 2021-02-16

## 2021-02-16 RX ORDER — FUROSEMIDE 10 MG/ML
40 INJECTION INTRAMUSCULAR; INTRAVENOUS EVERY 6 HOURS
Status: COMPLETED | OUTPATIENT
Start: 2021-02-16 | End: 2021-02-16

## 2021-02-16 RX ORDER — METOPROLOL TARTRATE 25 MG/1
25 TABLET, FILM COATED ORAL DAILY
Status: DISCONTINUED | OUTPATIENT
Start: 2021-02-16 | End: 2021-02-18

## 2021-02-16 RX ORDER — TAMSULOSIN HYDROCHLORIDE 0.4 MG/1
0.4 CAPSULE ORAL AT BEDTIME
Status: DISCONTINUED | OUTPATIENT
Start: 2021-02-16 | End: 2021-02-18 | Stop reason: HOSPADM

## 2021-02-16 RX ORDER — ACETAMINOPHEN 325 MG/1
650 TABLET ORAL EVERY 4 HOURS PRN
Status: DISCONTINUED | OUTPATIENT
Start: 2021-02-16 | End: 2021-02-18 | Stop reason: HOSPADM

## 2021-02-16 RX ORDER — ATENOLOL 25 MG/1
25 TABLET ORAL DAILY
Status: DISCONTINUED | OUTPATIENT
Start: 2021-02-16 | End: 2021-02-16

## 2021-02-16 RX ORDER — SIMVASTATIN 40 MG
40 TABLET ORAL AT BEDTIME
Status: DISCONTINUED | OUTPATIENT
Start: 2021-02-16 | End: 2021-02-18 | Stop reason: HOSPADM

## 2021-02-16 RX ORDER — CLOPIDOGREL BISULFATE 75 MG/1
75 TABLET ORAL EVERY EVENING
Status: DISCONTINUED | OUTPATIENT
Start: 2021-02-16 | End: 2021-02-18 | Stop reason: HOSPADM

## 2021-02-16 RX ADMIN — SIMVASTATIN 40 MG: 40 TABLET, FILM COATED ORAL at 21:54

## 2021-02-16 RX ADMIN — CLOPIDOGREL BISULFATE 75 MG: 75 TABLET ORAL at 16:50

## 2021-02-16 RX ADMIN — INSULIN ASPART 1 UNITS: 100 INJECTION, SOLUTION INTRAVENOUS; SUBCUTANEOUS at 16:50

## 2021-02-16 RX ADMIN — INSULIN HUMAN 20 UNITS: 100 INJECTION, SUSPENSION SUBCUTANEOUS at 21:55

## 2021-02-16 RX ADMIN — FUROSEMIDE 40 MG: 10 INJECTION, SOLUTION INTRAMUSCULAR; INTRAVENOUS at 02:16

## 2021-02-16 RX ADMIN — AMOXICILLIN AND CLAVULANATE POTASSIUM 1 TABLET: 875; 125 TABLET, FILM COATED ORAL at 20:46

## 2021-02-16 RX ADMIN — HUMAN ALBUMIN MICROSPHERES AND PERFLUTREN 2 ML: 10; .22 INJECTION, SOLUTION INTRAVENOUS at 11:43

## 2021-02-16 RX ADMIN — CLOPIDOGREL BISULFATE 75 MG: 75 TABLET ORAL at 01:35

## 2021-02-16 RX ADMIN — TAMSULOSIN HYDROCHLORIDE 0.4 MG: 0.4 CAPSULE ORAL at 01:35

## 2021-02-16 RX ADMIN — SIMVASTATIN 40 MG: 40 TABLET, FILM COATED ORAL at 01:35

## 2021-02-16 RX ADMIN — FUROSEMIDE 40 MG: 10 INJECTION, SOLUTION INTRAMUSCULAR; INTRAVENOUS at 08:05

## 2021-02-16 RX ADMIN — AMOXICILLIN AND CLAVULANATE POTASSIUM 1 TABLET: 875; 125 TABLET, FILM COATED ORAL at 01:34

## 2021-02-16 RX ADMIN — AMOXICILLIN AND CLAVULANATE POTASSIUM 1 TABLET: 875; 125 TABLET, FILM COATED ORAL at 08:05

## 2021-02-16 RX ADMIN — INSULIN ASPART 1 UNITS: 100 INJECTION, SOLUTION INTRAVENOUS; SUBCUTANEOUS at 11:50

## 2021-02-16 RX ADMIN — TAMSULOSIN HYDROCHLORIDE 0.4 MG: 0.4 CAPSULE ORAL at 21:54

## 2021-02-16 RX ADMIN — FUROSEMIDE 40 MG: 10 INJECTION, SOLUTION INTRAMUSCULAR; INTRAVENOUS at 15:04

## 2021-02-16 RX ADMIN — FUROSEMIDE 40 MG: 10 INJECTION, SOLUTION INTRAMUSCULAR; INTRAVENOUS at 20:45

## 2021-02-16 ASSESSMENT — ACTIVITIES OF DAILY LIVING (ADL)
ADLS_ACUITY_SCORE: 17

## 2021-02-16 ASSESSMENT — MIFFLIN-ST. JEOR: SCORE: 1527.63

## 2021-02-16 NOTE — PLAN OF CARE
Pt has had large amounts of urine output this shift.   Has tolerated being on NC 3L. Denies any chest pain or difficulty breathing   Reports feeling better.

## 2021-02-16 NOTE — PLAN OF CARE
Has had incontinence after IV lasix. Pt agreeable to primofit.     Pt removed off BiPaP to eat.  3L O2 NC applied with O2 saturations in the mid 90's. Francy BAEZ rounding and okay for pt to remain on NC if O2 sats are appriopriate.

## 2021-02-16 NOTE — PROGRESS NOTES
"[facility] ADMISSION NOTE    Patient admitted to room 4-ICU at approximately 1830 via TRANSPORT  from [employer] Patient was accompanied by E vik staff.     Verbal SBAR report received from E VIK nurse prior to patient arrival.     Patient transferred to bed with assistance. Patient alert and oriented X 4. Pain control:2-out of 10. . Admission vital signs: Blood pressure (!) 156/85, pulse 64, temperature 98  F (36.7  C), temperature source Axillary, resp. rate 17, height 1.702 m (5' 7\"), weight 89.2 kg (196 lb 10.4 oz), SpO2 100 %. PATIENT,  was oriented to plan of care, ROOM ORIENTATION::\"call light\",\"bed controls\",\"tv\",\"telephone\",\"bathroom\",\"visiting hours\"     Risk Assessment    The following safety risks were identified during admission: safety risk. Yellow risk band applied: YES\".     Skin Initial Assessment    This writer admitted this patient and completed a full skin assessment and Arpan score in the Adult PCS flowsheet. Appropriate interventions initiated as needed.     Secondary skin check completed by MERON Antony Risk Assessment  Sensory Perception: 3-->slightly limited  Moisture: 3-->occasionally moist  Activity: 3-->walks occasionally  Mobility: 2-->very limited  Nutrition: 3-->adequate  Friction and Shear: 1-->problem  Arpan Score: 15  Sensory/Activity/Mobility Interventions: Heel off-loading boot(s)  Friction/Shear Interventions: HOB 30 degrees or less  Mattress: [facility] Mattress (Foam)  Informed Refusal Interventions: No    Education    Patient has a [place] to Observation order: -NO   Observation education completed and documented: SELWYN Raya RN    "

## 2021-02-16 NOTE — PROGRESS NOTES
[facility]      Subjective:  Breathing better than upon admit  No cp  Was difficulty breathing when in clinic 2/15/21   Was scheduled to see Ned Machuca vascular surgeon today.    Review of Systems:  CONSTITUTIONAL: NEGATIVE for fever, chills, change in weight  INTEGUMENTARY/SKIN: necrotic right toe  EYES: NEGATIVE for vision changes or irritation  ENT/MOUTH: NEGATIVE for ear, mouth and throat problems  RESP: currently not sob  BREAST: NEGATIVE for masses, tenderness or discharge  CV: NEGATIVE for chest pain, palpitations or peripheral edema  GI: NEGATIVE for nausea, abdominal pain, heartburn, or change in bowel habits  : NEGATIVE for frequency, dysuria, or hematuria  MUSCULOSKELETAL:right toe necrotic  NEURO: NEGATIVE for weakness, dizziness or paresthesias  ENDOCRINE: NEGATIVE for temperature intolerance, skin/hair changes  HEME: NEGATIVE for bleeding problems  PSYCHIATRIC: NEGATIVE for changes in mood or affect    Physical Exam:  Vitals Were Reviewed    Patient Vitals for the past 16 hrs:   BP Temp Temp src Pulse Resp SpO2 Height Weight   02/16/21 0601 114/63 -- -- 64 15 94 % -- --   02/16/21 0600 114/63 -- -- 67 16 98 % -- --   02/16/21 0506 -- -- -- -- -- -- -- 89.9 kg (198 lb 3.1 oz)   02/16/21 0406 123/62 -- -- 67 16 96 % -- --   02/16/21 0400 123/62 -- -- 67 19 96 % -- --   02/16/21 0354 -- 98.4  F (36.9  C) Axillary -- -- -- -- --   02/16/21 0015 -- -- -- 69 17 96 % -- --   02/16/21 0005 -- 97.9  F (36.6  C) Oral -- -- 97 % -- --   02/16/21 0000 137/76 -- -- 71 18 -- -- --   02/15/21 2300 126/75 -- -- 60 15 95 % -- --   02/15/21 2200 125/69 -- -- 75 28 96 % -- --   02/15/21 2100 121/61 -- -- 68 20 -- -- --   02/15/21 2001 -- -- -- 74 (!) 48 98 % -- --   02/15/21 2000 (!) 132/107 -- -- 68 (!) 7 -- -- --   02/1935 -- 98.1  F (36.7  C) Axillary -- -- -- -- --   02/15/21 1900 (!) 150/80 -- -- 63 17 -- -- --   02/15/21 1824 -- 98  F (36.7  C) Axillary -- -- -- 1.702 m (5' 7\") " 89.2 kg (196 lb 10.4 oz)   02/15/21 1800 (!) 156/85 -- -- 64 17 -- -- --   02/15/21 1745 (!) 161/82 -- -- 64 14 100 % -- --   02/15/21 1730 (!) 151/92 -- -- 66 21 99 % -- --   02/15/21 1715 (!) 145/84 -- -- 64 15 99 % -- --   02/15/21 1703 -- -- -- -- -- 99 % -- --   02/15/21 1700 (!) 151/81 -- -- 64 15 100 % -- --   02/15/21 1645 (!) 160/86 -- -- 69 (!) 34 99 % -- --   02/15/21 1630 (!) 154/83 -- -- 61 19 99 % -- --   02/15/21 1615 (!) 155/71 -- -- 61 19 95 % -- --   02/15/21 1545 (!) 149/85 -- -- 68 11 -- -- --   02/15/21 1530 (!) 150/85 98.5  F (36.9  C) Oral 71 22 99 % -- --   02/15/21 1500 (!) 140/77 -- -- 66 18 100 % -- --         Intake/Output Summary (Last 24 hours) at 2/16/2021 0650  Last data filed at 2/16/2021 0508  Gross per 24 hour   Intake 120 ml   Output 4925 ml   Net -4805 ml       GENERAL APPEARANCE: mildly dyspneic at rest, nondistressed  EYES: conjunctiva clear, eyes grossly normal  RESP: a few bibasilar crackles  CV: regular rate and rhythm, normal S1 S2, no S3 or S4 and no murmur, click or rub   ABDOMEN: soft, nontender, no HSM or masses and bowel sounds normal  MS: right toe necrotic, foot warm, pitting edema bilat  SKIN: above    Lab:  Recent Labs   Lab Test 02/16/21  0457 02/15/21  1346    138   POTASSIUM 3.9 5.0   CHLORIDE 101 104   CO2 33* 33*   ANIONGAP 5 1*   * 202*   BUN 17 20   CR 0.91 0.90   SASHA 9.2 9.2     CBC RESULTS:   Recent Labs   Lab Test 02/16/21  0457 02/15/21  1346   WBC 8.5 10.2   RBC 4.22* 4.04*   HGB 12.8* 12.6*   HCT 40.8 39.1*    305       Results for orders placed or performed during the hospital encounter of 02/15/21 (from the past 24 hour(s))   Symptomatic Influenza A/B & SARS-CoV2 (COVID-19) Virus PCR Multiplex    Specimen: Nasopharyngeal   Result Value Ref Range    Flu A/B & SARS-COV-2 PCR Source Nasopharyngeal     SARS-CoV-2 PCR Result NEGATIVE     Influenza A PCR Negative NEG^Negative    Influenza B PCR Negative NEG^Negative    Respiratory  Syncytial Virus PCR (Note)     Flu A/B & SARS-CoV-2 PCR Comment (Note)    Lactic acid whole blood   Result Value Ref Range    Lactic Acid 1.4 0.7 - 2.0 mmol/L   CBC with platelets, differential   Result Value Ref Range    WBC 10.2 4.0 - 11.0 10e9/L    RBC Count 4.04 (L) 4.4 - 5.9 10e12/L    Hemoglobin 12.6 (L) 13.3 - 17.7 g/dL    Hematocrit 39.1 (L) 40.0 - 53.0 %    MCV 97 78 - 100 fl    MCH 31.2 26.5 - 33.0 pg    MCHC 32.2 31.5 - 36.5 g/dL    RDW 14.1 10.0 - 15.0 %    Platelet Count 305 150 - 450 10e9/L    Diff Method Automated Method     % Neutrophils 73.3 %    % Lymphocytes 15.1 %    % Monocytes 8.4 %    % Eosinophils 1.2 %    % Basophils 1.0 %    % Immature Granulocytes 1.0 %    Nucleated RBCs 0 0 /100    Absolute Neutrophil 7.5 1.6 - 8.3 10e9/L    Absolute Lymphocytes 1.5 0.8 - 5.3 10e9/L    Absolute Monocytes 0.9 0.0 - 1.3 10e9/L    Absolute Eosinophils 0.1 0.0 - 0.7 10e9/L    Absolute Basophils 0.1 0.0 - 0.2 10e9/L    Abs Immature Granulocytes 0.1 0 - 0.4 10e9/L    Absolute Nucleated RBC 0.0    Comprehensive metabolic panel   Result Value Ref Range    Sodium 138 133 - 144 mmol/L    Potassium 5.0 3.4 - 5.3 mmol/L    Chloride 104 94 - 109 mmol/L    Carbon Dioxide 33 (H) 20 - 32 mmol/L    Anion Gap 1 (L) 3 - 14 mmol/L    Glucose 202 (H) 70 - 99 mg/dL    Urea Nitrogen 20 7 - 30 mg/dL    Creatinine 0.90 0.66 - 1.25 mg/dL    GFR Estimate 76 >60 mL/min/[1.73_m2]    GFR Estimate If Black 88 >60 mL/min/[1.73_m2]    Calcium 9.2 8.5 - 10.1 mg/dL    Bilirubin Total 1.2 0.2 - 1.3 mg/dL    Albumin 2.7 (L) 3.4 - 5.0 g/dL    Protein Total 6.7 (L) 6.8 - 8.8 g/dL    Alkaline Phosphatase 87 40 - 150 U/L    ALT 18 0 - 70 U/L    AST 15 0 - 45 U/L   Troponin I   Result Value Ref Range    Troponin I ES 0.047 (H) 0.000 - 0.045 ug/L   D dimer quantitative   Result Value Ref Range    D Dimer 1.4 (H) 0.0 - 0.50 ug/ml FEU   INR   Result Value Ref Range    INR 1.07 0.86 - 1.14   Magnesium   Result Value Ref Range    Magnesium 1.9 1.6 -  2.3 mg/dL   Nt probnp inpatient   Result Value Ref Range    N-Terminal Pro BNP Inpatient 5,472 (H) 0 - 1,800 pg/mL   Hemoglobin A1c   Result Value Ref Range    Hemoglobin A1C 6.5 (H) 0 - 5.6 %   US Lower Extremity Venous Duplex Bilateral    Narrative    VENOUS ULTRASOUND BOTH LEGS  2/15/2021 3:00 PM     HISTORY: worsening swelling bilateral lower extremities, recent  hospital admission, pleuritic chest pain, and sob.    COMPARISON: None.    FINDINGS:  Examination of the deep veins with graded compression and  color flow Doppler with spectral wave form analysis was performed.   There is no evidence for DVT in the lower extremities. The greater  saphenous veins are not visualized due to previous harvesting.      Impression    IMPRESSION: No evidence of deep venous thrombosis.    ALEXY HILL MD   Blood gas venous   Result Value Ref Range    Ph Venous 7.35 7.32 - 7.43 pH    PCO2 Venous 58 (H) 40 - 50 mm Hg    PO2 Venous 20 (L) 25 - 47 mm Hg    Bicarbonate Venous 32 (H) 21 - 28 mmol/L    Base Excess Venous 4.8 mmol/L    FIO2 2L per nc    CT Chest Pulmonary Embolism w Contrast    Narrative    CT CHEST PULMONARY EMBOLISM W CONTRAST 2/15/2021 3:32 PM    CLINICAL HISTORY: PE suspected, low/intermediate prob, positive  D-dimer; heart failure sob  TECHNIQUE: CT angiogram chest during arterial phase injection IV  contrast. 2D and 3D MIP reconstructions were performed by the CT  technologist. Dose reduction techniques were used.     CONTRAST: 83 mL Isovue 370    COMPARISON: Chest x-ray 3/22/2018    FINDINGS:  ANGIOGRAM CHEST: No pulmonary emboli.    LUNGS AND PLEURA: Large bilateral pleural effusions and bibasilar  atelectasis/consolidation. Mild interlobular septal thickening and  groundglass opacities in the lung apices compatible with pulmonary  edema. Linear atelectasis in the lingula. Anterior right middle lobe 3  mm subpleural nodule (series 7, image 142).    MEDIASTINUM/AXILLAE: No lymphadenopathy. Normal heart size.  CABG. No  pericardial effusion. Tiny hiatal hernia.    UPPER ABDOMEN: Cholecystectomy. Partly visualized left renal cyst  containing simple fluid where visualized. Duodenal diverticuli.  Splenule. Atherosclerotic aortic calcifications.    MUSCULOSKELETAL: Median sternotomy. Old healed left rib fractures.  Degenerative changes in the spine. No concerning lesions in the bones.  Mild wedge compression deformity of the upper endplate of L1.      Impression    IMPRESSION:  1.  No pulmonary emboli.  2.  Large bilateral pleural effusions and bibasilar  atelectasis/consolidation.  3.  Pulmonary edema.    VERO SHARPE MD   Troponin I   Result Value Ref Range    Troponin I ES 0.051 (H) 0.000 - 0.045 ug/L   Blood gas venous   Result Value Ref Range    Ph Venous 7.38 7.32 - 7.43 pH    PCO2 Venous 58 (H) 40 - 50 mm Hg    PO2 Venous 16 (L) 25 - 47 mm Hg    Bicarbonate Venous 34 (H) 21 - 28 mmol/L    Base Excess Venous 7.2 mmol/L    FIO2 40% bipap    Glucose by meter   Result Value Ref Range    Glucose 133 (H) 70 - 99 mg/dL   Troponin I   Result Value Ref Range    Troponin I ES 0.044 0.000 - 0.045 ug/L   Glucose by meter   Result Value Ref Range    Glucose 158 (H) 70 - 99 mg/dL   Glucose by meter   Result Value Ref Range    Glucose 134 (H) 70 - 99 mg/dL   Blood gas venous   Result Value Ref Range    Ph Venous 7.46 (H) 7.32 - 7.43 pH    PCO2 Venous 48 40 - 50 mm Hg    PO2 Venous 51 (H) 25 - 47 mm Hg    Bicarbonate Venous 34 (H) 21 - 28 mmol/L    Base Excess Venous 8.9 mmol/L    FIO2 32    CBC with platelets   Result Value Ref Range    WBC 8.5 4.0 - 11.0 10e9/L    RBC Count 4.22 (L) 4.4 - 5.9 10e12/L    Hemoglobin 12.8 (L) 13.3 - 17.7 g/dL    Hematocrit 40.8 40.0 - 53.0 %    MCV 97 78 - 100 fl    MCH 30.3 26.5 - 33.0 pg    MCHC 31.4 (L) 31.5 - 36.5 g/dL    RDW 14.3 10.0 - 15.0 %    Platelet Count 308 150 - 450 10e9/L   Basic metabolic panel   Result Value Ref Range    Sodium 139 133 - 144 mmol/L    Potassium 3.9 3.4 - 5.3 mmol/L     Chloride 101 94 - 109 mmol/L    Carbon Dioxide 33 (H) 20 - 32 mmol/L    Anion Gap 5 3 - 14 mmol/L    Glucose 143 (H) 70 - 99 mg/dL    Urea Nitrogen 17 7 - 30 mg/dL    Creatinine 0.91 0.66 - 1.25 mg/dL    GFR Estimate 75 >60 mL/min/[1.73_m2]    GFR Estimate If Black 87 >60 mL/min/[1.73_m2]    Calcium 9.2 8.5 - 10.1 mg/dL       Assessment and Plan:    Jhoan Curran is a 88 year old male who presents on 2/15/2021 with shortness of breath.      Acute respiratory failure with hypoxia and hypercapnia  Acute diastolic congestive heart failure  Pulmonary edema/large bilat pleural effusions  Patient has had ongoing shortness of breath since prior hospitalization..  He tells me that he was getting IV Lasix while in the hospital though was not discharged on anything.  He admits to shortness of breath with activity, shortness of breath was worse last night when he was trying to lay down to sleep.  He has a mild cough.  He admits to bilateral lower extremity edema.  He is not sure if he has gained any weight.  Pt has  acute decompensated heart failure given elevated BNP of 5472, large bilateral pleural effusions and bibasilar atelectasis, consolidation and pulmonary edema; he reported his symptoms have improved after receiving IV Lasix 40 mg x 2.  Covid and influenza negative.  No evidence for PE on CT imaging.  Pneumonia unlikely with no white count, no fever this seems less likely.  Echocardiogram from 1/6/21 revealed EF of 55%.   Patient was started on BiPAP in the emergency department and given 40 mg Lasix IV.   Continue Lasix 40 mg every 8 hours.     Currently on three liters nc.Off bipap.  I/O -4805  creat 0.90 --0.91  Weight down about 4 pounds  Will repeat echo.  Has received three doses of lasix- will give lasix 40 iv q 6 times three doses.       Elevated troponin  Initial troponin 0 0.047--0.051--0.044.  Suspect troponin leak secondary to demand ischemia.  Will not trend further.     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  He had cellulitis in setting of critical limb ischemia, underwent angiogram and had an arthrectomy with drug-coated balloon angioplasty of the right superficial femoral artery and popliteal arteries.  Unfortunately he suffered a complication of progressively gangrenous right great toe, will likely need amputation.  Currently they are awaiting to see where the dead tissue well stop.  He was given Plavix and Augmentin to take outpatient.  Patient was recent hospitalized at [facility].  He is following with podiatry.  Last seen in 2-15-21.  Eventually will need right great toe amputation.   - Continue prior to admission Plavix.   - Continue prior to admission Augmentin.      HTN (hypertension)  Continue pta metoprolol  Looks like he is on once daily metoprolol -stop atenolol      Overflow incontinence  Continue prior to admission Flomax.      Hyperlipidemia LDL goal <100  Continue prior to admission simvastatin.     Diabetes mellitus, type 2   Patient managed prior to admission with 25 units of insulin NPH in the morning and 20 units in the evening and sliding scale.  Hemoglobin A1c 2/15, 6.5.   Continue prior to admission insulin NPH.  Insulin sliding scale.     CAD (coronary artery disease)  Patient has history of three-vessel CABG at [facility] in October 2008.     COVID-19 ruled out  Patient was tested for hospital admission.  Negative.   -No precautions.    Diet:  Moderate CHO   DVT Prophylaxis: plavix, pcds.   [place] Catheter: not present  Code Status:   Full      Plan-echo ordered, continue diuresis. Will notify Dr Machuca's office. Probably 2-3 more days.      7:26 AM   Tried calling son Baltazar who is listed as contact-phone won't ring, just busy type signal.    12:34 PM   Echo  Poor image quality  Left ventricular systolic  function is normal.  The visual ejection fraction is estimated at 55-60%.  The right ventricle is not well visualized.  The right ventricular systolic function is mildly reduced.  Right ventricular systolic pressure is elevated, consistent with mild to  moderate pulmonary hypertension.  Mild valvular aortic stenosis.  There is no comparison study available    4:18 PM  Baltazar [phone] cell           [phone] home    Called him  Wrong number in chart.      .

## 2021-02-16 NOTE — PLAN OF CARE
Patient assisted up in the chair with assist of one, gait belt and cane, gait steady.  Patient sat up in the chair for breakfast.  Room air oxygen sats 92-94%.  Right big toe black.

## 2021-02-16 NOTE — CONSULTS
Care Management Initial Consult    General Information  Assessment completed with: PatientJhoan  Type of CM/SW Visit: Offer D/C Planning    Primary Care Provider verified and updated as needed: Yes   Readmission within the last 30 days: current reason for admission unrelated to previous admission   Return Category: New Diagnosis     Advance Care Planning: Advance Care Planning Reviewed: no concerns identified          Communication Assessment  Patient's communication style: spoken language (English or Bilingual)    Hearing Difficulty or Deaf: no   Wear Glasses or Blind: no    Cognitive  Cognitive/Neuro/Behavioral: WDL                      Living Environment:   People in home: child(arsta), adult  Ned  Current living Arrangements: house      Able to return to prior arrangements: yes       Family/Social Support:  Care provided by: self  Provides care for: no one     Children          Description of Support System: Supportive, Involved    Support Assessment: Adequate family and caregiver support    Current Resources:   Patient receiving home care services: Yes  Skilled Home Care Services: Skilled Nursing, Home Health Aid  Community Resources: None  Equipment currently used at home: cane, quad, walker, rolling    Financial Concerns: No concerns identified           Lifestyle & Psychosocial Needs:        Socioeconomic History     Marital status:      Spouse name: Not on file     Number of children: Not on file     Years of education: Not on file     Highest education level: Not on file     Tobacco Use     Smoking status: Never Smoker     Smokeless tobacco: Never Used   Substance and Sexual Activity     Alcohol use: No     Drug use: No     Sexual activity: Not Currently         Mental Health Status:  Mental Health Status: No Current Concerns       Chemical Dependency Status:  Chemical Dependency Status: No Current Concerns            Additional Information:    Met with the patient for discharge planning.  Introduced myself and role.    The patient's son Ned lives with him in the community in a home.  The patient is independent with cares.  He is currently open with [facility] ([phone] Fax: [phone]) RN and HHA.  Discussed resuming home care.  Referral placed to resume home care.  He ambulates with a cane or walker.  His 3 children live locally and are able to assist as needed.    Patient was admitted to [facility] 2/4-2/10/21 after reporting right foot swelling and redness.  He had cellulitis, underwent an angiogram and had an arthrectomy with angioplasty of the right superficial femoral artery and popliteal arteries.     Plan:  Home with resumption of [facility].       Scarlett Desir RN

## 2021-02-17 ENCOUNTER — APPOINTMENT (OUTPATIENT)
Dept: PHYSICAL THERAPY | Facility: CLINIC | Age: 86
DRG: 291 | End: 2021-02-17
Payer: COMMERCIAL

## 2021-02-17 LAB
ANION GAP SERPL CALCULATED.3IONS-SCNC: 3 MMOL/L (ref 3–14)
BUN SERPL-MCNC: 22 MG/DL (ref 7–30)
CALCIUM SERPL-MCNC: 8.8 MG/DL (ref 8.5–10.1)
CHLORIDE SERPL-SCNC: 98 MMOL/L (ref 94–109)
CO2 SERPL-SCNC: 35 MMOL/L (ref 20–32)
CREAT SERPL-MCNC: 1.03 MG/DL (ref 0.66–1.25)
ERYTHROCYTE [DISTWIDTH] IN BLOOD BY AUTOMATED COUNT: 14.1 % (ref 10–15)
GFR SERPL CREATININE-BSD FRML MDRD: 64 ML/MIN/{1.73_M2}
GLUCOSE BLDC GLUCOMTR-MCNC: 131 MG/DL (ref 70–99)
GLUCOSE BLDC GLUCOMTR-MCNC: 149 MG/DL (ref 70–99)
GLUCOSE BLDC GLUCOMTR-MCNC: 161 MG/DL (ref 70–99)
GLUCOSE BLDC GLUCOMTR-MCNC: 204 MG/DL (ref 70–99)
GLUCOSE BLDC GLUCOMTR-MCNC: 217 MG/DL (ref 70–99)
GLUCOSE SERPL-MCNC: 139 MG/DL (ref 70–99)
HCT VFR BLD AUTO: 37.6 % (ref 40–53)
HGB BLD-MCNC: 12.3 G/DL (ref 13.3–17.7)
MCH RBC QN AUTO: 31 PG (ref 26.5–33)
MCHC RBC AUTO-ENTMCNC: 32.7 G/DL (ref 31.5–36.5)
MCV RBC AUTO: 95 FL (ref 78–100)
PLATELET # BLD AUTO: 271 10E9/L (ref 150–450)
POTASSIUM SERPL-SCNC: 3.5 MMOL/L (ref 3.4–5.3)
RBC # BLD AUTO: 3.97 10E12/L (ref 4.4–5.9)
SODIUM SERPL-SCNC: 136 MMOL/L (ref 133–144)
WBC # BLD AUTO: 7.8 10E9/L (ref 4–11)

## 2021-02-17 PROCEDURE — 99207 PR CDG-MDM COMPONENT: MEETS MODERATE - DOWN CODED: CPT | Performed by: FAMILY MEDICINE

## 2021-02-17 PROCEDURE — 999N001017 HC STATISTIC GLUCOSE BY METER IP

## 2021-02-17 PROCEDURE — 97116 GAIT TRAINING THERAPY: CPT | Mod: GP

## 2021-02-17 PROCEDURE — 250N000013 HC RX MED GY IP 250 OP 250 PS 637: Performed by: FAMILY MEDICINE

## 2021-02-17 PROCEDURE — 250N000013 HC RX MED GY IP 250 OP 250 PS 637: Performed by: PHYSICIAN ASSISTANT

## 2021-02-17 PROCEDURE — 36415 COLL VENOUS BLD VENIPUNCTURE: CPT | Performed by: FAMILY MEDICINE

## 2021-02-17 PROCEDURE — 85027 COMPLETE CBC AUTOMATED: CPT | Performed by: FAMILY MEDICINE

## 2021-02-17 PROCEDURE — 120N000004 HC R&B MS OVERFLOW

## 2021-02-17 PROCEDURE — 99232 SBSQ HOSP IP/OBS MODERATE 35: CPT | Performed by: FAMILY MEDICINE

## 2021-02-17 PROCEDURE — 80048 BASIC METABOLIC PNL TOTAL CA: CPT | Performed by: FAMILY MEDICINE

## 2021-02-17 PROCEDURE — 97161 PT EVAL LOW COMPLEX 20 MIN: CPT | Mod: GP

## 2021-02-17 RX ORDER — FUROSEMIDE 40 MG
40 TABLET ORAL EVERY MORNING
Status: DISCONTINUED | OUTPATIENT
Start: 2021-02-17 | End: 2021-02-18 | Stop reason: HOSPADM

## 2021-02-17 RX ADMIN — AMOXICILLIN AND CLAVULANATE POTASSIUM 1 TABLET: 875; 125 TABLET, FILM COATED ORAL at 08:00

## 2021-02-17 RX ADMIN — INSULIN ASPART 2 UNITS: 100 INJECTION, SOLUTION INTRAVENOUS; SUBCUTANEOUS at 11:53

## 2021-02-17 RX ADMIN — INSULIN HUMAN 20 UNITS: 100 INJECTION, SUSPENSION SUBCUTANEOUS at 22:09

## 2021-02-17 RX ADMIN — FUROSEMIDE 40 MG: 40 TABLET ORAL at 07:51

## 2021-02-17 RX ADMIN — CLOPIDOGREL BISULFATE 75 MG: 75 TABLET ORAL at 16:58

## 2021-02-17 RX ADMIN — AMOXICILLIN AND CLAVULANATE POTASSIUM 1 TABLET: 875; 125 TABLET, FILM COATED ORAL at 20:39

## 2021-02-17 RX ADMIN — SIMVASTATIN 40 MG: 40 TABLET, FILM COATED ORAL at 22:09

## 2021-02-17 RX ADMIN — INSULIN ASPART 1 UNITS: 100 INJECTION, SOLUTION INTRAVENOUS; SUBCUTANEOUS at 16:56

## 2021-02-17 RX ADMIN — TAMSULOSIN HYDROCHLORIDE 0.4 MG: 0.4 CAPSULE ORAL at 22:09

## 2021-02-17 ASSESSMENT — ACTIVITIES OF DAILY LIVING (ADL)
ADLS_ACUITY_SCORE: 17

## 2021-02-17 ASSESSMENT — MIFFLIN-ST. JEOR: SCORE: 1498.63

## 2021-02-17 NOTE — PLAN OF CARE
Pt's lung sounds are CTA/diminished in the bases, he is on RA maintaining his sat's >91%.  Pt had received his 40 mg of IV Lasix at 2000 last evening and has voided large amounts, his weight is down 2.9 kg since yesterday morning. Pt is A&O, he uses his call light appropriately.  Will continue to monitor close for changes.

## 2021-02-17 NOTE — PHARMACY-ADMISSION MEDICATION HISTORY
"Admission medication history interview status for the 2/15/2021 admission is complete. See Epic admission navigator for allergy information, pharmacy, prior to admission medications and immunization status.     Medication history interview sources:  patient, surescripts, Care Everywhere (recent discharge from [facility])    Changes made to PTA medication list (reason)  Added: none  Deleted: atenolol, losartan, novolog, tramadol, valsartan  Changed: metoprolol 50mg daily -> 25mg BID  -patient states taking BID and it is prescribed as such. Of note, Care Everywhere Richard has 50mg daily from his recent discharge    Additional medication history information (including reliability of information, actions taken by pharmacist):  -Per recent [facility] discharge, appears patient should be taking ASPIRIN 81mg daily in addition to Plavix for limb ischemia, but this was not familiar to patient. Stated \"I don't take aspirin, I take tylenol\".      Prior to Admission medications    Medication Sig Last Dose Taking? Auth Provider   acetaminophen (TYLENOL) 500 MG tablet Take 500 mg by mouth 3 times daily as needed PRN Yes Reported, Patient   amoxicillin-clavulanate (AUGMENTIN) 875-125 MG tablet Take 1 tablet by mouth 2 times daily x7 days 2/15/2021 at Unknown time Yes Reported, Patient   clopidogrel (PLAVIX) 75 MG tablet Take 75 mg by mouth daily  2/14/2021 at Unknown time Yes Reported, Patient   ergocalciferol (ERGOCALCIFEROL) 1.25 MG (35930 UT) capsule Take 50,000 Units by mouth once a week Takes on Sundays. Past Week at Unknown time Yes Reported, Patient   fish oil-omega-3 fatty acids (FISH OIL) 1000 MG capsule Take 2 g by mouth daily. 2/15/2021 at Unknown time Yes Reported, Patient   losartan (COZAAR) 50 MG tablet Take 50 mg by mouth daily  2/14/2021 at Unknown time Yes Reported, Patient   metoprolol tartrate (LOPRESSOR) 25 MG tablet Take 25 mg by mouth 2 times daily  2/15/2021 at Unknown time Yes Reported, Patient   NOVOLIN N RELION " 100 UNIT/ML susp INJECT 25 UNITS SUBCUTANEOUSLY IN THE MORNING AND 20 UNITS IN THE EVENING 2/14/2021 at Unknown time Yes Reported, Patient   NOVOLIN R RELION 100 UNIT/ML soln INJECT 3 UNITS SUBCUTANEOUSLY TWICE DAILY BEFORE MEAL(S) 2/14/2021 at Unknown time Yes Reported, Patient   Simvastatin (ZOCOR PO) Take 40 mg by mouth At Bedtime  2/14/2021 at Unknown time Yes Reported, Patient   tamsulosin (FLOMAX) 0.4 MG capsule Take 0.4 mg by mouth daily  2/14/2021 at Unknown time Yes Reported, Patient         Medication history completed by: Farhan Morris [facility]

## 2021-02-17 NOTE — PLAN OF CARE
Pt stated some SOA before supper and NC oxygen applied for comfort, sats 91% RA. Pt ate 100%, pt able to brush his teeth independent and SBA to the BR with cane. Pt states he feels good-no SOB with the walk and sats checked and 94% RA. No c/o pain or discomfort.

## 2021-02-17 NOTE — PROGRESS NOTES
IP Physical Therapy Evaluation     02/17/21 1324   Quick Adds   Type of Visit Initial PT Evaluation   Living Environment   People in home child(rasta), adult   Current Living Arrangements house   Home Accessibility stairs to enter home   Number of Stairs, Main Entrance 3   Stair Railings, Main Entrance railing on right side (ascending)   Transportation Anticipated family or friend will provide   Self-Care   Usual Activity Tolerance moderate   Current Activity Tolerance fair   Regular Exercise No   Equipment Currently Used at Home cane, quad;walker, rolling   Disability/Function   Hearing Difficulty or Deaf no   Wear Glasses or Blind no   Concentrating, Remembering or Making Decisions Difficulty no   Difficulty Communicating no   Difficulty Eating/Swallowing no   Walking or Climbing Stairs Difficulty yes   Walking or Climbing Stairs ambulation difficulty, requires equipment;transferring difficulty, requires equipment   Dressing/Bathing Difficulty no   Toileting issues yes   Toileting Assistance toileting difficulty, assistance 1 person   Doing Errands Independently Difficulty (such as shopping) no   Fall history within last six months no   General Information   Onset of Illness/Injury or Date of Surgery 02/15/21   Referring Physician Kenny Rivera MD   Patient/Family Therapy Goals Statement (PT) Go home with son   Pertinent History of Current Problem (include personal factors and/or comorbidities that impact the POC) Jhoan Curran is a 88 year old male with past medical history of hypertension, PVD, Dm type II, GERD, CAD (s/p 3 vessel CABG 2008),SILVANO who now presents on 2/15/2021 with shortness of breath.    Existing Precautions/Restrictions no known precautions/restrictions   Cognition   Orientation Status (Cognition) oriented x 3   Pain Assessment   Patient Currently in Pain No   Integumentary/Edema   Integumentary/Edema other (describe)   Integumentary/Edema Comments 2+ edema BLE   Posture    Posture  Forward head position;Kyphosis   Range of Motion (ROM)   ROM Comment LE WFL   Strength   Strength Comments Deconditioned, BLE 4/5 generally   Bed Mobility   Bed Mobility supine-sit;sit-supine   Supine-Sit [place] (Bed Mobility) supervision   Sit-Supine [place] (Bed Mobility) supervision   Impairments Contributing to Impaired Bed Mobility decreased strength   Assistive Device (Bed Mobility) bed rails   Comment (Bed Mobility) Increased time and effort to complete task but able to tolerate and complete with good body mechanics   Transfers   Transfers sit-stand transfer   Impairments Contributing to Impaired Transfers decreased strength   Sit-Stand Transfer   Sit-Stand [place] (Transfers) supervision   Assistive Device (Sit-Stand Transfers) cane, quad   Sit/Stand Transfer Comments Patient able to come to complete stand with use of UE to push, no reports of dizziness.   Gait/Stairs (Locomotion)   [place] Level (Gait) contact guard   Assistive Device (Gait) cane, straight   Distance in Feet (Required for LE Total Joints) 125   Pattern (Gait) step-through   Deviations/Abnormal Patterns (Gait) gait speed decreased;base of support, wide;stride length decreased   Comment (Gait/Stairs) Ambulated in hallway with SEC, increased lateral sway and flexed posture. Easily fatigued with two standing rest breaks for 30 seconds each. VC for upright posture, breathing technique, sequencing with cane - improved sequencing after education   Balance   Balance other (describe)   Balance Comments Unsteady with ambulation   Sensory Examination   Sensory Perception WNL   Coordination   Coordination no deficits were identified   Muscle Tone   Muscle Tone no deficits were identified   Clinical Impression   Criteria for Skilled Therapeutic Intervention yes, treatment indicated   PT Diagnosis (PT) Decreased activity tolerance   Influenced by the following impairments Decreased activity tolerance, LE weakness   Functional  limitations due to impairments Mobility, transfers   Clinical Presentation Stable/Uncomplicated   Clinical Presentation Rationale Musculoskeletal system primarily involved   Clinical Decision Making (Complexity) low complexity   Therapy Frequency (PT) Daily   Predicted Duration of Therapy Intervention (days/wks) 3   Planned Therapy Interventions (PT) gait training;strengthening;transfer training;balance training;bed mobility training;neuromuscular re-education;home exercise program   Anticipated Equipment Needs at Discharge (PT) cane, straight  (Patient owns SEC)   Risk & Benefits of therapy have been explained evaluation/treatment results reviewed;care plan/treatment goals reviewed;risks/benefits reviewed;current/potential barriers reviewed;participants voiced agreement with care plan   PT Discharge Planning    PT Discharge Recommendation (DC Rec) home with home care physical therapy   PT Rationale for DC Rec Patient is near baseline mobility level but demonstrates poor activity tolerance due to cardiovascular and musculoskeletal weakness. Further home PT recommended to improve activity tolerance and increase ease of mobility   PT Brief overview of current status  CGA ambulation, SBA transfers   Total Evaluation Time   Total Evaluation Time (Minutes) 8     - Mason Bell PT, DPT

## 2021-02-17 NOTE — PROGRESS NOTES
[facility]      Subjective:  Breathing is a lot better  No cp      Review of Systems:  CONSTITUTIONAL: NEGATIVE for fever, chills, change in weight  INTEGUMENTARY/SKIN: NEGATIVE for worrisome rashes, moles or lesions  EYES: NEGATIVE for vision changes or irritation  ENT/MOUTH: NEGATIVE for ear, mouth and throat problems  RESP:breathing better  BREAST: NEGATIVE for masses, tenderness or discharge  CV: less edema  GI: NEGATIVE for nausea, abdominal pain, heartburn, or change in bowel habits  : NEGATIVE for frequency, dysuria, or hematuria  MUSCULOSKELETAL: NEGATIVE for significant arthralgias or myalgia  NEURO: NEGATIVE for weakness, dizziness or paresthesias  ENDOCRINE: NEGATIVE for temperature intolerance, skin/hair changes  HEME: NEGATIVE for bleeding problems  PSYCHIATRIC: NEGATIVE for changes in mood or affect    Physical Exam:  Vitals Were Reviewed    Patient Vitals for the past 16 hrs:   BP Temp Temp src Pulse Resp SpO2 Weight   02/17/21 0600 107/44 -- -- 72 12 -- --   02/17/21 0530 -- -- -- -- -- -- 87 kg (191 lb 12.8 oz)   02/17/21 0400 109/55 98.2  F (36.8  C) Oral 74 15 92 % --   02/17/21 0200 109/47 -- -- 72 16 93 % --   02/17/21 0000 127/72 97.5  F (36.4  C) Oral 73 18 94 % --   02/16/21 2200 106/86 -- -- 80 19 90 % --   02/16/21 2040 124/63 97.4  F (36.3  C) Oral 73 18 92 % --   02/16/21 2000 -- -- -- 70 -- -- --   02/16/21 1851 104/83 -- -- 93 20 94 % --   02/16/21 1843 -- -- -- -- -- 93 % --   02/16/21 1800 -- -- -- 83 -- 96 % --   02/16/21 1600 125/61 -- -- 70 20 -- --         Intake/Output Summary (Last 24 hours) at 2/17/2021 0701  Last data filed at 2/17/2021 0500  Gross per 24 hour   Intake 590 ml   Output 2150 ml   Net -1560 ml       GENERAL APPEARANCE: healthy, alert and no distress  RESP: a few scattered bibasilar crackles  CV: regular rate and rhythm, normal S1 S2, no S3 or S4 and no murmur, click or rub   ABDOMEN: soft, nontender, no HSM or masses and bowel sounds  normal  MS: mild edema  SKIN: clear without significant rashes or lesions    Lab:  Recent Labs   Lab Test 02/17/21  0441 02/16/21  0457    139   POTASSIUM 3.5 3.9   CHLORIDE 98 101   CO2 35* 33*   ANIONGAP 3 5   * 143*   BUN 22 17   CR 1.03 0.91   SASHA 8.8 9.2     CBC RESULTS:   Recent Labs   Lab Test 02/17/21  0441 02/16/21  0457   WBC 7.8 8.5   RBC 3.97* 4.22*   HGB 12.3* 12.8*   HCT 37.6* 40.8    308       Results for orders placed or performed during the hospital encounter of 02/15/21 (from the past 24 hour(s))   Glucose by meter   Result Value Ref Range    Glucose 134 (H) 70 - 99 mg/dL   Care Management / Social Work IP Consult    Narrative    Scarlett Desir RN     2/16/2021 11:01 AM  Care Management Initial Consult    General Information  Assessment completed with: Patient Jhoan  Type of CM/SW Visit: Offer D/C Planning    Primary Care Provider verified and updated as needed: Yes   Readmission within the last 30 days: current reason for admission   unrelated to previous admission   Return Category: New Diagnosis     Advance Care Planning: Advance Care Planning Reviewed: no   concerns identified          Communication Assessment  Patient's communication style: spoken language (English or   Bilingual)    Hearing Difficulty or Deaf: no   Wear Glasses or Blind: no    Cognitive  Cognitive/Neuro/Behavioral: WDL                      Living Environment:   People in home: child(rasta), adult  Ned  Current living Arrangements: house      Able to return to prior arrangements: yes       Family/Social Support:  Care provided by: self  Provides care for: no one     Children          Description of Support System: Supportive, Involved    Support Assessment: Adequate family and caregiver support    Current Resources:   Patient receiving home care services: Yes  Skilled Home Care Services: Skilled Nursing, Home Health Aid  Community Resources: None  Equipment currently used at home: cane, quad, walker,  rolling    Financial Concerns: No concerns identified           Lifestyle & Psychosocial Needs:        Socioeconomic History     Marital status:      Spouse name: Not on file     Number of children: Not on file     Years of education: Not on file     Highest education level: Not on file     Tobacco Use     Smoking status: Never Smoker     Smokeless tobacco: Never Used   Substance and Sexual Activity     Alcohol use: No     Drug use: No     Sexual activity: Not Currently         Mental Health Status:  Mental Health Status: No Current Concerns       Chemical Dependency Status:  Chemical Dependency Status: No Current Concerns            Additional Information:    Met with the patient for discharge planning. Introduced myself   and role.    The patient's son Ned lives with him in the community in a   home.  The patient is independent with cares.  He is currently   open with [facility] ([phone] Fax: [phone]) RN   and HHA.  Discussed resuming home care.  Referral placed to   resume home care.  He ambulates with a cane or walker.  His 3   children live locally and are able to assist as needed.    Patient was admitted to [facility] -2/10/21 after   reporting right foot swelling and redness.  He had cellulitis,   underwent an angiogram and had an arthrectomy with angioplasty of   the right superficial femoral artery and popliteal arteries.     Plan:  Home with resumption of [facility].       Scarlett Desir RN         Echocardiogram Complete    Narrative    [id number]  [id number]  [id number]  [id number]^VERNON^GAETANO^SUSAN           [facility]  Echocardiography Laboratory  [address] [place] [address]        Name: LAURE THOMASON  MRN: [id number]  : 1932  Study Date: 2021 11:02 AM  Age: 88 yrs  Gender: Male  Patient Location: [facility]  Reason For Study: Dyspnea  Ordering Physician: GAETANO JUNIOR  Referring Physician: Sukumar Escobar  Performed By: Linsey  MARCEL Myers     BSA: 2.0 m2  Height: 67 in  Weight: 198 lb  HR: 73  BP: 114/53 mmHg  _____________________________________________________________________________  __        Procedure  Complete Portable Echo Adult. Optison (NDC #[id number]) given intravenously.  _____________________________________________________________________________  __        Interpretation Summary     Poor image quality  Left ventricular systolic function is normal.  The visual ejection fraction is estimated at 55-60%.  The right ventricle is not well visualized.  The right ventricular systolic function is mildly reduced.  Right ventricular systolic pressure is elevated, consistent with mild to  moderate pulmonary hypertension.  Mild valvular aortic stenosis.  There is no comparison study available.  _____________________________________________________________________________  __        Left Ventricle  The left ventricle is normal in size. There is mild concentric left  ventricular hypertrophy. Proximal septal thickening is noted. Grade I or early  diastolic dysfunction. Left ventricular systolic function is normal. The  visual ejection fraction is estimated at 55-60%. No regional wall motion  abnormalities noted.     Right Ventricle  The right ventricle is mildly dilated. The right ventricle is not well  visualized. The right ventricular systolic function is mildly reduced.     Atria  The left atrium is not well visualized. The left atrium is mildly dilated.  Right atrium not well visualized. Right atrial size is normal.     Mitral Valve  The mitral valve is not well visualized. There is no mitral regurgitation  noted. There is no mitral valve stenosis.        Tricuspid Valve  The tricuspid valve is not well visualized. There is trace tricuspid  regurgitation. The right ventricular systolic pressure is elevated at 39.4  mmHg. Right ventricular systolic pressure is elevated, consistent with mild to  moderate pulmonary hypertension.     Aortic  Valve  There is severe trileaflet aortic sclerosis. The peak AoV pressure gradient is  23.4 mmHg. The mean AoV pressure gradient is 9.7 mmHg. The calculated aortic  valve are is 1.7 cm^2. Mild valvular aortic stenosis.     Pulmonic Valve  The pulmonic valve is not well seen, but is grossly normal. There is mild to  moderate (1-2+) pulmonic valvular regurgitation.     Vessels  Mild aortic root dilatation. Inferior vena cava not well visualized for  estimation of right atrial pressure.     Pericardium  The pericardium is not well visualized.     _____________________________________________________________________________  __  MMode/2D Measurements & Calculations  IVSd: 1.3 cm  LVIDd: 3.8 cm  LVIDs: 2.7 cm  LVPWd: 1.5 cm  FS: 29.6 %  LV mass(C)d: 196.0 grams  LV mass(C)dI: 97.3 grams/m2     Ao root diam: 4.0 cm  LA dimension: 4.0 cm  asc Aorta Diam: 3.5 cm  LA/Ao: 0.99  LVOT diam: 2.4 cm  LVOT area: 4.6 cm2  LA Volume (BP): 59.0 ml  LA Volume Index (BP): 29.4 ml/m2  RWT: 0.80        Doppler Measurements & Calculations  MV E max milton: 71.3 cm/sec  MV A max milton: 76.1 cm/sec  MV E/A: 0.94  MV dec time: 0.23 sec     Ao V2 max: 241.9 cm/sec  Ao max P.4 mmHg  Ao V2 mean: 139.4 cm/sec  Ao mean P.7 mmHg  Ao V2 VTI: 39.3 cm  ELAINA(I,D): 1.7 cm2  ELANIA(V,D): 1.4 cm2  LV V1 max P.0 mmHg  LV V1 max: 71.0 cm/sec  LV V1 VTI: 14.6 cm  SV(LVOT): 67.9 ml  SI(LVOT): 33.7 ml/m2  PA acc time: 0.11 sec  TR max milton: 313.7 cm/sec  TR max P.4 mmHg  AV Milton Ratio (DI): 0.29  ELAINA Index (cm2/m2): 0.86  E/E' av.0  Lateral E/e': 8.3  Medial E/e': 9.7              _____________________________________________________________________________  __        Report approved by: Willow Maya 2021 12:08 PM      Glucose by meter   Result Value Ref Range    Glucose 151 (H) 70 - 99 mg/dL   Glucose by meter   Result Value Ref Range    Glucose 162 (H) 70 - 99 mg/dL   Glucose by meter   Result Value Ref Range    Glucose 242 (H) 70  - 99 mg/dL   Glucose by meter   Result Value Ref Range    Glucose 149 (H) 70 - 99 mg/dL   CBC with platelets   Result Value Ref Range    WBC 7.8 4.0 - 11.0 10e9/L    RBC Count 3.97 (L) 4.4 - 5.9 10e12/L    Hemoglobin 12.3 (L) 13.3 - 17.7 g/dL    Hematocrit 37.6 (L) 40.0 - 53.0 %    MCV 95 78 - 100 fl    MCH 31.0 26.5 - 33.0 pg    MCHC 32.7 31.5 - 36.5 g/dL    RDW 14.1 10.0 - 15.0 %    Platelet Count 271 150 - 450 10e9/L   Basic metabolic panel   Result Value Ref Range    Sodium 136 133 - 144 mmol/L    Potassium 3.5 3.4 - 5.3 mmol/L    Chloride 98 94 - 109 mmol/L    Carbon Dioxide 35 (H) 20 - 32 mmol/L    Anion Gap 3 3 - 14 mmol/L    Glucose 139 (H) 70 - 99 mg/dL    Urea Nitrogen 22 7 - 30 mg/dL    Creatinine 1.03 0.66 - 1.25 mg/dL    GFR Estimate 64 >60 mL/min/[1.73_m2]    GFR Estimate If Black 74 >60 mL/min/[1.73_m2]    Calcium 8.8 8.5 - 10.1 mg/dL       Assessment and Plan:    Jhoan Curran is a 88 year old male who presents on 2/15/2021 with shortness of breath.      Acute respiratory failure with hypoxia and hypercapnia  Acute diastolic congestive heart failure  Pulmonary edema/large bilat pleural effusions  Patient has had ongoing shortness of breath since prior hospitalization..  He tells me that he was getting IV Lasix while in the hospital though was not discharged on anything.  He admits to shortness of breath with activity, shortness of breath was worse last night when he was trying to lay down to sleep.  He has a mild cough.  He admits to bilateral lower extremity edema.  He is not sure if he has gained any weight.  Pt has  acute decompensated heart failure given elevated BNP of 5472, large bilateral pleural effusions and bibasilar atelectasis, consolidation and pulmonary edema; he reported his symptoms have improved after receiving IV Lasix 40 mg x 2.  Covid and influenza negative.  No evidence for PE on CT imaging.  Pneumonia unlikely with no white count, no fever this seems less likely.   Echocardiogram from 1/6/21 revealed EF of 55%.   Patient was started on BiPAP in the emergency department and given iv lasix.      Echo:  Poor image quality  Left ventricular systolic function is normal.  The visual ejection fraction is estimated at 55-60%.  The right ventricle is not well visualized.  The right ventricular systolic function is mildly reduced.  Right ventricular systolic pressure is elevated, consistent with mild to  moderate pulmonary hypertension.  Mild valvular aortic stenosis.  There is no comparison study available    Currently room air  I/O -6365  creat 0.90 --0.91--1.03  Weight down about 8 pounds  Will stop iv lasix , start 40 mg per day oral and observe  Some of the problem may have been related to fluids given during the last hospitalization.      Elevated troponin  Initial troponin 0 0.047--0.051--0.044.  Suspect troponin leak secondary to demand ischemia.  Will not trend further.     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  He had cellulitis in setting of critical limb ischemia, underwent angiogram and had an arthrectomy with drug-coated balloon angioplasty of the right superficial femoral artery and popliteal arteries.  Unfortunately he suffered a complication of progressively gangrenous right great toe, will likely need amputation.  Currently they are awaiting to see where the dead tissue well stop.  He was given Plavix and Augmentin to take outpatient.  Patient was recent hospitalized at [facility].  He is following with podiatry.  Last seen in 2-15-21.  Eventually will need right great toe amputation.   - Continue prior to admission Plavix.   - Continue prior to admission Augmentin.      HTN (hypertension)  Continue pta metoprolol  Looks like he is on once daily metoprolol -stop atenolol      Overflow  incontinence  Continue prior to admission Flomax.      Hyperlipidemia LDL goal <100  Continue prior to admission simvastatin.     Diabetes mellitus, type 2   Patient managed prior to admission with 25 units of insulin NPH in the morning and 20 units in the evening and sliding scale.  Hemoglobin A1c 2/15, 6.5.   Continue prior to admission insulin NPH.  Insulin sliding scale.     CAD (coronary artery disease)  Patient has history of three-vessel CABG at [facility] in October 2008.     COVID-19 ruled out  Patient was tested for hospital admission.  Negative.   -No precautions.     Diet:  Moderate CHO   DVT Prophylaxis: plavix, pcds.   M[place]enhall Catheter: not present  Code Status:   Full      Plan-will start lasix 40 mg daily po, watch until tomorrow-if stable then, likely discharge. He then needs to follow up with Dr Machuca as an out pt.     Discussed with son.

## 2021-02-18 ENCOUNTER — APPOINTMENT (OUTPATIENT)
Dept: PHYSICAL THERAPY | Facility: CLINIC | Age: 86
DRG: 291 | End: 2021-02-18
Payer: COMMERCIAL

## 2021-02-18 VITALS
HEART RATE: 105 BPM | WEIGHT: 189.6 LBS | HEIGHT: 67 IN | BODY MASS INDEX: 29.76 KG/M2 | DIASTOLIC BLOOD PRESSURE: 69 MMHG | OXYGEN SATURATION: 95 % | RESPIRATION RATE: 18 BRPM | TEMPERATURE: 98.2 F | SYSTOLIC BLOOD PRESSURE: 110 MMHG

## 2021-02-18 LAB
ANION GAP SERPL CALCULATED.3IONS-SCNC: 4 MMOL/L (ref 3–14)
BUN SERPL-MCNC: 19 MG/DL (ref 7–30)
CALCIUM SERPL-MCNC: 8.8 MG/DL (ref 8.5–10.1)
CHLORIDE SERPL-SCNC: 101 MMOL/L (ref 94–109)
CO2 SERPL-SCNC: 33 MMOL/L (ref 20–32)
CREAT SERPL-MCNC: 0.99 MG/DL (ref 0.66–1.25)
ERYTHROCYTE [DISTWIDTH] IN BLOOD BY AUTOMATED COUNT: 14 % (ref 10–15)
GFR SERPL CREATININE-BSD FRML MDRD: 67 ML/MIN/{1.73_M2}
GLUCOSE BLDC GLUCOMTR-MCNC: 162 MG/DL (ref 70–99)
GLUCOSE BLDC GLUCOMTR-MCNC: 174 MG/DL (ref 70–99)
GLUCOSE SERPL-MCNC: 159 MG/DL (ref 70–99)
HCT VFR BLD AUTO: 36.8 % (ref 40–53)
HGB BLD-MCNC: 12.4 G/DL (ref 13.3–17.7)
MCH RBC QN AUTO: 31.3 PG (ref 26.5–33)
MCHC RBC AUTO-ENTMCNC: 33.7 G/DL (ref 31.5–36.5)
MCV RBC AUTO: 93 FL (ref 78–100)
PLATELET # BLD AUTO: 249 10E9/L (ref 150–450)
POTASSIUM SERPL-SCNC: 3.7 MMOL/L (ref 3.4–5.3)
RBC # BLD AUTO: 3.96 10E12/L (ref 4.4–5.9)
SODIUM SERPL-SCNC: 138 MMOL/L (ref 133–144)
WBC # BLD AUTO: 8.2 10E9/L (ref 4–11)

## 2021-02-18 PROCEDURE — 80048 BASIC METABOLIC PNL TOTAL CA: CPT | Performed by: FAMILY MEDICINE

## 2021-02-18 PROCEDURE — 250N000013 HC RX MED GY IP 250 OP 250 PS 637: Performed by: FAMILY MEDICINE

## 2021-02-18 PROCEDURE — 250N000013 HC RX MED GY IP 250 OP 250 PS 637: Performed by: INTERNAL MEDICINE

## 2021-02-18 PROCEDURE — 99239 HOSP IP/OBS DSCHRG MGMT >30: CPT | Performed by: INTERNAL MEDICINE

## 2021-02-18 PROCEDURE — 36415 COLL VENOUS BLD VENIPUNCTURE: CPT | Performed by: FAMILY MEDICINE

## 2021-02-18 PROCEDURE — 999N001017 HC STATISTIC GLUCOSE BY METER IP

## 2021-02-18 PROCEDURE — 85027 COMPLETE CBC AUTOMATED: CPT | Performed by: FAMILY MEDICINE

## 2021-02-18 PROCEDURE — 250N000013 HC RX MED GY IP 250 OP 250 PS 637: Performed by: PHYSICIAN ASSISTANT

## 2021-02-18 PROCEDURE — 97110 THERAPEUTIC EXERCISES: CPT | Mod: GP

## 2021-02-18 PROCEDURE — 97116 GAIT TRAINING THERAPY: CPT | Mod: GP

## 2021-02-18 RX ORDER — CARVEDILOL 3.12 MG/1
3.12 TABLET ORAL 2 TIMES DAILY WITH MEALS
Status: DISCONTINUED | OUTPATIENT
Start: 2021-02-18 | End: 2021-02-18 | Stop reason: HOSPADM

## 2021-02-18 RX ORDER — CARVEDILOL 6.25 MG/1
3.12 TABLET ORAL 2 TIMES DAILY WITH MEALS
Qty: 30 TABLET | Refills: 0 | Status: SHIPPED | OUTPATIENT
Start: 2021-02-18

## 2021-02-18 RX ORDER — FUROSEMIDE 40 MG
40 TABLET ORAL EVERY MORNING
Qty: 30 TABLET | Refills: 0 | Status: SHIPPED | OUTPATIENT
Start: 2021-02-19 | End: 2022-01-01

## 2021-02-18 RX ADMIN — CARVEDILOL 3.12 MG: 3.12 TABLET, FILM COATED ORAL at 09:10

## 2021-02-18 RX ADMIN — INSULIN ASPART 1 UNITS: 100 INJECTION, SOLUTION INTRAVENOUS; SUBCUTANEOUS at 08:29

## 2021-02-18 RX ADMIN — AMOXICILLIN AND CLAVULANATE POTASSIUM 1 TABLET: 875; 125 TABLET, FILM COATED ORAL at 08:26

## 2021-02-18 RX ADMIN — FUROSEMIDE 40 MG: 40 TABLET ORAL at 08:26

## 2021-02-18 ASSESSMENT — ACTIVITIES OF DAILY LIVING (ADL)
ADLS_ACUITY_SCORE: 17

## 2021-02-18 ASSESSMENT — MIFFLIN-ST. JEOR: SCORE: 1488.63

## 2021-02-18 NOTE — PLAN OF CARE
Physical Therapy Discharge Summary    Reason for therapy discharge:    Discharged to home.    Progress towards therapy goal(s). See goals on Care Plan in [facility] electronic health record for goal details.  Goals partially met.  Barriers to achieving goals:   discharge from facility.    Therapy recommendation(s):    Continued therapy is recommended.  Rationale/Recommendations:  Further skilled PT recommended to help patient improve activity tolerance.

## 2021-02-18 NOTE — PROGRESS NOTES
Pt continues to diuresis, his weight is down another 1 KG.  Will continue to monitor close for changes.

## 2021-02-18 NOTE — PROGRESS NOTES
[facility] DISCHARGE NOTE    Patient discharged to home at 11:25 AM via wheel chair. Accompanied by other:none and staff. Discharge instructions reviewed with patient, opportunity offered to ask questions. Prescriptions sent to patients preferred pharmacy. All belongings sent with patient.  1125-Discharge instructions reviewed with pt and pt stated verbal understanding of discharge instructions. Copy of discharge instructions given to pt and pt instructed to bring copy to next clinic appointment. Pt instructed to call and make own follow up appointments for his primary physician.  New medications reviewed with pt and patients questions answered about new medications. Pt given side effects paper for home reference, and medication side effects reviewed over with pt. Pt discharged at 1125, via wheelchair with all belongings. Erlin Ling RN,BC, CCRN

## 2021-02-18 NOTE — PROGRESS NOTES
Care Management Discharge Note    Discharge Date: 02/19/21       Discharge Disposition: Home Care    Discharge Services:      Discharge DME:      Discharge Transportation: family or friend will provide    Private pay costs discussed: Not applicable    PAS Confirmation Code:  Not needed  Patient/family educated on Medicare website which has current facility and service quality ratings: yes    Education Provided on the Discharge Plan:  yes  Persons Notified of Discharge Plans: pt, MD, family  Patient/Family in Agreement with the Plan: yes    Handoff Referral Completed: No    Additional Information:    Pt is discharging home today and will resume his home care with [facility] ([phone] Fax: [phone]). This writer did notify home care team that pt is returning back to his private home today.       COLE Torrez   [facility] [phone]

## 2021-02-18 NOTE — DISCHARGE SUMMARY
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  The following labs/tests are recommended: BMP and   CBC.           Unresulted Labs Ordered in the Past 30 Days of this Admission     No orders found from 1/16/2021 to 2/16/2021.      These results will be followed up by Pj Vega or PCP    Discharge Disposition   Discharged to home  Condition at discharge: Stable    Hospital Course   Jhoan Curran is a 88 year old male who presents on 2/15/2021 with shortness of breath.      Acute respiratory failure with hypoxia and hypercapnia  Acute diastolic congestive heart failure  Pulmonary edema/large bilat pleural effusions  Patient has had ongoing shortness of breath since prior hospitalization..  He tells me that he was getting IV Lasix while in the hospital though was not discharged on anything.  He admits to shortness of breath with activity, shortness of breath was worse last night when he was trying to lay down to sleep.  He has a mild cough.  He admits to bilateral lower extremity edema.  He is not sure if he has gained any weight.  Pt has  acute decompensated heart failure given elevated BNP of 5472, large  bilateral pleural effusions and bibasilar atelectasis, consolidation and pulmonary edema; he reported his symptoms have improved after receiving IV Lasix 40 mg x 2.  Covid and influenza negative.  No evidence for PE on CT imaging.  Pneumonia unlikely with no white count, no fever this seems less likely.  Echocardiogram from 1/6/21 revealed EF of 55%.    - Patient was started on BiPAP in the emergency department and given iv lasix.   - Echo demonstrated poor image quality, left ventricular systolic function is normal, visual ejection fraction is estimated at 55-60%, right ventricle is not well visualized with systolic function is mildly reduced and systolic pressure is elevated, consistent with mild to moderate pulmonary hypertension.  Mild valvular aortic stenosis.  There is no comparison study available.  - Currently room air  - Creat 0.90 --0.91--1.03  - Weight down about 8 pounds  - Will stop iv lasix , start 40 mg per day oral and observe  - Some of the problem may have been related to fluids given during the last hospitalization.  - Breathing comfortably on room air at time of discharge to home  - Start carvedilol 3.125 mg bid  - Follow up BMP with PCP      Elevated troponin  Initial troponin 0 0.047--0.051--0.044.  Suspect troponin leak secondary to demand ischemia.  Will not trend further.     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  He had cellulitis in setting of critical limb ischemia, underwent angiogram and had an arthrectomy with drug-coated balloon angioplasty of the right superficial femoral artery and popliteal arteries.  Unfortunately he suffered a complication of progressively gangrenous right great toe, will likely need amputation.  Currently they are awaiting to see where the dead tissue well stop.  He was given Plavix and  Augmentin to take outpatient.  Patient was recent hospitalized at [facility].  He is following with podiatry.  Last seen in 2-15-21.  Eventually will need right great toe amputation.   - Continue prior to admission Plavix.   - Repeat course of Augmentin with follow up with Vascular Surgery      HTN (hypertension)  Continue pta metoprolol  - Looks like he is on once daily metoprolol - stop atenolol  - Discontinue losartan and metoprolol due to low BP  - Start low dose carvedilol 3.125 mg bid      Overflow incontinence  Continue prior to admission Flomax.      Hyperlipidemia LDL goal <100  Continue prior to admission simvastatin.     Diabetes mellitus, type 2   Patient managed prior to admission with 25 units of insulin NPH in the morning and 20 units in the evening and sliding scale.  Hemoglobin A1c 2/15, 6.5.   - Resume prior to admission insulin NPH.     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     COVID-19 ruled out  Patient was tested for hospital admission.  Negative.   -No precautions.    Consultations This Hospital Stay   CARE MANAGEMENT / SOCIAL WORK IP CONSULT  PHYSICAL THERAPY ADULT IP CONSULT    Code Status   Full Code    Time Spent on this Encounter   I, Pj Vega MD, personally saw the patient today and spent greater than 30 minutes discharging this patient.       Pj Vega MD  [facility]  ______________________________________________________________________    Physical Exam   Vital Signs: Temp: 98.2  F (36.8  C) Temp src: Oral BP: 110/69 Pulse: 105   Resp: 18 SpO2: 95 % O2 Device: None (Room air)    Weight: 189 lbs 9.53 oz       Gen: Well nourished, well developed, alert and oriented x 3, no acute distressed  HEENT: Atraumatic, normocephalic; sclera non-injected, anicterric; oral mucosa moist, no lesion, no exudate  Lungs: Clear to ausculation, no wheezes, no rhonchi, no rales  Heart:  Regular rate, regular rhythm, no gallops, no rubs, no murmurs  GI: Bowel sound normal, no hepatosplenomegaly, no masses, non-tender, non-distended, no guarding, no rebound tenderness  Lymph: No lymphadenopathy, 1+ RLE edema  Skin: No rashes, no chronic venous stasis     Primary Care Physician   Sukumar Escobar    Discharge Orders      Home Care Referral      Reason for your hospital stay    This is an 88 year old male admitted with acute heart failure.     Follow-up and recommended labs and tests    Follow up with primary care provider, Sukumar Escobar, within 7 days for hospital follow- up.  The following labs/tests are recommended: BMP and CBC.     Activity    Your activity upon discharge: activity as tolerated     Diet    Follow this diet upon discharge: Orders Placed This Encounter      Low Salt Low Fat Moderate Consistent CHO Diet       Significant Results and Procedures   Most Recent 3 CBC's:  Recent Labs   Lab Test 02/18/21  0512 02/17/21  0441 02/16/21  0457   WBC 8.2 7.8 8.5   HGB 12.4* 12.3* 12.8*   MCV 93 95 97    271 308     Most Recent 3 BMP's:  Recent Labs   Lab Test 02/18/21  0512 02/17/21  0441 02/16/21  0457    136 139   POTASSIUM 3.7 3.5 3.9   CHLORIDE 101 98 101   CO2 33* 35* 33*   BUN 19 22 17   CR 0.99 1.03 0.91   ANIONGAP 4 3 5   SASHA 8.8 8.8 9.2   * 139* 143*   ,   Results for orders placed or performed during the hospital encounter of 02/15/21   US Lower Extremity Venous Duplex Bilateral    Narrative    VENOUS ULTRASOUND BOTH LEGS  2/15/2021 3:00 PM     HISTORY: worsening swelling bilateral lower extremities, recent  hospital admission, pleuritic chest pain, and sob.    COMPARISON: None.    FINDINGS:  Examination of the deep veins with graded compression and  color flow Doppler with spectral wave form analysis was performed.   There is no evidence for DVT in the lower extremities. The greater  saphenous veins are not visualized due to previous harvesting.      Impression     IMPRESSION: No evidence of deep venous thrombosis.    ALEXY HILL MD   CT Chest Pulmonary Embolism w Contrast    Narrative    CT CHEST PULMONARY EMBOLISM W CONTRAST 2/15/2021 3:32 PM    CLINICAL HISTORY: PE suspected, low/intermediate prob, positive  D-dimer; heart failure sob  TECHNIQUE: CT angiogram chest during arterial phase injection IV  contrast. 2D and 3D MIP reconstructions were performed by the CT  technologist. Dose reduction techniques were used.     CONTRAST: 83 mL Isovue 370    COMPARISON: Chest x-ray 3/22/2018    FINDINGS:  ANGIOGRAM CHEST: No pulmonary emboli.    LUNGS AND PLEURA: Large bilateral pleural effusions and bibasilar  atelectasis/consolidation. Mild interlobular septal thickening and  groundglass opacities in the lung apices compatible with pulmonary  edema. Linear atelectasis in the lingula. Anterior right middle lobe 3  mm subpleural nodule (series 7, image 142).    MEDIASTINUM/AXILLAE: No lymphadenopathy. Normal heart size. CABG. No  pericardial effusion. Tiny hiatal hernia.    UPPER ABDOMEN: Cholecystectomy. Partly visualized left renal cyst  containing simple fluid where visualized. Duodenal diverticuli.  Splenule. Atherosclerotic aortic calcifications.    MUSCULOSKELETAL: Median sternotomy. Old healed left rib fractures.  Degenerative changes in the spine. No concerning lesions in the bones.  Mild wedge compression deformity of the upper endplate of L1.      Impression    IMPRESSION:  1.  No pulmonary emboli.  2.  Large bilateral pleural effusions and bibasilar  atelectasis/consolidation.  3.  Pulmonary edema.    VERO SHARPE MD   Echocardiogram Complete    Narrative    [id number]  [id number]  [id number]  [id number]^VERNON^GAETANO^SUSAN           [facility]  Echocardiography Laboratory  [address] [place] [address]        Name: LAURE THOMASON  MRN: [id number]  : 1932  Study Date: 2021 11:02 AM  Age: 88 yrs  Gender: Male  Patient Location:  [facility]  Reason For Study: Dyspnea  Ordering Physician: GAETANO JUNIOR  Referring Physician: Sukumar Escobar  Performed By: Linsey Myers RDCS     BSA: 2.0 m2  Height: 67 in  Weight: 198 lb  HR: 73  BP: 114/53 mmHg  _____________________________________________________________________________  __        Procedure  Complete Portable Echo Adult. Optison (NDC #[id number]) given intravenously.  _____________________________________________________________________________  __        Interpretation Summary     Poor image quality  Left ventricular systolic function is normal.  The visual ejection fraction is estimated at 55-60%.  The right ventricle is not well visualized.  The right ventricular systolic function is mildly reduced.  Right ventricular systolic pressure is elevated, consistent with mild to  moderate pulmonary hypertension.  Mild valvular aortic stenosis.  There is no comparison study available.  _____________________________________________________________________________  __        Left Ventricle  The left ventricle is normal in size. There is mild concentric left  ventricular hypertrophy. Proximal septal thickening is noted. Grade I or early  diastolic dysfunction. Left ventricular systolic function is normal. The  visual ejection fraction is estimated at 55-60%. No regional wall motion  abnormalities noted.     Right Ventricle  The right ventricle is mildly dilated. The right ventricle is not well  visualized. The right ventricular systolic function is mildly reduced.     Atria  The left atrium is not well visualized. The left atrium is mildly dilated.  Right atrium not well visualized. Right atrial size is normal.     Mitral Valve  The mitral valve is not well visualized. There is no mitral regurgitation  noted. There is no mitral valve stenosis.        Tricuspid Valve  The tricuspid valve is not well visualized. There is trace tricuspid  regurgitation. The right ventricular systolic pressure is elevated at  39.4  mmHg. Right ventricular systolic pressure is elevated, consistent with mild to  moderate pulmonary hypertension.     Aortic Valve  There is severe trileaflet aortic sclerosis. The peak AoV pressure gradient is  23.4 mmHg. The mean AoV pressure gradient is 9.7 mmHg. The calculated aortic  valve are is 1.7 cm^2. Mild valvular aortic stenosis.     Pulmonic Valve  The pulmonic valve is not well seen, but is grossly normal. There is mild to  moderate (1-2+) pulmonic valvular regurgitation.     Vessels  Mild aortic root dilatation. Inferior vena cava not well visualized for  estimation of right atrial pressure.     Pericardium  The pericardium is not well visualized.     _____________________________________________________________________________  __  MMode/2D Measurements & Calculations  IVSd: 1.3 cm  LVIDd: 3.8 cm  LVIDs: 2.7 cm  LVPWd: 1.5 cm  FS: 29.6 %  LV mass(C)d: 196.0 grams  LV mass(C)dI: 97.3 grams/m2     Ao root diam: 4.0 cm  LA dimension: 4.0 cm  asc Aorta Diam: 3.5 cm  LA/Ao: 0.99  LVOT diam: 2.4 cm  LVOT area: 4.6 cm2  LA Volume (BP): 59.0 ml  LA Volume Index (BP): 29.4 ml/m2  RWT: 0.80        Doppler Measurements & Calculations  MV E max milton: 71.3 cm/sec  MV A max milton: 76.1 cm/sec  MV E/A: 0.94  MV dec time: 0.23 sec     Ao V2 max: 241.9 cm/sec  Ao max P.4 mmHg  Ao V2 mean: 139.4 cm/sec  Ao mean P.7 mmHg  Ao V2 VTI: 39.3 cm  ELAINA(I,D): 1.7 cm2  ELAINA(V,D): 1.4 cm2  LV V1 max P.0 mmHg  LV V1 max: 71.0 cm/sec  LV V1 VTI: 14.6 cm  SV(LVOT): 67.9 ml  SI(LVOT): 33.7 ml/m2  PA acc time: 0.11 sec  TR max milton: 313.7 cm/sec  TR max P.4 mmHg  AV Milton Ratio (DI): 0.29  ELAINA Index (cm2/m2): 0.86  E/E' av.0  Lateral E/e': 8.3  Medial E/e': 9.7              _____________________________________________________________________________  __        Report approved by: Willow Maya 2021 12:08 PM          Discharge Medications   Current Discharge Medication List      START taking these  medications    Details   aspirin (ASA) 81 MG EC tablet Take 1 tablet (81 mg) by mouth daily  Qty: 30 tablet, Refills: 0    Associated Diagnoses: Atherosclerosis of native artery of right lower extremity with gangrene (H)      carvedilol (COREG) 6.25 MG tablet Take 0.5 tablets (3.125 mg) by mouth 2 times daily (with meals)  Qty: 30 tablet, Refills: 0    Associated Diagnoses: Acute decompensated heart failure (H)      furosemide (LASIX) 40 MG tablet Take 1 tablet (40 mg) by mouth every morning  Qty: 30 tablet, Refills: 0    Associated Diagnoses: Acute decompensated heart failure (H)         CONTINUE these medications which have CHANGED    Details   amoxicillin-clavulanate (AUGMENTIN) 875-125 MG tablet Take 1 tablet by mouth 2 times daily  Qty: 14 tablet, Refills: 0    Associated Diagnoses: Gangrene of toe of right foot (H)         CONTINUE these medications which have NOT CHANGED    Details   acetaminophen (TYLENOL) 500 MG tablet Take 500 mg by mouth 3 times daily as needed      clopidogrel (PLAVIX) 75 MG tablet Take 75 mg by mouth daily       ergocalciferol (ERGOCALCIFEROL) 1.25 MG (66944 UT) capsule Take 50,000 Units by mouth once a week Takes on Sundays.      fish oil-omega-3 fatty acids (FISH OIL) 1000 MG capsule Take 2 g by mouth daily.      NOVOLIN N RELION 100 UNIT/ML susp INJECT 25 UNITS SUBCUTANEOUSLY IN THE MORNING AND 20 UNITS IN THE EVENING      NOVOLIN R RELION 100 UNIT/ML soln INJECT 3 UNITS SUBCUTANEOUSLY TWICE DAILY BEFORE MEAL(S)      Simvastatin (ZOCOR PO) Take 40 mg by mouth At Bedtime       tamsulosin (FLOMAX) 0.4 MG capsule Take 0.4 mg by mouth daily          STOP taking these medications       losartan (COZAAR) 50 MG tablet Comments:   Reason for Stopping:         metoprolol tartrate (LOPRESSOR) 25 MG tablet Comments:   Reason for Stopping:             Allergies   Allergies   Allergen Reactions     Atorvastatin Other (See Comments)     Body aches     Hmg-Coa-R Inhibitors      cough     Iodine       nausea     Lisinopril Other (See Comments)     Cough

## 2021-02-18 NOTE — PLAN OF CARE
Pt had a restful night.  Lung sounds are CTA/diminished in the bases, he remains on RA maintaining his sat's >91%.  No complaints of pain, he uses the call light appropriately, at night he uses the urinal in bed.  Will continue to monitor close for changes.

## 2022-01-01 ENCOUNTER — TRANSITIONAL CARE UNIT VISIT (OUTPATIENT)
Dept: GERIATRICS | Facility: CLINIC | Age: 87
End: 2022-01-01
Payer: COMMERCIAL

## 2022-01-01 ENCOUNTER — OFFICE VISIT (OUTPATIENT)
Dept: GERIATRICS | Facility: CLINIC | Age: 87
End: 2022-01-01
Payer: COMMERCIAL

## 2022-01-01 ENCOUNTER — DOCUMENTATION ONLY (OUTPATIENT)
Dept: OTHER | Facility: CLINIC | Age: 87
End: 2022-01-01

## 2022-01-01 ENCOUNTER — DOCUMENTATION ONLY (OUTPATIENT)
Dept: GERIATRICS | Facility: CLINIC | Age: 87
End: 2022-01-01
Payer: COMMERCIAL

## 2022-01-01 ENCOUNTER — LAB REQUISITION (OUTPATIENT)
Dept: LAB | Facility: CLINIC | Age: 87
End: 2022-01-01
Payer: COMMERCIAL

## 2022-01-01 ENCOUNTER — TRANSITIONAL CARE UNIT VISIT (OUTPATIENT)
Dept: GERIATRICS | Facility: CLINIC | Age: 87
End: 2022-01-01

## 2022-01-01 ENCOUNTER — TELEPHONE (OUTPATIENT)
Dept: GERIATRICS | Facility: CLINIC | Age: 87
End: 2022-01-01
Payer: COMMERCIAL

## 2022-01-01 ENCOUNTER — HEALTH MAINTENANCE LETTER (OUTPATIENT)
Age: 87
End: 2022-01-01

## 2022-01-01 VITALS
HEART RATE: 74 BPM | OXYGEN SATURATION: 100 % | SYSTOLIC BLOOD PRESSURE: 117 MMHG | BODY MASS INDEX: 30.35 KG/M2 | TEMPERATURE: 98.2 F | WEIGHT: 193.8 LBS | RESPIRATION RATE: 20 BRPM | DIASTOLIC BLOOD PRESSURE: 59 MMHG

## 2022-01-01 VITALS
RESPIRATION RATE: 28 BRPM | HEIGHT: 67 IN | SYSTOLIC BLOOD PRESSURE: 111 MMHG | BODY MASS INDEX: 30.35 KG/M2 | DIASTOLIC BLOOD PRESSURE: 66 MMHG | OXYGEN SATURATION: 97 % | TEMPERATURE: 96.8 F | WEIGHT: 193.4 LBS | HEART RATE: 87 BPM

## 2022-01-01 VITALS
OXYGEN SATURATION: 97 % | RESPIRATION RATE: 18 BRPM | HEART RATE: 74 BPM | BODY MASS INDEX: 30.35 KG/M2 | TEMPERATURE: 96 F | HEIGHT: 67 IN | SYSTOLIC BLOOD PRESSURE: 111 MMHG | WEIGHT: 193.4 LBS | DIASTOLIC BLOOD PRESSURE: 72 MMHG

## 2022-01-01 VITALS
TEMPERATURE: 96.7 F | BODY MASS INDEX: 29.49 KG/M2 | HEIGHT: 67 IN | WEIGHT: 187.9 LBS | HEART RATE: 82 BPM | RESPIRATION RATE: 18 BRPM | OXYGEN SATURATION: 94 % | SYSTOLIC BLOOD PRESSURE: 107 MMHG | DIASTOLIC BLOOD PRESSURE: 67 MMHG

## 2022-01-01 VITALS
TEMPERATURE: 96.7 F | HEART RATE: 95 BPM | SYSTOLIC BLOOD PRESSURE: 95 MMHG | WEIGHT: 193.4 LBS | HEIGHT: 67 IN | RESPIRATION RATE: 18 BRPM | BODY MASS INDEX: 30.35 KG/M2 | DIASTOLIC BLOOD PRESSURE: 62 MMHG | OXYGEN SATURATION: 96 %

## 2022-01-01 VITALS
TEMPERATURE: 96.1 F | WEIGHT: 200 LBS | SYSTOLIC BLOOD PRESSURE: 115 MMHG | DIASTOLIC BLOOD PRESSURE: 72 MMHG | OXYGEN SATURATION: 98 % | BODY MASS INDEX: 31.39 KG/M2 | HEIGHT: 67 IN | RESPIRATION RATE: 18 BRPM | HEART RATE: 52 BPM

## 2022-01-01 VITALS
HEIGHT: 67 IN | TEMPERATURE: 97 F | BODY MASS INDEX: 28.12 KG/M2 | DIASTOLIC BLOOD PRESSURE: 75 MMHG | RESPIRATION RATE: 22 BRPM | OXYGEN SATURATION: 100 % | SYSTOLIC BLOOD PRESSURE: 133 MMHG | WEIGHT: 179.2 LBS | HEART RATE: 110 BPM

## 2022-01-01 VITALS
WEIGHT: 193.8 LBS | RESPIRATION RATE: 16 BRPM | HEIGHT: 67 IN | BODY MASS INDEX: 30.42 KG/M2 | DIASTOLIC BLOOD PRESSURE: 72 MMHG | SYSTOLIC BLOOD PRESSURE: 120 MMHG | HEART RATE: 58 BPM | TEMPERATURE: 97.3 F | OXYGEN SATURATION: 95 %

## 2022-01-01 VITALS
BODY MASS INDEX: 30.35 KG/M2 | WEIGHT: 193.4 LBS | DIASTOLIC BLOOD PRESSURE: 62 MMHG | OXYGEN SATURATION: 96 % | RESPIRATION RATE: 16 BRPM | HEART RATE: 65 BPM | SYSTOLIC BLOOD PRESSURE: 100 MMHG | TEMPERATURE: 97.1 F | HEIGHT: 67 IN

## 2022-01-01 VITALS
OXYGEN SATURATION: 100 % | HEART RATE: 93 BPM | RESPIRATION RATE: 20 BRPM | HEIGHT: 67 IN | WEIGHT: 184 LBS | BODY MASS INDEX: 28.88 KG/M2 | TEMPERATURE: 97 F | DIASTOLIC BLOOD PRESSURE: 73 MMHG | SYSTOLIC BLOOD PRESSURE: 121 MMHG

## 2022-01-01 VITALS
HEART RATE: 96 BPM | SYSTOLIC BLOOD PRESSURE: 136 MMHG | RESPIRATION RATE: 18 BRPM | DIASTOLIC BLOOD PRESSURE: 83 MMHG | TEMPERATURE: 97.1 F | WEIGHT: 177 LBS | BODY MASS INDEX: 27.78 KG/M2 | HEIGHT: 67 IN | OXYGEN SATURATION: 98 %

## 2022-01-01 VITALS
WEIGHT: 198.8 LBS | TEMPERATURE: 97.5 F | SYSTOLIC BLOOD PRESSURE: 118 MMHG | RESPIRATION RATE: 20 BRPM | BODY MASS INDEX: 31.2 KG/M2 | HEART RATE: 99 BPM | DIASTOLIC BLOOD PRESSURE: 68 MMHG | HEIGHT: 67 IN | OXYGEN SATURATION: 99 %

## 2022-01-01 VITALS
OXYGEN SATURATION: 95 % | HEART RATE: 86 BPM | DIASTOLIC BLOOD PRESSURE: 73 MMHG | SYSTOLIC BLOOD PRESSURE: 122 MMHG | RESPIRATION RATE: 20 BRPM | WEIGHT: 179.2 LBS | TEMPERATURE: 97.5 F | BODY MASS INDEX: 28.12 KG/M2 | HEIGHT: 67 IN

## 2022-01-01 DIAGNOSIS — I50.23 ACUTE ON CHRONIC SYSTOLIC CONGESTIVE HEART FAILURE (H): ICD-10-CM

## 2022-01-01 DIAGNOSIS — I73.9 PVD (PERIPHERAL VASCULAR DISEASE) (H): ICD-10-CM

## 2022-01-01 DIAGNOSIS — Z79.4 TYPE 2 DIABETES MELLITUS WITH DIABETIC PERIPHERAL ANGIOPATHY AND GANGRENE, WITH LONG-TERM CURRENT USE OF INSULIN (H): ICD-10-CM

## 2022-01-01 DIAGNOSIS — M00.9 PYOGENIC ARTHRITIS, UNSPECIFIED (H): ICD-10-CM

## 2022-01-01 DIAGNOSIS — M62.89 MUSCLE STIFFNESS: ICD-10-CM

## 2022-01-01 DIAGNOSIS — G47.33 OSA (OBSTRUCTIVE SLEEP APNEA): ICD-10-CM

## 2022-01-01 DIAGNOSIS — Z76.89 POOR DENTITION REQUIRING REFERRAL TO DENTISTRY: ICD-10-CM

## 2022-01-01 DIAGNOSIS — I35.0 AORTIC VALVE STENOSIS, ETIOLOGY OF CARDIAC VALVE DISEASE UNSPECIFIED: ICD-10-CM

## 2022-01-01 DIAGNOSIS — E11.52 TYPE 2 DIABETES MELLITUS WITH DIABETIC PERIPHERAL ANGIOPATHY AND GANGRENE, WITH LONG-TERM CURRENT USE OF INSULIN (H): ICD-10-CM

## 2022-01-01 DIAGNOSIS — Z95.0 HX OF CARDIAC PACEMAKER: ICD-10-CM

## 2022-01-01 DIAGNOSIS — R52 PAIN: Primary | ICD-10-CM

## 2022-01-01 DIAGNOSIS — I25.10 CORONARY ARTERY DISEASE INVOLVING NATIVE CORONARY ARTERY OF NATIVE HEART WITHOUT ANGINA PECTORIS: ICD-10-CM

## 2022-01-01 DIAGNOSIS — H40.9 UNSPECIFIED GLAUCOMA: ICD-10-CM

## 2022-01-01 DIAGNOSIS — Z95.0 PRESENCE OF CARDIAC PACEMAKER: ICD-10-CM

## 2022-01-01 DIAGNOSIS — I50.31 ACUTE DIASTOLIC CONGESTIVE HEART FAILURE (H): Primary | ICD-10-CM

## 2022-01-01 DIAGNOSIS — J69.0 ASPIRATION PNEUMONIA OF LEFT LOWER LOBE, UNSPECIFIED ASPIRATION PNEUMONIA TYPE (H): ICD-10-CM

## 2022-01-01 DIAGNOSIS — Z76.89 POOR DENTITION REQUIRING REFERRAL TO DENTISTRY: Primary | ICD-10-CM

## 2022-01-01 DIAGNOSIS — M00.9 PYOGENIC ARTHRITIS OF LEFT KNEE JOINT, DUE TO UNSPECIFIED ORGANISM (H): Primary | ICD-10-CM

## 2022-01-01 DIAGNOSIS — M00.9 PYOGENIC ARTHRITIS OF LEFT KNEE JOINT, DUE TO UNSPECIFIED ORGANISM (H): ICD-10-CM

## 2022-01-01 DIAGNOSIS — R52 PAIN, UNSPECIFIED: ICD-10-CM

## 2022-01-01 DIAGNOSIS — G47.33 OBSTRUCTIVE SLEEP APNEA (ADULT) (PEDIATRIC): ICD-10-CM

## 2022-01-01 DIAGNOSIS — N39.490 OVERFLOW INCONTINENCE: ICD-10-CM

## 2022-01-01 DIAGNOSIS — M11.862: ICD-10-CM

## 2022-01-01 DIAGNOSIS — R52 PAIN: ICD-10-CM

## 2022-01-01 DIAGNOSIS — I50.31 ACUTE DIASTOLIC CONGESTIVE HEART FAILURE (H): ICD-10-CM

## 2022-01-01 DIAGNOSIS — E87.70 FLUID OVERLOAD, UNSPECIFIED: ICD-10-CM

## 2022-01-01 DIAGNOSIS — I10 ESSENTIAL (PRIMARY) HYPERTENSION: ICD-10-CM

## 2022-01-01 DIAGNOSIS — M11.869 CALCIUM PYROPHOSPHATE DEPOSITION DISEASE (CPDD) OF KNEE: ICD-10-CM

## 2022-01-01 DIAGNOSIS — Z79.4 LONG TERM (CURRENT) USE OF INSULIN (H): ICD-10-CM

## 2022-01-01 LAB
ALBUMIN SERPL-MCNC: 1.9 G/DL (ref 3.5–5)
ALP SERPL-CCNC: 59 U/L (ref 45–120)
ALT SERPL W P-5'-P-CCNC: <9 U/L (ref 0–45)
ANION GAP SERPL CALCULATED.3IONS-SCNC: 10 MMOL/L (ref 5–18)
ANION GAP SERPL CALCULATED.3IONS-SCNC: 10 MMOL/L (ref 5–18)
ANION GAP SERPL CALCULATED.3IONS-SCNC: 11 MMOL/L (ref 5–18)
ANION GAP SERPL CALCULATED.3IONS-SCNC: 12 MMOL/L (ref 5–18)
AST SERPL W P-5'-P-CCNC: 12 U/L (ref 0–40)
AST SERPL W P-5'-P-CCNC: 14 U/L (ref 0–40)
AST SERPL W P-5'-P-CCNC: 14 U/L (ref 0–40)
AST SERPL W P-5'-P-CCNC: 8 U/L (ref 0–40)
BASOPHILS # BLD AUTO: 0.1 10E3/UL (ref 0–0.2)
BASOPHILS NFR BLD AUTO: 1 %
BILIRUB SERPL-MCNC: 0.5 MG/DL (ref 0–1)
BNP SERPL-MCNC: 1033 PG/ML (ref 0–93)
BNP SERPL-MCNC: 1196 PG/ML (ref 0–93)
BUN SERPL-MCNC: 12 MG/DL (ref 8–28)
BUN SERPL-MCNC: 13 MG/DL (ref 8–28)
BUN SERPL-MCNC: 22 MG/DL (ref 8–28)
BUN SERPL-MCNC: 23 MG/DL (ref 8–28)
C REACTIVE PROTEIN LHE: 0.8 MG/DL (ref 0–0.8)
C REACTIVE PROTEIN LHE: 1.5 MG/DL (ref 0–0.8)
CALCIUM SERPL-MCNC: 8.1 MG/DL (ref 8.5–10.5)
CALCIUM SERPL-MCNC: 8.7 MG/DL (ref 8.5–10.5)
CALCIUM SERPL-MCNC: 8.8 MG/DL (ref 8.5–10.5)
CALCIUM SERPL-MCNC: 8.9 MG/DL (ref 8.5–10.5)
CHLORIDE BLD-SCNC: 94 MMOL/L (ref 98–107)
CHLORIDE BLD-SCNC: 95 MMOL/L (ref 98–107)
CHLORIDE BLD-SCNC: 99 MMOL/L (ref 98–107)
CHLORIDE BLD-SCNC: 99 MMOL/L (ref 98–107)
CO2 SERPL-SCNC: 21 MMOL/L (ref 22–31)
CO2 SERPL-SCNC: 25 MMOL/L (ref 22–31)
CO2 SERPL-SCNC: 26 MMOL/L (ref 22–31)
CO2 SERPL-SCNC: 32 MMOL/L (ref 22–31)
CREAT SERPL-MCNC: 0.8 MG/DL (ref 0.7–1.3)
CREAT SERPL-MCNC: 0.85 MG/DL (ref 0.7–1.3)
CREAT SERPL-MCNC: 0.89 MG/DL (ref 0.7–1.3)
CREAT SERPL-MCNC: 0.89 MG/DL (ref 0.7–1.3)
CREAT SERPL-MCNC: 0.94 MG/DL (ref 0.7–1.3)
CREAT SERPL-MCNC: 0.97 MG/DL (ref 0.7–1.3)
CRP SERPL HS-MCNC: 105.2 MG/L (ref 0–3)
EOSINOPHIL # BLD AUTO: 0.2 10E3/UL (ref 0–0.7)
EOSINOPHIL # BLD AUTO: 0.3 10E3/UL (ref 0–0.7)
EOSINOPHIL # BLD AUTO: 0.3 10E3/UL (ref 0–0.7)
EOSINOPHIL NFR BLD AUTO: 2 %
EOSINOPHIL NFR BLD AUTO: 3 %
EOSINOPHIL NFR BLD AUTO: 3 %
ERYTHROCYTE [DISTWIDTH] IN BLOOD BY AUTOMATED COUNT: 13.2 % (ref 10–15)
ERYTHROCYTE [DISTWIDTH] IN BLOOD BY AUTOMATED COUNT: 13.6 % (ref 10–15)
ERYTHROCYTE [DISTWIDTH] IN BLOOD BY AUTOMATED COUNT: 14.3 % (ref 10–15)
ERYTHROCYTE [DISTWIDTH] IN BLOOD BY AUTOMATED COUNT: 14.3 % (ref 10–15)
ERYTHROCYTE [DISTWIDTH] IN BLOOD BY AUTOMATED COUNT: 14.4 % (ref 10–15)
ERYTHROCYTE [SEDIMENTATION RATE] IN BLOOD BY WESTERGREN METHOD: 28 MM/HR (ref 0–15)
ERYTHROCYTE [SEDIMENTATION RATE] IN BLOOD BY WESTERGREN METHOD: 8 MM/HR (ref 0–15)
GFR SERPL CREATININE-BSD FRML MDRD: 74 ML/MIN/1.73M2
GFR SERPL CREATININE-BSD FRML MDRD: 77 ML/MIN/1.73M2
GFR SERPL CREATININE-BSD FRML MDRD: 81 ML/MIN/1.73M2
GFR SERPL CREATININE-BSD FRML MDRD: 81 ML/MIN/1.73M2
GFR SERPL CREATININE-BSD FRML MDRD: 83 ML/MIN/1.73M2
GFR SERPL CREATININE-BSD FRML MDRD: 84 ML/MIN/1.73M2
GLUCOSE BLD-MCNC: 132 MG/DL (ref 70–125)
GLUCOSE BLD-MCNC: 174 MG/DL (ref 70–125)
GLUCOSE BLD-MCNC: 199 MG/DL (ref 70–125)
GLUCOSE BLD-MCNC: 60 MG/DL (ref 70–125)
HCT VFR BLD AUTO: 32.8 % (ref 40–53)
HCT VFR BLD AUTO: 33.8 % (ref 40–53)
HCT VFR BLD AUTO: 33.9 % (ref 40–53)
HCT VFR BLD AUTO: 35.1 % (ref 40–53)
HCT VFR BLD AUTO: 35.7 % (ref 40–53)
HGB BLD-MCNC: 10.5 G/DL (ref 13.3–17.7)
HGB BLD-MCNC: 10.8 G/DL (ref 13.3–17.7)
HGB BLD-MCNC: 10.9 G/DL (ref 13.3–17.7)
HGB BLD-MCNC: 11.2 G/DL (ref 13.3–17.7)
HGB BLD-MCNC: 11.3 G/DL (ref 13.3–17.7)
IMM GRANULOCYTES # BLD: 0.1 10E3/UL
IMM GRANULOCYTES # BLD: 0.1 10E3/UL
IMM GRANULOCYTES # BLD: 0.2 10E3/UL
IMM GRANULOCYTES NFR BLD: 1 %
IMM GRANULOCYTES NFR BLD: 1 %
IMM GRANULOCYTES NFR BLD: 2 %
LYMPHOCYTES # BLD AUTO: 1.4 10E3/UL (ref 0.8–5.3)
LYMPHOCYTES # BLD AUTO: 1.5 10E3/UL (ref 0.8–5.3)
LYMPHOCYTES # BLD AUTO: 1.7 10E3/UL (ref 0.8–5.3)
LYMPHOCYTES NFR BLD AUTO: 13 %
LYMPHOCYTES NFR BLD AUTO: 18 %
LYMPHOCYTES NFR BLD AUTO: 21 %
MCH RBC QN AUTO: 27.7 PG (ref 26.5–33)
MCH RBC QN AUTO: 28.4 PG (ref 26.5–33)
MCH RBC QN AUTO: 28.5 PG (ref 26.5–33)
MCH RBC QN AUTO: 29 PG (ref 26.5–33)
MCH RBC QN AUTO: 29.3 PG (ref 26.5–33)
MCHC RBC AUTO-ENTMCNC: 31.7 G/DL (ref 31.5–36.5)
MCHC RBC AUTO-ENTMCNC: 31.9 G/DL (ref 31.5–36.5)
MCHC RBC AUTO-ENTMCNC: 32 G/DL (ref 31.5–36.5)
MCHC RBC AUTO-ENTMCNC: 32 G/DL (ref 31.5–36.5)
MCHC RBC AUTO-ENTMCNC: 32.2 G/DL (ref 31.5–36.5)
MCV RBC AUTO: 86 FL (ref 78–100)
MCV RBC AUTO: 89 FL (ref 78–100)
MCV RBC AUTO: 90 FL (ref 78–100)
MCV RBC AUTO: 91 FL (ref 78–100)
MCV RBC AUTO: 92 FL (ref 78–100)
MONOCYTES # BLD AUTO: 0.9 10E3/UL (ref 0–1.3)
MONOCYTES # BLD AUTO: 1 10E3/UL (ref 0–1.3)
MONOCYTES # BLD AUTO: 1 10E3/UL (ref 0–1.3)
MONOCYTES NFR BLD AUTO: 10 %
MONOCYTES NFR BLD AUTO: 10 %
MONOCYTES NFR BLD AUTO: 12 %
NEUTROPHILS # BLD AUTO: 5.2 10E3/UL (ref 1.6–8.3)
NEUTROPHILS # BLD AUTO: 5.3 10E3/UL (ref 1.6–8.3)
NEUTROPHILS # BLD AUTO: 7.7 10E3/UL (ref 1.6–8.3)
NEUTROPHILS NFR BLD AUTO: 64 %
NEUTROPHILS NFR BLD AUTO: 65 %
NEUTROPHILS NFR BLD AUTO: 72 %
NRBC # BLD AUTO: 0 10E3/UL
NRBC BLD AUTO-RTO: 0 /100
PLATELET # BLD AUTO: 282 10E3/UL (ref 150–450)
PLATELET # BLD AUTO: 317 10E3/UL (ref 150–450)
PLATELET # BLD AUTO: 328 10E3/UL (ref 150–450)
PLATELET # BLD AUTO: 384 10E3/UL (ref 150–450)
PLATELET # BLD AUTO: 405 10E3/UL (ref 150–450)
POTASSIUM BLD-SCNC: 3.6 MMOL/L (ref 3.5–5)
POTASSIUM BLD-SCNC: 4.1 MMOL/L (ref 3.5–5)
POTASSIUM BLD-SCNC: 4.6 MMOL/L (ref 3.5–5)
POTASSIUM BLD-SCNC: 4.8 MMOL/L (ref 3.5–5)
PROT SERPL-MCNC: 5.6 G/DL (ref 6–8)
RBC # BLD AUTO: 3.68 10E6/UL (ref 4.4–5.9)
RBC # BLD AUTO: 3.68 10E6/UL (ref 4.4–5.9)
RBC # BLD AUTO: 3.86 10E6/UL (ref 4.4–5.9)
RBC # BLD AUTO: 3.93 10E6/UL (ref 4.4–5.9)
RBC # BLD AUTO: 3.98 10E6/UL (ref 4.4–5.9)
SODIUM SERPL-SCNC: 129 MMOL/L (ref 136–145)
SODIUM SERPL-SCNC: 132 MMOL/L (ref 136–145)
SODIUM SERPL-SCNC: 135 MMOL/L (ref 136–145)
SODIUM SERPL-SCNC: 138 MMOL/L (ref 136–145)
WBC # BLD AUTO: 10.5 10E3/UL (ref 4–11)
WBC # BLD AUTO: 8 10E3/UL (ref 4–11)
WBC # BLD AUTO: 8.2 10E3/UL (ref 4–11)
WBC # BLD AUTO: 8.3 10E3/UL (ref 4–11)
WBC # BLD AUTO: 9 10E3/UL (ref 4–11)

## 2022-01-01 PROCEDURE — 85652 RBC SED RATE AUTOMATED: CPT | Mod: ORL | Performed by: NURSE PRACTITIONER

## 2022-01-01 PROCEDURE — 84450 TRANSFERASE (AST) (SGOT): CPT | Mod: ORL | Performed by: FAMILY MEDICINE

## 2022-01-01 PROCEDURE — 86141 C-REACTIVE PROTEIN HS: CPT | Mod: ORL | Performed by: FAMILY MEDICINE

## 2022-01-01 PROCEDURE — 83880 ASSAY OF NATRIURETIC PEPTIDE: CPT | Mod: ORL | Performed by: NURSE PRACTITIONER

## 2022-01-01 PROCEDURE — 86140 C-REACTIVE PROTEIN: CPT | Mod: ORL | Performed by: NURSE PRACTITIONER

## 2022-01-01 PROCEDURE — P9604 ONE-WAY ALLOW PRORATED TRIP: HCPCS | Mod: ORL | Performed by: FAMILY MEDICINE

## 2022-01-01 PROCEDURE — 85025 COMPLETE CBC W/AUTO DIFF WBC: CPT | Mod: ORL | Performed by: FAMILY MEDICINE

## 2022-01-01 PROCEDURE — 99310 SBSQ NF CARE HIGH MDM 45: CPT | Performed by: NURSE PRACTITIONER

## 2022-01-01 PROCEDURE — 82565 ASSAY OF CREATININE: CPT | Mod: ORL | Performed by: FAMILY MEDICINE

## 2022-01-01 PROCEDURE — 85025 COMPLETE CBC W/AUTO DIFF WBC: CPT | Mod: ORL | Performed by: NURSE PRACTITIONER

## 2022-01-01 PROCEDURE — 80048 BASIC METABOLIC PNL TOTAL CA: CPT | Mod: ORL | Performed by: NURSE PRACTITIONER

## 2022-01-01 PROCEDURE — 99304 1ST NF CARE SF/LOW MDM 25: CPT | Performed by: NURSE PRACTITIONER

## 2022-01-01 PROCEDURE — 36415 COLL VENOUS BLD VENIPUNCTURE: CPT | Performed by: FAMILY MEDICINE

## 2022-01-01 PROCEDURE — 84450 TRANSFERASE (AST) (SGOT): CPT | Performed by: FAMILY MEDICINE

## 2022-01-01 PROCEDURE — 99316 NF DSCHRG MGMT 30 MIN+: CPT | Performed by: NURSE PRACTITIONER

## 2022-01-01 PROCEDURE — 36415 COLL VENOUS BLD VENIPUNCTURE: CPT | Mod: ORL | Performed by: FAMILY MEDICINE

## 2022-01-01 PROCEDURE — 99305 1ST NF CARE MODERATE MDM 35: CPT | Performed by: NURSE PRACTITIONER

## 2022-01-01 PROCEDURE — 80053 COMPREHEN METABOLIC PANEL: CPT | Performed by: FAMILY MEDICINE

## 2022-01-01 PROCEDURE — 85027 COMPLETE CBC AUTOMATED: CPT | Mod: ORL | Performed by: NURSE PRACTITIONER

## 2022-01-01 PROCEDURE — P9603 ONE-WAY ALLOW PRORATED MILES: HCPCS | Performed by: FAMILY MEDICINE

## 2022-01-01 PROCEDURE — 99309 SBSQ NF CARE MODERATE MDM 30: CPT | Performed by: NURSE PRACTITIONER

## 2022-01-01 PROCEDURE — 84450 TRANSFERASE (AST) (SGOT): CPT | Mod: ORL,91 | Performed by: FAMILY MEDICINE

## 2022-01-01 PROCEDURE — 80048 BASIC METABOLIC PNL TOTAL CA: CPT | Performed by: FAMILY MEDICINE

## 2022-01-01 PROCEDURE — P9604 ONE-WAY ALLOW PRORATED TRIP: HCPCS | Mod: ORL | Performed by: NURSE PRACTITIONER

## 2022-01-01 RX ORDER — DOXYCYCLINE 100 MG/1
100 CAPSULE ORAL
COMMUNITY
End: 2022-01-01

## 2022-01-01 RX ORDER — DOXYCYCLINE 100 MG/1
100 CAPSULE ORAL 2 TIMES DAILY
COMMUNITY
Start: 2022-01-01 | End: 2022-01-01

## 2022-01-01 RX ORDER — AMOXICILLIN 250 MG
2 CAPSULE ORAL 2 TIMES DAILY
COMMUNITY

## 2022-01-01 RX ORDER — CEFTRIAXONE SODIUM 2 G
2 VIAL (EA) INJECTION EVERY 24 HOURS
COMMUNITY
End: 2022-01-01

## 2022-01-01 RX ORDER — HYDROMORPHONE HYDROCHLORIDE 2 MG/1
1 TABLET ORAL EVERY 6 HOURS PRN
COMMUNITY
End: 2022-01-01

## 2022-01-01 RX ORDER — METHOCARBAMOL 500 MG/1
250 TABLET, FILM COATED ORAL DAILY
COMMUNITY

## 2022-01-01 RX ORDER — AMOXICILLIN 500 MG/1
500 CAPSULE ORAL 3 TIMES DAILY
COMMUNITY

## 2022-01-01 RX ORDER — HYDROMORPHONE HYDROCHLORIDE 2 MG/1
1 TABLET ORAL EVERY 6 HOURS PRN
Qty: 20 TABLET | Refills: 0 | Status: SHIPPED | OUTPATIENT
Start: 2022-01-01 | End: 2022-01-01

## 2022-01-01 RX ORDER — INSULIN HUMAN 100 [IU]/ML
2 INJECTION, SOLUTION PARENTERAL 2 TIMES DAILY
COMMUNITY

## 2022-01-01 RX ORDER — FUROSEMIDE 20 MG
40 TABLET ORAL 2 TIMES DAILY
COMMUNITY

## 2022-03-22 PROBLEM — I70.229 CRITICAL LOWER LIMB ISCHEMIA (H): Chronic | Status: ACTIVE | Noted: 2021-02-04

## 2022-03-22 PROBLEM — Z89.419 HISTORY OF AMPUTATION OF GREAT TOE (H): Status: ACTIVE | Noted: 2022-01-01

## 2022-03-22 PROBLEM — T81.718A PSEUDOANEURYSM OF FEMORAL ARTERY FOLLOWING PROCEDURE (H): Status: ACTIVE | Noted: 2021-02-10

## 2022-03-22 PROBLEM — R80.9 MICROALBUMINURIA DUE TO TYPE 2 DIABETES MELLITUS (H): Status: ACTIVE | Noted: 2022-01-01

## 2022-03-22 PROBLEM — Z95.0 HISTORY OF PLACEMENT OF LEADLESS CARDIAC PACEMAKER: Status: ACTIVE | Noted: 2022-01-01

## 2022-03-22 PROBLEM — I44.1 MOBITZ (TYPE) I (WENCKEBACH'S) ATRIOVENTRICULAR BLOCK: Status: ACTIVE | Noted: 2021-05-10

## 2022-03-22 PROBLEM — N39.490 OVERFLOW INCONTINENCE OF URINE: Status: ACTIVE | Noted: 2018-09-14

## 2022-03-22 PROBLEM — M11.862: Status: ACTIVE | Noted: 2022-01-01

## 2022-03-22 PROBLEM — E11.29 MICROALBUMINURIA DUE TO TYPE 2 DIABETES MELLITUS (H): Status: ACTIVE | Noted: 2022-01-01

## 2022-03-22 PROBLEM — M00.9 PYOGENIC ARTHRITIS OF LEFT KNEE JOINT (H): Status: ACTIVE | Noted: 2022-01-01

## 2022-03-22 PROBLEM — I72.4 PSEUDOANEURYSM OF FEMORAL ARTERY FOLLOWING PROCEDURE (H): Status: ACTIVE | Noted: 2021-02-10

## 2022-03-22 PROBLEM — I96 GANGRENE OF TOE OF RIGHT FOOT (H): Chronic | Status: ACTIVE | Noted: 2021-02-10

## 2022-03-22 PROBLEM — E55.9 VITAMIN D DEFICIENCY: Status: ACTIVE | Noted: 2022-01-01

## 2022-03-22 PROBLEM — R05.9 COUGH: Status: ACTIVE | Noted: 2022-01-01

## 2022-03-22 NOTE — PROGRESS NOTES
[facility] GERIATRIC SERVICES    Code Status:  FULL CODE   Visit Type:   Chief Complaint   Patient presents with     Hospital F/U     TCU Admission     Facility:  [facility] ([facility]) [[id number]]           History of Present Illness: Jhoan Curran is a 89 year old male who I am seeing today for admit to the TCU.  Patient recently hospitalized at [facility] on 3/12/2022.  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  Patient with a history of 3-day left knee pain with increased swelling and decreased range of motion.  Patient was unable to bear weight.  He underwent arthrocentesis which showed gram-positive cocci.  CRP and lactate were also both elevated.  Eliquis and Plavix held for his I&D.  Patient had a blood culture on 3/13 that became positive for gram-positive cocci.  Vascular surgeon and ID were consulted.  Patient started on vancomycin on 3/13 and underwent I&D of the left knee on 3/13.  On 3/17 patient complained of increased pain in the left lower extremity just above the knee to his upper thigh.  He had a venous Doppler ultrasound which was negative for DVT.  Patient high risk for endocarditis with a history of aortic stenosis.  Patient underwent VEE on 3/16.  Patient had repetitive coughing but no valvular vegetations were seen.  Patient was seen by speech therapy which recommended an esophagram.  There was concern for esophageal stricture being the cause of his dysphagia.  Exam showed only esophageal dysmotility.  Patient does have a multiple areas of tooth decay and infection and will need tooth extraction in the near future.  According to the hospital records the patient's son is making arrangements for this.      On today's visit  patient lying in bed.  He continues with dressing to his left knee. No strikethrough.  He does have some swelling noted.  No redness or warmth.  Patient is afebrile.  He continues on ceftriaxone 2 g daily.  He is receiving weekly labs.  Recent CRP is 105.2.  Hemoglobin 10.8.  Albumin 1.9.  Sodium slightly low at 135.  Patient with underlying diabetes mellitus type 2.  He does continue on insulin.  Blood sugars are in the 2-4 100s.  Patient denies any further dysphagia.  He does report difficulty chewing secondary to his poor dentition.  Diet was downgraded today.  Coronary artery disease on Coreg.  Peripheral vascular disease on Plavix and statin as well as aspirin.  New onset atrial fib on apixaban 2 times daily.  He has not been receiving his losartan.  I did look back in the records and it showed this was discontinued back in September 2021 during that hospitalization.  According to his orders it says to follow-up with the primary care physician when that should be taken.  Overflow incontinence on Flomax.  Patient tolerating IV.  No diarrhea.  No shortness of breath.  No chest pain.    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) 05/03/2007     Gangrene of toe of right foot (H) 02/10/2021     Critical lower limb ischemia (H) 02/04/2021     Right bundle branch block 05/03/2007     Acute respiratory failure with hypoxia and hypercapnia  (H) 02/15/2021     Pulmonary edema 02/15/2021     Cough 03/22/2022     Calculus of kidney 05/03/2007     Calcium pyrophosphate deposition disease (CPDD) of left knee 03/14/2022     Benign neoplasm of colon 05/03/2007     Aortic stenosis 05/03/2007     Neurogenic claudication (H) 06/23/2015     Microalbuminuria due to type 2 diabetes mellitus (H) 02/09/2022     Mobitz (type) I (Wenckebach's) atrioventricular block 05/10/2021     Hyperlipidemia associated with type 2 diabetes mellitus (H) 05/03/2007     History of placement of leadless cardiac pacemaker 03/12/2022     History of amputation of great toe (H) 02/08/2022     Glaucoma 11/23/2007     Gastroesophageal reflux disease without esophagitis 11/23/2007     SILVANO (obstructive sleep apnea) 08/25/2011     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) 02/10/2021     Pyogenic arthritis of left knee joint (H) 03/12/2022     Type 2 diabetes mellitus with diabetic peripheral angiopathy and gangrene, with long-term current use of insulin (H) 06/22/2015     Vitamin D deficiency 03/02/2022     Resolved Ambulatory Problems     Diagnosis Date Noted     No Resolved Ambulatory Problems     Past Medical History:   Diagnosis Date     Atherosclerosis of right lower extremity with gangrene (H)      HTN (hypertension)      Overflow incontinence      PAD (peripheral artery disease) (H)      RBBB        Current Outpatient Medications:      apixaban ANTICOAGULANT (ELIQUIS) 2.5 MG tablet, Take 5 mg by mouth 2 times daily, Disp: , Rfl:      cefTRIAXone (ROCEPHIN) 2 GM IV, Inject 2 g into the vein every 24 hours, Disp: , Rfl:      acetaminophen (TYLENOL) 500 MG tablet, Take 500 mg by mouth 3 times daily as needed, Disp: , Rfl:      aspirin (ASA) 81 MG EC tablet, Take 1 tablet (81 mg) by mouth daily, Disp: 30 tablet, Rfl: 0     carvedilol (COREG) 6.25 MG tablet, Take 0.5 tablets (3.125 mg) by mouth 2 times daily (with meals), Disp: 30 tablet, Rfl: 0     clopidogrel (PLAVIX) 75 MG  "tablet, Take 75 mg by mouth daily , Disp: , Rfl:      furosemide (LASIX) 40 MG tablet, Take 1 tablet (40 mg) by mouth every morning, Disp: 30 tablet, Rfl: 0     HYDROmorphone (DILAUDID) 2 MG tablet, Take 0.5 tablets (1 mg) by mouth every 6 hours as needed, Disp: 20 tablet, Rfl: 0     NOVOLIN N RELION 100 UNIT/ML susp, 28 Units , Disp: , Rfl:      NOVOLIN R RELION 100 UNIT/ML soln, INJECT 3 UNITS SUBCUTANEOUSLY TWICE DAILY BEFORE MEAL(S), Disp: , Rfl:      Simvastatin (ZOCOR PO), Take 40 mg by mouth At Bedtime , Disp: , Rfl:      tamsulosin (FLOMAX) 0.4 MG capsule, Take 0.4 mg by mouth daily , Disp: , Rfl:   Allergies   Allergen Reactions     Iodine      nausea     Statin Drugs [Hmg-Coa-R Inhibitors]      cough     Atorvastatin Other (See Comments) and Muscle Pain (Myalgia)     Body aches     Contrast Dye Nausea and Vomiting     Lisinopril Other (See Comments) and Cough     Cough         All Meds and Allergies reviewed in the record at the facility and is the most up-to-date    REVIEW OF SYSTEMS:   Review of Systems  No fevers or chills. No headache, lightheadedness or dizziness. No SOB, chest pains or palpitations. Appetite is good. No nausea, vomiting, constipation or diarrhea. No dysuria, frequency, burning or pain with urination.  Pain in his left knee controlled with Dilaudid patient denies dysphagia.  And Tylenol.  Poor dentition.  Otherwise review of systems are negative.     PHYSICAL EXAMINATION:  Physical Exam     Vital signs: /72   Pulse 58   Temp 97.3  F (36.3  C)   Resp 16   Ht 1.702 m (5' 7\")   Wt 87.9 kg (193 lb 12.8 oz)   SpO2 95%   BMI 30.35 kg/m    General: Awake, Alert, oriented x3,  follows simple commands  HEENT: Pink conjunctiva, anicteric sclerae, moist oral mucosa  NECK: Supple, without any lymphadenopathy, or masses  CVS:  S1  S2, without murmur or gallop.   LUNG: Clear to auscultation, No wheezes, rales or rhonci.  BACK: No kyphosis of the thoracic spine  ABDOMEN: Soft, nontender " to palpation, with positive bowel sounds  EXTREMITIES: Moves both upper and lower extremities, with some limitation of the left knee. Left knee with dressing in place.  No strikethrough.  He does have some swelling.  No redness or warmth.   SKIN: Warm and dry  NEUROLOGIC: Intact, pulses palpable  PSYCHIATRIC: Flat affect.      Labs:  All labs reviewed in the nursing home record and Epic   @  Lab Results   Component Value Date    WBC 10.5 03/21/2022    WBC 8.2 02/18/2021     Lab Results   Component Value Date    RBC 3.68 03/21/2022    RBC 3.96 02/18/2021     Lab Results   Component Value Date    HGB 10.8 03/21/2022    HGB 12.4 02/18/2021     Lab Results   Component Value Date    HCT 33.8 03/21/2022    HCT 36.8 02/18/2021     Lab Results   Component Value Date    MCV 92 03/21/2022    MCV 93 02/18/2021     Lab Results   Component Value Date    MCH 29.3 03/21/2022    MCH 31.3 02/18/2021     Lab Results   Component Value Date    MCHC 32.0 03/21/2022    MCHC 33.7 02/18/2021     Lab Results   Component Value Date    RDW 13.2 03/21/2022    RDW 14.0 02/18/2021     Lab Results   Component Value Date     03/21/2022     02/18/2021        @Last Comprehensive Metabolic Panel:  Sodium   Date Value Ref Range Status   03/21/2022 135 (L) 136 - 145 mmol/L Final   02/18/2021 138 133 - 144 mmol/L Final     Potassium   Date Value Ref Range Status   03/21/2022 4.6 3.5 - 5.0 mmol/L Final   02/18/2021 3.7 3.4 - 5.3 mmol/L Final     Chloride   Date Value Ref Range Status   03/21/2022 99 98 - 107 mmol/L Final   02/18/2021 101 94 - 109 mmol/L Final     Carbon Dioxide   Date Value Ref Range Status   02/18/2021 33 (H) 20 - 32 mmol/L Final     Carbon Dioxide (CO2)   Date Value Ref Range Status   03/21/2022 26 22 - 31 mmol/L Final     Anion Gap   Date Value Ref Range Status   03/21/2022 10 5 - 18 mmol/L Final   02/18/2021 4 3 - 14 mmol/L Final     Glucose   Date Value Ref Range Status   03/21/2022 199 (H) 70 - 125 mg/dL Final    02/18/2021 159 (H) 70 - 99 mg/dL Final     Urea Nitrogen   Date Value Ref Range Status   03/21/2022 13 8 - 28 mg/dL Final   02/18/2021 19 7 - 30 mg/dL Final     Creatinine   Date Value Ref Range Status   03/21/2022 0.85 0.70 - 1.30 mg/dL Final   02/18/2021 0.99 0.66 - 1.25 mg/dL Final     GFR Estimate   Date Value Ref Range Status   03/21/2022 83 >60 mL/min/1.73m2 Final     Comment:     Effective December 21, 2021 eGFRcr in adults is calculated using the 2021 CKD-EPI creatinine equation which includes age and gender (Dirk et al., NEJM, DOI: 10.1056/HMEQqt6866600)   02/18/2021 67 >60 mL/min/[1.73_m2] Final     Comment:     Non  GFR Calc  Starting 12/18/2018, serum creatinine based estimated GFR (eGFR) will be   calculated using the Chronic Kidney Disease Epidemiology Collaboration   (CKD-EPI) equation.       Calcium   Date Value Ref Range Status   03/21/2022 8.9 8.5 - 10.5 mg/dL Final   02/18/2021 8.8 8.5 - 10.1 mg/dL Final         Assessment/Plan:    ICD-10-CM    1. Pyogenic arthritis of left knee joint, due to unspecified organism (H)  M00.9 Continue on Rocephin Q 24 hours.   Continue labs weekly. . Hgb 10.8.   Pain controlled with tylenol and diluadid.    2. Type 2 diabetes mellitus with diabetic peripheral angiopathy and gangrene, with long-term current use of insulin (H)  E11.52 Blood sugars consistently elevated in the 200-300s.   Increase am NPH to 28 units. Continue NPH 20 units in evening. Also continue sliding scale with meals.     Z79.4    3. PVD (peripheral vascular disease) (H)  I73.9 Hx of toe amputation. Pt continues on plavix and statin.    4. Coronary artery disease involving native coronary artery of native heart without angina pectoris  I25.10 See above. No CP or SOB.   Blood pressure satisfactory.  It looks like his losartan was actually discontinued from a different hospitalization of September 2021.  I will DC this.  Blood pressure satisfactory.  Continue to monitor  every shift.   5. Hx of cardiac pacemaker  Z95.0 Continue on coreg for rate control.    6. Calcium pyrophosphate deposition disease (CPDD) of knee  M11.869 Continues on tylenol and Diluadid.      Okay for PT, OT and speech therapy to eval and treat.    This note has been dictated using voice recognition software. Any grammatical or context distortions are unintentional and inherent to the software    Electronically signed by: Barbara Stark, CNP

## 2022-03-24 NOTE — LETTER
3/24/2022        RE: Jhoan Curran  [address] [facility] [id number]        No notes on file      Sincerely,        Barbara Stark, NP

## 2022-03-25 NOTE — PROGRESS NOTES
[facility] GERIATRIC SERVICES    Code Status:  FULL CODE   Visit Type:   Chief Complaint   Patient presents with     Nursing Home Acute     TCU Follow up     Facility:  [facility] ([facility]) [[id number]]           History of Present Illness: Jhoan Curran is a 89 year old male who I am seeing today for for follow-up on the TCU.  Patient recently hospitalized at [facility] on 3/12/2022.  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  Patient with a history of 3-day left knee pain with increased swelling and decreased range of motion.  Patient was unable to bear weight.  He underwent arthrocentesis which showed gram-positive cocci.  CRP and lactate were also both elevated.  Eliquis and Plavix held for his I&D.  Patient had a blood culture on 3/13 that became positive for gram-positive cocci.  Vascular surgeon and ID were consulted.  Patient started on vancomycin on 3/13 and underwent I&D of the left knee on 3/13.  On 3/17 patient complained of increased pain in the left lower extremity just above the knee to his upper thigh.  He had a venous Doppler ultrasound which was negative for DVT.  Patient high risk for endocarditis with a history of aortic stenosis.  Patient underwent VEE on 3/16.  Patient had repetitive coughing but no valvular vegetations were seen.  Patient was seen by speech therapy which recommended an esophagram.  There was concern for esophageal stricture being the cause of his dysphagia.  Exam showed only esophageal dysmotility.  Patient does have a multiple areas of tooth decay and infection and will need tooth extraction in the near future.  According to the hospital records the patient's son is making arrangements for this.      On  today's visit patient lying in bed.  Patient with recent pyogenic arthritis of the left knee joint with I&D.  Continues on IV antibiotic therapy until 4/11/2022.  Today he is reporting some increased pain in the thigh and hip.  He is unable to tolerate 100% extension.  This is most likely causing some hamstring tightness as well as IT band tightness.  He continues on Tylenol and Dilaudid for pain.  Patient currently afebrile.  He does have some swelling in the left knee joint.  No redness or warmth.  He continues with weekly labs.  ID is following. Recent CRP is 105.2.  Hemoglobin 10.8.  Albumin 1.9.  Sodium slightly low at 135.  Patient with underlying diabetes mellitus type 2.  He does continue on insulin.  Recent increase in his insulin.  Blood sugar slightly improved.  Patient denies any further dysphagia.  He does report difficulty chewing secondary to his poor dentition.  Diet was downgraded today.  Coronary artery disease on Coreg.  Peripheral vascular disease on Plavix and statin as well as aspirin.  New onset atrial fib on apixaban 2 times daily.  Hypertension.  Blood pressure satisfactory.  Overflow incontinence on Flomax.  Obstructive sleep apnea on CPAP.    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) 05/03/2007     Gangrene of toe of right foot (H) 02/10/2021     Critical lower limb ischemia (H) 02/04/2021     Right bundle  branch block 05/03/2007     Acute respiratory failure with hypoxia and hypercapnia (H) 02/15/2021     Pulmonary edema 02/15/2021     Cough 03/22/2022     Calculus of kidney 05/03/2007     Calcium pyrophosphate deposition disease (CPDD) of left knee 03/14/2022     Benign neoplasm of colon 05/03/2007     Aortic stenosis 05/03/2007     Neurogenic claudication (H) 06/23/2015     Microalbuminuria due to type 2 diabetes mellitus (H) 02/09/2022     Mobitz (type) I (Wenckebach's) atrioventricular block 05/10/2021     Hyperlipidemia associated with type 2 diabetes mellitus (H) 05/03/2007     History of placement of leadless cardiac pacemaker 03/12/2022     History of amputation of great toe (H) 02/08/2022     Glaucoma 11/23/2007     Gastroesophageal reflux disease without esophagitis 11/23/2007     SILVANO (obstructive sleep apnea) 08/25/2011     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) 02/10/2021     Pyogenic arthritis of left knee joint (H) 03/12/2022     Type 2 diabetes mellitus with diabetic peripheral angiopathy and gangrene, with long-term current use of insulin (H) 06/22/2015     Vitamin D deficiency 03/02/2022     Resolved Ambulatory Problems     Diagnosis Date Noted     No Resolved Ambulatory Problems     Past Medical History:   Diagnosis Date     Atherosclerosis of right lower extremity with gangrene (H)      HTN (hypertension)      Overflow incontinence      PAD (peripheral artery disease) (H)      RBBB        Current Outpatient Medications:      acetaminophen (TYLENOL) 500 MG tablet, Take 1,000 mg by mouth 3 times daily , Disp: , Rfl:      apixaban ANTICOAGULANT (ELIQUIS) 2.5 MG tablet, Take 5 mg by mouth 2 times daily, Disp: , Rfl:      aspirin (ASA) 81 MG EC tablet, Take 1 tablet (81 mg) by mouth daily, Disp: 30 tablet, Rfl: 0     carvedilol (COREG) 6.25 MG tablet, Take 0.5 tablets (3.125 mg) by mouth 2 times daily (with meals), Disp: 30 tablet, Rfl: 0     cefTRIAXone (ROCEPHIN) 2 GM IV, Inject 2 g  into the vein every 24 hours, Disp: , Rfl:      clopidogrel (PLAVIX) 75 MG tablet, Take 75 mg by mouth daily , Disp: , Rfl:      HYDROmorphone (DILAUDID) 2 MG tablet, Take 0.5 tablets (1 mg) by mouth every 6 hours as needed, Disp: 20 tablet, Rfl: 0     insulin regular (HUMULIN R VIAL) 100 UNIT/ML vial, Inject 2 Units Subcutaneous 2 times daily Also inject Per Sliding Scale; If Blood Sugar is 150 to 199, give 1 Units. If Blood Sugar is 200 to 249, give 2 Units. If Blood Sugar is 250 to 299, give 3 Units. If Blood Sugar is 300 to 349, give 4 Units. If Blood Sugar is 350 to 399, give 5 Units. If Blood Sugar is greater than 399, give 6 Units. If Blood Sugar is greater than 399, call MD. Subcutaneous Before Meals and At Bedtime, Disp: , Rfl:      NOVOLIN N RELION 100 UNIT/ML susp, 28 Units , Disp: , Rfl:      Simvastatin (ZOCOR PO), Take 40 mg by mouth At Bedtime , Disp: , Rfl:      tamsulosin (FLOMAX) 0.4 MG capsule, Take 0.4 mg by mouth daily , Disp: , Rfl:   Allergies   Allergen Reactions     Iodine      nausea     Statin Drugs [Hmg-Coa-R Inhibitors]      cough     Atorvastatin Other (See Comments) and Muscle Pain (Myalgia)     Body aches     Contrast Dye Nausea and Vomiting     Lisinopril Other (See Comments) and Cough     Cough         All Meds and Allergies reviewed in the record at the facility and is the most up-to-date    REVIEW OF SYSTEMS:   Review of Systems  No fevers or chills. No headache, lightheadedness or dizziness. No SOB, chest pains or palpitations. Appetite is good. No nausea, vomiting, constipation or diarrhea. No dysuria, frequency, burning or pain with urination.  Pain in his left knee controlled with Dilaudid patient denies dysphagia.  And Tylenol.  Poor dentition.  Otherwise review of systems are negative.     PHYSICAL EXAMINATION:  Physical Exam     Vital signs: /59   Pulse 74   Temp 98.2  F (36.8  C)   Resp 20   Wt 87.9 kg (193 lb 12.8 oz)   SpO2 100%   BMI 30.35 kg/m    General:  Awake, Alert, oriented x3,  follows simple commands  HEENT: Pink conjunctiva, anicteric sclerae, moist oral mucosa  NECK: Supple, without any lymphadenopathy, or masses  CVS:  S1  S2, without murmur or gallop.   LUNG: Clear to auscultation, No wheezes, rales or rhonci.  BACK: No kyphosis of the thoracic spine  ABDOMEN: Soft, nontender to palpation, with positive bowel sounds  EXTREMITIES: Moves both upper and lower extremities, with some limitation of the left knee.  Patient unable to tolerate 100% extension.  Left knee with dressing in place.  No strikethrough.  He does have some swelling.  No redness or warmth.  Tightness of the hamstring and IT band noted.  SKIN: Warm and dry  NEUROLOGIC: Intact, pulses palpable  PSYCHIATRIC: Flat affect.      Labs:  All labs reviewed in the nursing home record and [facility]   @  Lab Results   Component Value Date    WBC 10.5 03/21/2022    WBC 8.2 02/18/2021     Lab Results   Component Value Date    RBC 3.68 03/21/2022    RBC 3.96 02/18/2021     Lab Results   Component Value Date    HGB 10.8 03/21/2022    HGB 12.4 02/18/2021     Lab Results   Component Value Date    HCT 33.8 03/21/2022    HCT 36.8 02/18/2021     Lab Results   Component Value Date    MCV 92 03/21/2022    MCV 93 02/18/2021     Lab Results   Component Value Date    MCH 29.3 03/21/2022    MCH 31.3 02/18/2021     Lab Results   Component Value Date    MCHC 32.0 03/21/2022    MCHC 33.7 02/18/2021     Lab Results   Component Value Date    RDW 13.2 03/21/2022    RDW 14.0 02/18/2021     Lab Results   Component Value Date     03/21/2022     02/18/2021        @Last Comprehensive Metabolic Panel:  Sodium   Date Value Ref Range Status   03/21/2022 135 (L) 136 - 145 mmol/L Final   02/18/2021 138 133 - 144 mmol/L Final     Potassium   Date Value Ref Range Status   03/21/2022 4.6 3.5 - 5.0 mmol/L Final   02/18/2021 3.7 3.4 - 5.3 mmol/L Final     Chloride   Date Value Ref Range Status   03/21/2022 99 98 - 107 mmol/L Final    02/18/2021 101 94 - 109 mmol/L Final     Carbon Dioxide   Date Value Ref Range Status   02/18/2021 33 (H) 20 - 32 mmol/L Final     Carbon Dioxide (CO2)   Date Value Ref Range Status   03/21/2022 26 22 - 31 mmol/L Final     Anion Gap   Date Value Ref Range Status   03/21/2022 10 5 - 18 mmol/L Final   02/18/2021 4 3 - 14 mmol/L Final     Glucose   Date Value Ref Range Status   03/21/2022 199 (H) 70 - 125 mg/dL Final   02/18/2021 159 (H) 70 - 99 mg/dL Final     Urea Nitrogen   Date Value Ref Range Status   03/21/2022 13 8 - 28 mg/dL Final   02/18/2021 19 7 - 30 mg/dL Final     Creatinine   Date Value Ref Range Status   03/21/2022 0.85 0.70 - 1.30 mg/dL Final   02/18/2021 0.99 0.66 - 1.25 mg/dL Final     GFR Estimate   Date Value Ref Range Status   03/21/2022 83 >60 mL/min/1.73m2 Final     Comment:     Effective December 21, 2021 eGFRcr in adults is calculated using the 2021 CKD-EPI creatinine equation which includes age and gender (Dirk et al., NEJM, DOI: 10.1056/BSMDxp8022917)   02/18/2021 67 >60 mL/min/[1.73_m2] Final     Comment:     Non  GFR Calc  Starting 12/18/2018, serum creatinine based estimated GFR (eGFR) will be   calculated using the Chronic Kidney Disease Epidemiology Collaboration   (CKD-EPI) equation.       Calcium   Date Value Ref Range Status   03/21/2022 8.9 8.5 - 10.5 mg/dL Final   02/18/2021 8.8 8.5 - 10.1 mg/dL Final         Assessment/Plan:    ICD-10-CM    1. Pyogenic arthritis of left knee joint, due to unspecified organism (H)  M00.9 Continue on Rocephin Q 24 hours.   Continue labs weekly. . Hgb 10.8.   Pain controlled with tylenol and diluadid.   Patient unable to tolerate 100% extension.  Therapy to evaluate for hamstring tightness as well as IT band tightness.   2. Type 2 diabetes mellitus with diabetic peripheral angiopathy and gangrene, with long-term current use of insulin (H)  E11.52 Blood sugars consistently elevated in the 200-300s.   Increase am NPH to 28  units. Continue NPH 20 units in evening. Also continue sliding scale with meals.     Z79.4    3. PVD (peripheral vascular disease) (H)  I73.9 Hx of toe amputation. Pt continues on plavix and statin.    4. Coronary artery disease involving native coronary artery of native heart without angina pectoris  I25.10 See above. No CP or SOB.   Blood pressure satisfactory.    5. Hx of cardiac pacemaker  Z95.0 Continue on coreg for rate control.    6. Calcium pyrophosphate deposition disease (CPDD) of knee  M11.869 Continues on tylenol and Diluadid.      This note has been dictated using voice recognition software. Any grammatical or context distortions are unintentional and inherent to the software    Electronically signed by: Barbara Stark CNP

## 2022-03-29 PROBLEM — R52 PAIN: Status: ACTIVE | Noted: 2022-01-01

## 2022-03-29 PROBLEM — M11.869: Status: ACTIVE | Noted: 2022-01-01

## 2022-03-30 NOTE — PROGRESS NOTES
[facility]    Code Status:  FULL CODE   Visit Type:   Chief Complaint   Patient presents with     Nursing Home Acute     TCU Follow up     Facility:  [facility] ([facility]) [[id number]]           History of Present Illness: Jhoan Curran is a 89 year old male who I am seeing today for pre op for tooth extractions. Pt plans to undergo tooth extractions X 2 on 4/1/22 with oral surgeon, 2/t to poor dentition. Patient recently hospitalized at [facility] on 3/12/2022.  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  Patient with a history of 3-day left knee pain with increased swelling and decreased range of motion.  Patient was unable to bear weight.  He underwent arthrocentesis which showed gram-positive cocci.  CRP and lactate were also both elevated.  Eliquis and Plavix held for his I&D.  Patient had a blood culture on 3/13 that became positive for gram-positive cocci.  Vascular surgeon and ID were consulted.  Patient started on vancomycin on 3/13 and underwent I&D of the left knee on 3/13.  On 3/17 patient complained of increased pain in the left lower extremity just above the knee to his upper thigh.  He had a venous Doppler ultrasound which was negative for DVT.  Patient high risk for endocarditis with a history of aortic stenosis.  Patient underwent VEE on 3/16.  Patient had repetitive coughing but no valvular vegetations were seen.  Patient was seen by speech therapy which recommended an esophagram.  There was concern for esophageal stricture being the cause of his dysphagia.  Exam showed only esophageal dysmotility.  Patient does have a multiple areas of tooth decay and infection and will need tooth extraction in the near future.   According to the hospital records the patient's son is making arrangements for this.      On today's visit patient lying in bed. Pyogenic arthritis of the left knee joint with I&D.  Continues on IV antibiotic therapy until 4/11/2022.  Patient continues with some pain in his thigh, groin and hip.  This is most likely secondary to contracture of the left knee with tightening of the hamstring and IT band.  Therapy to attempt massage and trigger point release.  Continues on Tylenol and Dilaudid for pain.  Patient currently afebrile. Recent CRP is 105.2.  Hemoglobin 11.2.  Creatinine within normal limits.  Liver enzymes good.  Patient with underlying diabetes mellitus type 2.  Blood sugar satisfactory. Difficulty chewing secondary to his poor dentition.  Plans are to undergo tooth extraction x2 on 4/1/2022.  We will need to hold his Plavix, apixaban and aspirin 2 days prior.  Hypertension.  Blood pressure satisfactory.  Overflow incontinence on Flomax.  Obstructive sleep apnea on CPAP.    Active Ambulatory Problems     Diagnosis Date Noted     Foreskin does not retract 02/11/2015     Elevated troponin 02/15/2021     SOB (shortness of breath) 02/15/2021     Elevated brain natriuretic peptide (BNP) level 02/15/2021     PVD (peripheral vascular disease) (H) 05/03/2007     Coronary artery disease involving native coronary artery of native heart without angina pectoris 02/15/2021     Acute decompensated heart failure (H) 02/15/2021     Bilateral pleural effusion 02/15/2021     COVID-19 ruled out 02/15/2021     Overflow incontinence of urine 09/14/2018     Hyperlipidemia LDL goal <100      Diabetes mellitus, type 2 (H)      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) 05/03/2007     Gangrene of toe of right foot (H) 02/10/2021     Critical lower limb ischemia (H) 02/04/2021     Right bundle branch block 05/03/2007     Acute respiratory failure with hypoxia and hypercapnia (H) 02/15/2021     Pulmonary edema  02/15/2021     Cough 03/22/2022     Calculus of kidney 05/03/2007     Calcium pyrophosphate deposition disease (CPDD) of knee 03/14/2022     Benign neoplasm of colon 05/03/2007     Aortic stenosis 05/03/2007     Neurogenic claudication (H) 06/23/2015     Microalbuminuria due to type 2 diabetes mellitus (H) 02/09/2022     Mobitz (type) I (Wenckebach's) atrioventricular block 05/10/2021     Hyperlipidemia associated with type 2 diabetes mellitus (H) 05/03/2007     Hx of cardiac pacemaker 03/12/2022     History of amputation of great toe (H) 02/08/2022     Glaucoma 11/23/2007     Gastroesophageal reflux disease without esophagitis 11/23/2007     SILVANO (obstructive sleep apnea) 08/25/2011     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) 02/10/2021     Pyogenic arthritis of left knee joint (H) 03/12/2022     Type 2 diabetes mellitus with diabetic peripheral angiopathy and gangrene, with long-term current use of insulin (H) 06/22/2015     Vitamin D deficiency 03/02/2022     Pain 03/29/2022     Resolved Ambulatory Problems     Diagnosis Date Noted     No Resolved Ambulatory Problems     Past Medical History:   Diagnosis Date     Atherosclerosis of right lower extremity with gangrene (H)      CAD (coronary artery disease)      HTN (hypertension)      Overflow incontinence      PAD (peripheral artery disease) (H)      RBBB        Current Outpatient Medications:      acetaminophen (TYLENOL) 500 MG tablet, Take 1,000 mg by mouth 3 times daily , Disp: , Rfl:      apixaban ANTICOAGULANT (ELIQUIS) 2.5 MG tablet, Take 5 mg by mouth 2 times daily, Disp: , Rfl:      aspirin (ASA) 81 MG EC tablet, Take 1 tablet (81 mg) by mouth daily, Disp: 30 tablet, Rfl: 0     carvedilol (COREG) 6.25 MG tablet, Take 0.5 tablets (3.125 mg) by mouth 2 times daily (with meals), Disp: 30 tablet, Rfl: 0     cefTRIAXone (ROCEPHIN) 2 GM IV, Inject 2 g into the vein every 24 hours, Disp: , Rfl:      clopidogrel (PLAVIX) 75 MG tablet, Take 75 mg  "by mouth daily , Disp: , Rfl:      HYDROmorphone (DILAUDID) 2 MG tablet, Take 0.5 tablets (1 mg) by mouth every 6 hours as needed, Disp: 20 tablet, Rfl: 0     insulin regular (HUMULIN R VIAL) 100 UNIT/ML vial, Inject 2 Units Subcutaneous 2 times daily Also inject Per Sliding Scale; If Blood Sugar is 150 to 199, give 1 Units. If Blood Sugar is 200 to 249, give 2 Units. If Blood Sugar is 250 to 299, give 3 Units. If Blood Sugar is 300 to 349, give 4 Units. If Blood Sugar is 350 to 399, give 5 Units. If Blood Sugar is greater than 399, give 6 Units. If Blood Sugar is greater than 399, call MD. Subcutaneous Before Meals and At Bedtime, Disp: , Rfl:      NOVOLIN N RELION 100 UNIT/ML susp, 28 Units , Disp: , Rfl:      Simvastatin (ZOCOR PO), Take 40 mg by mouth At Bedtime , Disp: , Rfl:      tamsulosin (FLOMAX) 0.4 MG capsule, Take 0.4 mg by mouth daily , Disp: , Rfl:   Allergies   Allergen Reactions     Iodine      nausea     Statin Drugs [Hmg-Coa-R Inhibitors]      cough     Atorvastatin Other (See Comments) and Muscle Pain (Myalgia)     Body aches     Contrast Dye Nausea and Vomiting     Lisinopril Other (See Comments) and Cough     Cough         All Meds and Allergies reviewed in the record at the facility and is the most up-to-date    REVIEW OF SYSTEMS:   Review of Systems  No fevers or chills. No headache, lightheadedness or dizziness. No SOB, chest pains or palpitations. Appetite is good. No nausea, vomiting, constipation or diarrhea. No dysuria, frequency, burning or pain with urination.  Pain in his left knee controlled with Dilaudid patient denies dysphagia.  And Tylenol.  Poor dentition.  Otherwise review of systems are negative.     PHYSICAL EXAMINATION:  Physical Exam     Vital signs: /66   Pulse 87   Temp 96.8  F (36  C)   Resp 28   Ht 1.702 m (5' 7\")   Wt 87.7 kg (193 lb 6.4 oz)   SpO2 97%   BMI 30.29 kg/m    General: Awake, Alert, oriented x3,  follows simple commands  HEENT: Pink conjunctiva, " anicteric sclerae, moist oral mucosa  NECK: Supple, without any lymphadenopathy, or masses  CVS:  S1  S2, without murmur or gallop.   LUNG: Clear to auscultation, No wheezes, rales or rhonci.  BACK: No kyphosis of the thoracic spine  ABDOMEN: Soft, nontender to palpation, with positive bowel sounds  EXTREMITIES: Moves both upper and lower extremities, with some limitation of the left knee.  Patient unable to tolerate 100% extension.  Left knee with dressing in place.  No strikethrough.  He does have some swelling.  No redness or warmth.  Tightness of the hamstring and IT band noted.  SKIN: Warm and dry  NEUROLOGIC: Intact, pulses palpable  PSYCHIATRIC: Flat affect.      Labs:  All labs reviewed in the nursing home record and [facility]   @  Lab Results   Component Value Date    WBC 10.5 03/21/2022    WBC 8.2 02/18/2021     Lab Results   Component Value Date    RBC 3.68 03/21/2022    RBC 3.96 02/18/2021     Lab Results   Component Value Date    HGB 10.8 03/21/2022    HGB 12.4 02/18/2021     Lab Results   Component Value Date    HCT 33.8 03/21/2022    HCT 36.8 02/18/2021     Lab Results   Component Value Date    MCV 92 03/21/2022    MCV 93 02/18/2021     Lab Results   Component Value Date    MCH 29.3 03/21/2022    MCH 31.3 02/18/2021     Lab Results   Component Value Date    MCHC 32.0 03/21/2022    MCHC 33.7 02/18/2021     Lab Results   Component Value Date    RDW 13.2 03/21/2022    RDW 14.0 02/18/2021     Lab Results   Component Value Date     03/21/2022     02/18/2021        @Last Comprehensive Metabolic Panel:  Sodium   Date Value Ref Range Status   03/21/2022 135 (L) 136 - 145 mmol/L Final   02/18/2021 138 133 - 144 mmol/L Final     Potassium   Date Value Ref Range Status   03/21/2022 4.6 3.5 - 5.0 mmol/L Final   02/18/2021 3.7 3.4 - 5.3 mmol/L Final     Chloride   Date Value Ref Range Status   03/21/2022 99 98 - 107 mmol/L Final   02/18/2021 101 94 - 109 mmol/L Final     Carbon Dioxide   Date Value Ref  Range Status   02/18/2021 33 (H) 20 - 32 mmol/L Final     Carbon Dioxide (CO2)   Date Value Ref Range Status   03/21/2022 26 22 - 31 mmol/L Final     Anion Gap   Date Value Ref Range Status   03/21/2022 10 5 - 18 mmol/L Final   02/18/2021 4 3 - 14 mmol/L Final     Glucose   Date Value Ref Range Status   03/21/2022 199 (H) 70 - 125 mg/dL Final   02/18/2021 159 (H) 70 - 99 mg/dL Final     Urea Nitrogen   Date Value Ref Range Status   03/21/2022 13 8 - 28 mg/dL Final   02/18/2021 19 7 - 30 mg/dL Final     Creatinine   Date Value Ref Range Status   03/29/2022 0.89 0.70 - 1.30 mg/dL Final   02/18/2021 0.99 0.66 - 1.25 mg/dL Final     GFR Estimate   Date Value Ref Range Status   03/29/2022 81 >60 mL/min/1.73m2 Final     Comment:     Effective December 21, 2021 eGFRcr in adults is calculated using the 2021 CKD-EPI creatinine equation which includes age and gender (Dirk et al., NEJM, DOI: 10.1056/BVVKcw7234702)   02/18/2021 67 >60 mL/min/[1.73_m2] Final     Comment:     Non  GFR Calc  Starting 12/18/2018, serum creatinine based estimated GFR (eGFR) will be   calculated using the Chronic Kidney Disease Epidemiology Collaboration   (CKD-EPI) equation.       Calcium   Date Value Ref Range Status   03/21/2022 8.9 8.5 - 10.5 mg/dL Final   02/18/2021 8.8 8.5 - 10.1 mg/dL Final         Assessment/Plan:    ICD-10-CM    1. Pyogenic arthritis of left knee joint, due to unspecified organism (H)  M00.9 Continue on Rocephin Q 24 hours until 4/11/2022.  Continue labs weekly. . Hgb now 11.2.  BMP unremarkable.  Pain controlled with tylenol and diluadid.   Patient unable to tolerate 100% extension.  Therapy to evaluate for hamstring tightness as well as IT band tightness.   2. Type 2 diabetes mellitus with diabetic peripheral angiopathy and gangrene, with long-term current use of insulin (H)  E11.52  recent increase in am NPH to 28 units. Continue NPH 20 units in evening. Also continue sliding scale with meals.   Blood sugars improved.    Z79.4    3. PVD (peripheral vascular disease) (H)  I73.9 Hx of toe amputation. Pt continues on plavix and statin.    4. Coronary artery disease involving native coronary artery of native heart without angina pectoris  I25.10 See above. No CP or SOB.   Blood pressure satisfactory.    5. Hx of cardiac pacemaker  Z95.0 Continue on coreg for rate control.    6.   Poor dentition  Plans to undergo tooth extraction on 4/1/22. Hold ASA, Plavix and Apixiban 2 days prior. Resume evening post extraction.       This note has been dictated using voice recognition software. Any grammatical or context distortions are unintentional and inherent to the software    Electronically signed by: Barbara Stark, CNP

## 2022-04-01 NOTE — PROGRESS NOTES
[facility] GERIATRIC SERVICES    Code Status:  FULL CODE   Visit Type:   Chief Complaint   Patient presents with     Nursing Home Acute     TCU Follow up     Facility:  [facility] ([facility]) [[id number]]           History of Present Illness: Jhoan Curran is a 89 year old male who I am seeing today for follow-up on the TCU. Pt plans to undergo tooth extractions X 2 on 4/1/22 with oral surgeon, 2/t to poor dentition. Patient recently hospitalized at [facility] on 3/12/2022.  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  Patient with a history of 3-day left knee pain with increased swelling and decreased range of motion.  Patient was unable to bear weight.  He underwent arthrocentesis which showed gram-positive cocci.  CRP and lactate were also both elevated.  Eliquis and Plavix held for his I&D.  Patient had a blood culture on 3/13 that became positive for gram-positive cocci.  Vascular surgeon and ID were consulted.  Patient started on vancomycin on 3/13 and underwent I&D of the left knee on 3/13.  On 3/17 patient complained of increased pain in the left lower extremity just above the knee to his upper thigh.  He had a venous Doppler ultrasound which was negative for DVT.  Patient high risk for endocarditis with a history of aortic stenosis.  Patient underwent VEE on 3/16.  Patient had repetitive coughing but no valvular vegetations were seen.  Patient was seen by speech therapy which recommended an esophagram.  There was concern for esophageal stricture being the cause of his dysphagia.  Exam showed only esophageal dysmotility.  Patient does have a multiple areas of tooth decay and infection and will need tooth extraction in the near future.   According to the hospital records the patient's son is making arrangements for this.      On today's visit patient lying in bed. Pyogenic arthritis of the left knee joint with I&D.  Continues on IV antibiotic therapy until 4/11/2022.  Patient continues with some pain in his thigh, groin and hip and today into his SI joint.  This is most likely secondary to contracture of the left knee with tightening of the hamstring and IT band.  Therapy to start a massage and trigger point release.  Today I discussed with patient possible use of muscle relaxer.  His thigh muscle is very tight with tightness over the IT band and SI joint.  Full range of motion noted.  No redness no warmth.  Patient currently on Tylenol and Dilaudid for pain.  Tomorrow patient is to undergo tooth extractions x2.  His Plavix, apixaban and aspirin are currently on hold.  Hypertension.  Satisfactory.  Overflow incontinence on Flomax.  Obstructive sleep apnea on CPAP.  Diabetes.  Blood sugars on the low side in the 60s to 70s.      Active Ambulatory Problems     Diagnosis Date Noted     Foreskin does not retract 02/11/2015     Elevated troponin 02/15/2021     SOB (shortness of breath) 02/15/2021     Elevated brain natriuretic peptide (BNP) level 02/15/2021     PVD (peripheral vascular disease) (H) 05/03/2007     Coronary artery disease involving native coronary artery of native heart without angina pectoris 02/15/2021     Acute decompensated heart failure (H) 02/15/2021     Bilateral pleural effusion 02/15/2021     COVID-19 ruled out 02/15/2021     Overflow incontinence of urine 09/14/2018     Hyperlipidemia LDL goal <100      Diabetes mellitus, type 2 (H)      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) 05/03/2007     Gangrene of toe of right foot (H) 02/10/2021     Critical lower limb ischemia (H) 02/04/2021     Right bundle branch block 05/03/2007     Acute respiratory failure with hypoxia and hypercapnia (H) 02/15/2021      Pulmonary edema 02/15/2021     Cough 03/22/2022     Calculus of kidney 05/03/2007     Calcium pyrophosphate deposition disease (CPDD) of knee 03/14/2022     Benign neoplasm of colon 05/03/2007     Aortic stenosis 05/03/2007     Neurogenic claudication (H) 06/23/2015     Microalbuminuria due to type 2 diabetes mellitus (H) 02/09/2022     Mobitz (type) I (Wenckebach's) atrioventricular block 05/10/2021     Hyperlipidemia associated with type 2 diabetes mellitus (H) 05/03/2007     Hx of cardiac pacemaker 03/12/2022     History of amputation of great toe (H) 02/08/2022     Glaucoma 11/23/2007     Gastroesophageal reflux disease without esophagitis 11/23/2007     SILVANO (obstructive sleep apnea) 08/25/2011     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) 02/10/2021     Pyogenic arthritis of left knee joint (H) 03/12/2022     Type 2 diabetes mellitus with diabetic peripheral angiopathy and gangrene, with long-term current use of insulin (H) 06/22/2015     Vitamin D deficiency 03/02/2022     Pain 03/29/2022     Resolved Ambulatory Problems     Diagnosis Date Noted     No Resolved Ambulatory Problems     Past Medical History:   Diagnosis Date     Atherosclerosis of right lower extremity with gangrene (H)      CAD (coronary artery disease)      HTN (hypertension)      Overflow incontinence      PAD (peripheral artery disease) (H)      RBBB        Current Outpatient Medications:      methocarbamol (ROBAXIN) 500 MG tablet, Take 250 mg by mouth 2 times daily, Disp: , Rfl:      acetaminophen (TYLENOL) 500 MG tablet, Take 1,000 mg by mouth 3 times daily , Disp: , Rfl:      apixaban ANTICOAGULANT (ELIQUIS) 2.5 MG tablet, Take 5 mg by mouth 2 times daily, Disp: , Rfl:      aspirin (ASA) 81 MG EC tablet, Take 1 tablet (81 mg) by mouth daily, Disp: 30 tablet, Rfl: 0     carvedilol (COREG) 6.25 MG tablet, Take 0.5 tablets (3.125 mg) by mouth 2 times daily (with meals), Disp: 30 tablet, Rfl: 0     cefTRIAXone (ROCEPHIN) 2 GM  IV, Inject 2 g into the vein every 24 hours, Disp: , Rfl:      clopidogrel (PLAVIX) 75 MG tablet, Take 75 mg by mouth daily , Disp: , Rfl:      HYDROmorphone (DILAUDID) 2 MG tablet, Take 0.5 tablets (1 mg) by mouth every 6 hours as needed, Disp: 20 tablet, Rfl: 0     insulin regular (HUMULIN R VIAL) 100 UNIT/ML vial, Inject 2 Units Subcutaneous 2 times daily Also inject Per Sliding Scale; If Blood Sugar is 150 to 199, give 1 Units. If Blood Sugar is 200 to 249, give 2 Units. If Blood Sugar is 250 to 299, give 3 Units. If Blood Sugar is 300 to 349, give 4 Units. If Blood Sugar is 350 to 399, give 5 Units. If Blood Sugar is greater than 399, give 6 Units. If Blood Sugar is greater than 399, call MD. Subcutaneous Before Meals and At Bedtime, Disp: , Rfl:      NOVOLIN N RELION 100 UNIT/ML susp, 28 Units , Disp: , Rfl:      Simvastatin (ZOCOR PO), Take 40 mg by mouth At Bedtime , Disp: , Rfl:      tamsulosin (FLOMAX) 0.4 MG capsule, Take 0.4 mg by mouth daily , Disp: , Rfl:   Allergies   Allergen Reactions     Iodine      nausea     Statin Drugs [Hmg-Coa-R Inhibitors]      cough     Atorvastatin Other (See Comments) and Muscle Pain (Myalgia)     Body aches     Contrast Dye Nausea and Vomiting     Lisinopril Other (See Comments) and Cough     Cough         All Meds and Allergies reviewed in the record at the facility and is the most up-to-date    REVIEW OF SYSTEMS:   Review of Systems  No fevers or chills. No headache, lightheadedness or dizziness. No SOB, chest pains or palpitations. Appetite is good. No nausea, vomiting, constipation or diarrhea. No dysuria, frequency, burning or pain with urination.  Pain in his left knee controlled with Dilaudid and Tylenol.  Increasing muscle pain in the left quad, hamstring, groin and SI joint.  Patient denies dysphagia.  Poor dentition.  Otherwise review of systems are negative.     PHYSICAL EXAMINATION:  Physical Exam     Vital signs: BP 95/62   Pulse 95   Temp (!) 96.7  F (35.9  " C)   Resp 18   Ht 1.702 m (5' 7\")   Wt 87.7 kg (193 lb 6.4 oz)   SpO2 96%   BMI 30.29 kg/m    General: Awake, Alert, oriented x3,  follows simple commands  HEENT: Pink conjunctiva, anicteric sclerae, moist oral mucosa  NECK: Supple, without any lymphadenopathy, or masses  CVS:  S1  S2, without murmur or gallop.   LUNG: Clear to auscultation, No wheezes, rales or rhonci.  BACK: No kyphosis of the thoracic spine  ABDOMEN: Soft, nontender to palpation, with positive bowel sounds  EXTREMITIES: Moves both upper and lower extremities, with some limitation of the left knee.  Patient unable to tolerate 100% extension.  Left knee with dressing in place.  Swelling greatly improved.  No redness or warmth.  Tightness of the hamstring, IT band and SI joint noted.  No redness or warmth.  SKIN: Warm and dry  NEUROLOGIC: Intact, pulses palpable  PSYCHIATRIC: Flat affect.      Labs:  All labs reviewed in the nursing home record and [facility]   @  Lab Results   Component Value Date    WBC 10.5 03/21/2022    WBC 8.2 02/18/2021     Lab Results   Component Value Date    RBC 3.68 03/21/2022    RBC 3.96 02/18/2021     Lab Results   Component Value Date    HGB 10.8 03/21/2022    HGB 12.4 02/18/2021     Lab Results   Component Value Date    HCT 33.8 03/21/2022    HCT 36.8 02/18/2021     Lab Results   Component Value Date    MCV 92 03/21/2022    MCV 93 02/18/2021     Lab Results   Component Value Date    MCH 29.3 03/21/2022    MCH 31.3 02/18/2021     Lab Results   Component Value Date    MCHC 32.0 03/21/2022    MCHC 33.7 02/18/2021     Lab Results   Component Value Date    RDW 13.2 03/21/2022    RDW 14.0 02/18/2021     Lab Results   Component Value Date     03/21/2022     02/18/2021        @Last Comprehensive Metabolic Panel:  Sodium   Date Value Ref Range Status   03/21/2022 135 (L) 136 - 145 mmol/L Final   02/18/2021 138 133 - 144 mmol/L Final     Potassium   Date Value Ref Range Status   03/21/2022 4.6 3.5 - 5.0 mmol/L " Final   02/18/2021 3.7 3.4 - 5.3 mmol/L Final     Chloride   Date Value Ref Range Status   03/21/2022 99 98 - 107 mmol/L Final   02/18/2021 101 94 - 109 mmol/L Final     Carbon Dioxide   Date Value Ref Range Status   02/18/2021 33 (H) 20 - 32 mmol/L Final     Carbon Dioxide (CO2)   Date Value Ref Range Status   03/21/2022 26 22 - 31 mmol/L Final     Anion Gap   Date Value Ref Range Status   03/21/2022 10 5 - 18 mmol/L Final   02/18/2021 4 3 - 14 mmol/L Final     Glucose   Date Value Ref Range Status   03/21/2022 199 (H) 70 - 125 mg/dL Final   02/18/2021 159 (H) 70 - 99 mg/dL Final     Urea Nitrogen   Date Value Ref Range Status   03/21/2022 13 8 - 28 mg/dL Final   02/18/2021 19 7 - 30 mg/dL Final     Creatinine   Date Value Ref Range Status   03/29/2022 0.89 0.70 - 1.30 mg/dL Final   02/18/2021 0.99 0.66 - 1.25 mg/dL Final     GFR Estimate   Date Value Ref Range Status   03/29/2022 81 >60 mL/min/1.73m2 Final     Comment:     Effective December 21, 2021 eGFRcr in adults is calculated using the 2021 CKD-EPI creatinine equation which includes age and gender (Dirk graham al., NEJ, DOI: 10.1056/DJJSdd6124370)   02/18/2021 67 >60 mL/min/[1.73_m2] Final     Comment:     Non  GFR Calc  Starting 12/18/2018, serum creatinine based estimated GFR (eGFR) will be   calculated using the Chronic Kidney Disease Epidemiology Collaboration   (CKD-EPI) equation.       Calcium   Date Value Ref Range Status   03/21/2022 8.9 8.5 - 10.5 mg/dL Final   02/18/2021 8.8 8.5 - 10.1 mg/dL Final         Assessment/Plan:    ICD-10-CM    1. Pyogenic arthritis of left knee joint, due to unspecified organism (H)  M00.9 Continue on Rocephin Q 24 hours until 4/11/2022.  Continue labs weekly. . Hgb now 11.2.  BMP unremarkable.  Pain controlled with tylenol and diluadid.   Patient unable to tolerate 100% extension.  Therapy to evaluate for hamstring tightness as well as IT band tightness.  Start methocarbamol 250 mg twice daily.   2.  Type 2 diabetes mellitus with diabetic peripheral angiopathy and gangrene, with long-term current use of insulin (H)  E11.52  a.m. blood sugars on the low side in the 60s to 70s.  Continueam NPH 28 units.  Decrease NPH in evening to 10 units.  Also continue sliding scale with meals.     Z79.4    3. PVD (peripheral vascular disease) (H)  I73.9 Hx of toe amputation. Pt continues on plavix and statin.    4. Coronary artery disease involving native coronary artery of native heart without angina pectoris  I25.10 See above. No CP or SOB.   Blood pressure satisfactory.    5. Hx of cardiac pacemaker  Z95.0 Continue on coreg for rate control.    6.   Poor dentition  Plans to undergo tooth extraction on 4/1/22. Hold ASA, Plavix and Apixiban 2 days prior. Resume evening post extraction.       This note has been dictated using voice recognition software. Any grammatical or context distortions are unintentional and inherent to the software    Electronically signed by: Barbara Stark, CNP

## 2022-04-02 NOTE — TELEPHONE ENCOUNTER
Jhoan Curran is a 89 year old  (2/19/1932), Nurse called today to report: hyperglycemia    ASSESSMENT/PLAN  NPH and Humulin R being held per have recent tooth extractions with poor PO, BS now 290    Advised to cover BS with PTA humulin R 5U and continue to monitor at meals    Electronically signed by:   Kevin Sanchez NP

## 2022-04-05 NOTE — PROGRESS NOTES
[facility]    Code Status:  FULL CODE   Visit Type:   Chief Complaint   Patient presents with     Nursing Home Acute     TCU Follow up     Facility:  [facility] ([facility]) [[id number]]           History of Present Illness: Jhoan Curran is a 89 year old male who I am seeing today for follow-up on the TCU. Pt recently underwent tooth extractions on 4/1/22 with oral surgeon, 2/t to poor dentition. Patient recently hospitalized at [facility] on 3/12/2022.  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  Patient with a history of 3-day left knee pain with increased swelling and decreased range of motion.  Patient was unable to bear weight.  He underwent arthrocentesis which showed gram-positive cocci.  CRP and lactate were also both elevated.  Eliquis and Plavix held for his I&D.  Patient had a blood culture on 3/13 that became positive for gram-positive cocci.  Vascular surgeon and ID were consulted.  Patient started on vancomycin on 3/13 and underwent I&D of the left knee on 3/13.  On 3/17 patient complained of increased pain in the left lower extremity just above the knee to his upper thigh.  He had a venous Doppler ultrasound which was negative for DVT.  Patient high risk for endocarditis with a history of aortic stenosis.  Patient underwent VEE on 3/16.  Patient had repetitive coughing but no valvular vegetations were seen.  Patient was seen by speech therapy which recommended an esophagram.  There was concern for esophageal stricture being the cause of his dysphagia.  Exam showed only esophageal dysmotility.  Patient does have a multiple areas of tooth decay and infection and will need tooth extraction in the near future.  According  to the hospital records the patient's son is making arrangements for this.      On today's visit patient lying in bed. Recent tooth extractions on the lower gum. Pt tolerated well. His anticoagulation was held 2 days prior but have been resumed. He has had poor oral intake due to difficulty chewing. His diet was down graded. DM type II. Initially his blood sugars were low due to poor dentition. His insulin was held if less than 50% of meals consumed. Blood sugars improved now on the higher side. He is eating better. Pyogenic arthritis of the left knee joint with I&D.  Continues on IV antibiotic therapy until 4/11/2022. This will most likely need to be extended with needing more tooth extractions on the top on 4/13/22. I will consult with ID. Pain in his left thigh, groin and hip improving with addition of Methocarbamol. He also continue on tylenol and occasional use of Hydromorphone. Therapy is also treating for pain modalities with massage and trigger point release. Hypertension.  Satisfactory.  Overflow incontinence on Flomax.  Obstructive sleep apnea on CPAP.      Active Ambulatory Problems     Diagnosis Date Noted     Foreskin does not retract 02/11/2015     Elevated troponin 02/15/2021     SOB (shortness of breath) 02/15/2021     Elevated brain natriuretic peptide (BNP) level 02/15/2021     PVD (peripheral vascular disease) (H) 05/03/2007     Coronary artery disease involving native coronary artery of native heart without angina pectoris 02/15/2021     Acute decompensated heart failure (H) 02/15/2021     Bilateral pleural effusion 02/15/2021     COVID-19 ruled out 02/15/2021     Overflow incontinence of urine 09/14/2018     Hyperlipidemia LDL goal <100      Diabetes mellitus, type 2 (H)      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) 05/03/2007     Gangrene of toe of right foot (H) 02/10/2021     Critical lower limb ischemia (H) 02/04/2021     Right bundle branch block 05/03/2007     Acute  respiratory failure with hypoxia and hypercapnia (H) 02/15/2021     Pulmonary edema 02/15/2021     Cough 03/22/2022     Calculus of kidney 05/03/2007     Calcium pyrophosphate deposition disease (CPDD) of knee 03/14/2022     Benign neoplasm of colon 05/03/2007     Aortic stenosis 05/03/2007     Neurogenic claudication (H) 06/23/2015     Microalbuminuria due to type 2 diabetes mellitus (H) 02/09/2022     Mobitz (type) I (Wenckebach's) atrioventricular block 05/10/2021     Hyperlipidemia associated with type 2 diabetes mellitus (H) 05/03/2007     Hx of cardiac pacemaker 03/12/2022     History of amputation of great toe (H) 02/08/2022     Glaucoma 11/23/2007     Gastroesophageal reflux disease without esophagitis 11/23/2007     SILVANO (obstructive sleep apnea) 08/25/2011     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) 02/10/2021     Pyogenic arthritis of left knee joint (H) 03/12/2022     Type 2 diabetes mellitus with diabetic peripheral angiopathy and gangrene, with long-term current use of insulin (H) 06/22/2015     Vitamin D deficiency 03/02/2022     Pain 03/29/2022     Resolved Ambulatory Problems     Diagnosis Date Noted     No Resolved Ambulatory Problems     Past Medical History:   Diagnosis Date     Atherosclerosis of right lower extremity with gangrene (H)      CAD (coronary artery disease)      HTN (hypertension)      Overflow incontinence      PAD (peripheral artery disease) (H)      RBBB        Current Outpatient Medications:      acetaminophen (TYLENOL) 500 MG tablet, Take 1,000 mg by mouth 3 times daily , Disp: , Rfl:      apixaban ANTICOAGULANT (ELIQUIS) 2.5 MG tablet, Take 5 mg by mouth 2 times daily, Disp: , Rfl:      aspirin (ASA) 81 MG EC tablet, Take 1 tablet (81 mg) by mouth daily, Disp: 30 tablet, Rfl: 0     carvedilol (COREG) 6.25 MG tablet, Take 0.5 tablets (3.125 mg) by mouth 2 times daily (with meals), Disp: 30 tablet, Rfl: 0     cefTRIAXone (ROCEPHIN) 2 GM IV, Inject 2 g into the  vein every 24 hours, Disp: , Rfl:      clopidogrel (PLAVIX) 75 MG tablet, Take 75 mg by mouth daily , Disp: , Rfl:      HYDROmorphone (DILAUDID) 2 MG tablet, Take 0.5 tablets (1 mg) by mouth every 6 hours as needed, Disp: 20 tablet, Rfl: 0     insulin regular (HUMULIN R VIAL) 100 UNIT/ML vial, Inject 2 Units Subcutaneous 2 times daily Also inject Per Sliding Scale; If Blood Sugar is 150 to 199, give 1 Units. If Blood Sugar is 200 to 249, give 2 Units. If Blood Sugar is 250 to 299, give 3 Units. If Blood Sugar is 300 to 349, give 4 Units. If Blood Sugar is 350 to 399, give 5 Units. If Blood Sugar is greater than 399, give 6 Units. If Blood Sugar is greater than 399, call MD. Subcutaneous Before Meals and At Bedtime, Disp: , Rfl:      methocarbamol (ROBAXIN) 500 MG tablet, Take 250 mg by mouth 2 times daily, Disp: , Rfl:      NOVOLIN N RELION 100 UNIT/ML susp, 28 Units , Disp: , Rfl:      Simvastatin (ZOCOR PO), Take 40 mg by mouth At Bedtime , Disp: , Rfl:      tamsulosin (FLOMAX) 0.4 MG capsule, Take 0.4 mg by mouth daily , Disp: , Rfl:   Allergies   Allergen Reactions     Iodine      nausea     Statin Drugs [Hmg-Coa-R Inhibitors]      cough     Atorvastatin Other (See Comments) and Muscle Pain (Myalgia)     Body aches     Contrast Dye Nausea and Vomiting     Lisinopril Other (See Comments) and Cough     Cough         All Meds and Allergies reviewed in the record at the facility and is the most up-to-date    REVIEW OF SYSTEMS:   Review of Systems  No fevers or chills. No headache, lightheadedness or dizziness. No SOB, chest pains or palpitations. Appetite is good. No nausea, vomiting, constipation or diarrhea. No dysuria, frequency, burning or pain with urination.  Pain in his left knee controlled with Dilaudid and Tylenol. Muscle pain in the left quad, hamstring, groin and SI joint improving with muscle relaxer. Poor dentition with difficulty chewing post tooth extractions.  Otherwise review of systems are  "negative.     PHYSICAL EXAMINATION:  Physical Exam     Vital signs: /62   Pulse 65   Temp 97.1  F (36.2  C)   Resp 16   Ht 1.702 m (5' 7\")   Wt 87.7 kg (193 lb 6.4 oz)   SpO2 96%   BMI 30.29 kg/m    General: Awake, Alert, oriented x3,  follows simple commands  HEENT: Pink conjunctiva, anicteric sclerae, moist oral mucosa. Bruising to face and mandible region from recent tooth extractions.   NECK: Supple, without any lymphadenopathy, or masses  CVS:  S1  S2, without murmur or gallop.   LUNG: Clear to auscultation, No wheezes, rales or rhonci.  BACK: No kyphosis of the thoracic spine  ABDOMEN: Soft, nontender to palpation, with positive bowel sounds  EXTREMITIES: Moves both upper and lower extremities, with some limitation of the left knee.  Patient unable to tolerate 100% extension.  Left knee with dressing in place.  Swelling greatly improved.  No redness or warmth.  Tightness of the hamstring, IT band improving.   SKIN: Warm and dry  NEUROLOGIC: Intact, pulses palpable  PSYCHIATRIC: Flat affect.      Labs:  All labs reviewed in the nursing home record and [facility]   @  Lab Results   Component Value Date    WBC 10.5 03/21/2022    WBC 8.2 02/18/2021     Lab Results   Component Value Date    RBC 3.68 03/21/2022    RBC 3.96 02/18/2021     Lab Results   Component Value Date    HGB 10.8 03/21/2022    HGB 12.4 02/18/2021     Lab Results   Component Value Date    HCT 33.8 03/21/2022    HCT 36.8 02/18/2021     Lab Results   Component Value Date    MCV 92 03/21/2022    MCV 93 02/18/2021     Lab Results   Component Value Date    MCH 29.3 03/21/2022    MCH 31.3 02/18/2021     Lab Results   Component Value Date    MCHC 32.0 03/21/2022    MCHC 33.7 02/18/2021     Lab Results   Component Value Date    RDW 13.2 03/21/2022    RDW 14.0 02/18/2021     Lab Results   Component Value Date     03/21/2022     02/18/2021        @Last Comprehensive Metabolic Panel:  Sodium   Date Value Ref Range Status   03/21/2022 " 135 (L) 136 - 145 mmol/L Final   02/18/2021 138 133 - 144 mmol/L Final     Potassium   Date Value Ref Range Status   03/21/2022 4.6 3.5 - 5.0 mmol/L Final   02/18/2021 3.7 3.4 - 5.3 mmol/L Final     Chloride   Date Value Ref Range Status   03/21/2022 99 98 - 107 mmol/L Final   02/18/2021 101 94 - 109 mmol/L Final     Carbon Dioxide   Date Value Ref Range Status   02/18/2021 33 (H) 20 - 32 mmol/L Final     Carbon Dioxide (CO2)   Date Value Ref Range Status   03/21/2022 26 22 - 31 mmol/L Final     Anion Gap   Date Value Ref Range Status   03/21/2022 10 5 - 18 mmol/L Final   02/18/2021 4 3 - 14 mmol/L Final     Glucose   Date Value Ref Range Status   03/21/2022 199 (H) 70 - 125 mg/dL Final   02/18/2021 159 (H) 70 - 99 mg/dL Final     Urea Nitrogen   Date Value Ref Range Status   03/21/2022 13 8 - 28 mg/dL Final   02/18/2021 19 7 - 30 mg/dL Final     Creatinine   Date Value Ref Range Status   03/29/2022 0.89 0.70 - 1.30 mg/dL Final   02/18/2021 0.99 0.66 - 1.25 mg/dL Final     GFR Estimate   Date Value Ref Range Status   03/29/2022 81 >60 mL/min/1.73m2 Final     Comment:     Effective December 21, 2021 eGFRcr in adults is calculated using the 2021 CKD-EPI creatinine equation which includes age and gender (Dirk et al., NEJM, DOI: 10.1056/TMMEaa1783518)   02/18/2021 67 >60 mL/min/[1.73_m2] Final     Comment:     Non  GFR Calc  Starting 12/18/2018, serum creatinine based estimated GFR (eGFR) will be   calculated using the Chronic Kidney Disease Epidemiology Collaboration   (CKD-EPI) equation.       Calcium   Date Value Ref Range Status   03/21/2022 8.9 8.5 - 10.5 mg/dL Final   02/18/2021 8.8 8.5 - 10.1 mg/dL Final         Assessment/Plan:    ICD-10-CM    1. Pyogenic arthritis of left knee joint, due to unspecified organism (H)  M00.9 Continue on Rocephin Q 24 hours until 4/11/2022.   Continue labs weekly due in am.   Pain controlled with tylenol and diluadid.   Patient unable to tolerate 100% extension.   Hamstring tightness as well as IT band tightness improving with Methocarbamol. Therapy continues to offer pain modalities.    2. Poor dentition requiring referral to dentistry  Z76.89 Recent lower gum tooth extractions.   Plans to undergo top, tooth extractions on 4/13/22. He will most likely need his antibiotics extended to cover for high risk AVS.    3. Aortic valve stenosis, etiology of cardiac valve disease unspecified  I35.0 Anticoagulants resumed post tooth extractions. They will need to held again 2 days prior to 4/13/22.    4. Type 2 diabetes mellitus with diabetic peripheral angiopathy and gangrene, with long-term current use of insulin (H)  E11.52 Blood sugars low due to difficulty eating post tooth extractions.   Insulin held. Will continue to hold NPH is less than 50% consumed.   Resume sliding scale.     Z79.4    5. PVD (peripheral vascular disease) (H)  I73.9 Continues on plavix and ASA.    6. Coronary artery disease involving native coronary artery of native heart without angina pectoris  I25.10 See above. No CP or SOB.   Blood pressure satisfactory.      Voicemail left for his son Enrique in regards to plan of care.     This note has been dictated using voice recognition software. Any grammatical or context distortions are unintentional and inherent to the software    Electronically signed by: Barbara Stark CNP

## 2022-04-07 PROBLEM — Z76.89 POOR DENTITION REQUIRING REFERRAL TO DENTISTRY: Status: ACTIVE | Noted: 2022-01-01

## 2022-04-07 PROBLEM — M62.89 MUSCLE STIFFNESS: Status: ACTIVE | Noted: 2022-01-01

## 2022-04-07 PROBLEM — J69.0 ASPIRATION PNEUMONIA OF LEFT LOWER LOBE, UNSPECIFIED ASPIRATION PNEUMONIA TYPE (H): Status: ACTIVE | Noted: 2022-01-01

## 2022-04-07 NOTE — PROGRESS NOTES
[facility]    Code Status:  FULL CODE   Visit Type:   Chief Complaint   Patient presents with     Nursing Home Acute     TCU Follow up     Facility:  [facility] ([facility]) [[id number]]           History of Present Illness: Jhoan Curran is a 89 year old male who I am seeing today for follow-up on the TCU. Pt recently underwent tooth extractions on 4/1/22 with oral surgeon, 2/t to poor dentition. Patient recently hospitalized at [facility] on 3/12/2022.  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  Patient with a history of 3-day left knee pain with increased swelling and decreased range of motion.  Patient was unable to bear weight.  He underwent arthrocentesis which showed gram-positive cocci.  CRP and lactate were also both elevated.  Eliquis and Plavix held for his I&D.  Patient had a blood culture on 3/13 that became positive for gram-positive cocci.  Vascular surgeon and ID were consulted.  Patient started on vancomycin on 3/13 and underwent I&D of the left knee on 3/13.  On 3/17 patient complained of increased pain in the left lower extremity just above the knee to his upper thigh.  He had a venous Doppler ultrasound which was negative for DVT.  Patient high risk for endocarditis with a history of aortic stenosis.  Patient underwent VEE on 3/16.  Patient had repetitive coughing but no valvular vegetations were seen.  Patient was seen by speech therapy which recommended an esophagram.  There was concern for esophageal stricture being the cause of his dysphagia.  Exam showed only esophageal dysmotility.  Patient does have a multiple areas of tooth decay and infection and will need tooth extraction in the near future.  According  to the hospital records the patient's son is making arrangements for this.      On today's visit patient lying in bed.  I was phoned earlier this a.m. by nursing staff in regards to patient having some increased shortness of breath with increased respiratory rate.  He was afebrile.  Patient with recent tooth extractions of the lower gum.  He has had some difficulty chewing and swallowing.  Poor oral intake.  He is currently being treated for pyogenic arthritis of the left knee joint post I&D.  He continues on IV Rocephin until 4/11/2022.  This was extended out to 4/15/2022 for more tooth extraction spine in the future.  On today's visit he is lying in bed.  He is currently afebrile.  Shallow respiratory effort.  He does have some crackles in the lower lobes.  Chest x-ray reviewed which showed a left lower lobe pneumonia most likely aspiration pneumonia.  Very poor oral intake today.  I did downgrade his diet to a soft diet.  Have asked speech therapy to evaluate.  We will hold his insulin for today.  ID notified in regards to developing aspiration pneumonia on IV antibiotics.  History of aortic valve stenosis on chronic anticoagulation with Xarelto.  The nurse manager did speak with patient's son today and up date on current prognosis laboratory and results of x-ray.      Active Ambulatory Problems     Diagnosis Date Noted     Foreskin does not retract 02/11/2015     Elevated troponin 02/15/2021     SOB (shortness of breath) 02/15/2021     Elevated brain natriuretic peptide (BNP) level 02/15/2021     PVD (peripheral vascular disease) (H) 05/03/2007     Coronary artery disease involving native coronary artery of native heart without angina pectoris 02/15/2021     Acute decompensated heart failure (H) 02/15/2021     Bilateral pleural effusion 02/15/2021     COVID-19 ruled out 02/15/2021     Overflow incontinence of urine 09/14/2018     Hyperlipidemia LDL goal <100      Diabetes mellitus, type 2 (H)       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) 05/03/2007     Gangrene of toe of right foot (H) 02/10/2021     Critical lower limb ischemia (H) 02/04/2021     Right bundle branch block 05/03/2007     Acute respiratory failure with hypoxia and hypercapnia (H) 02/15/2021     Pulmonary edema 02/15/2021     Cough 03/22/2022     Calculus of kidney 05/03/2007     Calcium pyrophosphate deposition disease (CPDD) of knee 03/14/2022     Benign neoplasm of colon 05/03/2007     Aortic stenosis 05/03/2007     Neurogenic claudication (H) 06/23/2015     Microalbuminuria due to type 2 diabetes mellitus (H) 02/09/2022     Mobitz (type) I (Wenckebach's) atrioventricular block 05/10/2021     Hyperlipidemia associated with type 2 diabetes mellitus (H) 05/03/2007     Hx of cardiac pacemaker 03/12/2022     History of amputation of great toe (H) 02/08/2022     Glaucoma 11/23/2007     Gastroesophageal reflux disease without esophagitis 11/23/2007     SILVANO (obstructive sleep apnea) 08/25/2011     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) 02/10/2021     Pyogenic arthritis of left knee joint (H) 03/12/2022     Type 2 diabetes mellitus with diabetic peripheral angiopathy and gangrene, with long-term current use of insulin (H) 06/22/2015     Vitamin D deficiency 03/02/2022     Pain 03/29/2022     Resolved Ambulatory Problems     Diagnosis Date Noted     No Resolved Ambulatory Problems     Past Medical History:   Diagnosis Date     Atherosclerosis of right lower extremity with gangrene (H)      CAD (coronary artery disease)      HTN (hypertension)      Overflow incontinence      PAD (peripheral artery disease) (H)      RBBB        Current Outpatient Medications:      doxycycline hyclate (VIBRAMYCIN) 100 MG capsule, Take 100 mg by mouth 2 times daily, Disp: , Rfl:      acetaminophen (TYLENOL) 500 MG tablet, Take 1,000 mg by mouth 3 times daily , Disp: , Rfl:      apixaban ANTICOAGULANT (ELIQUIS) 2.5 MG tablet, Take  5 mg by mouth 2 times daily, Disp: , Rfl:      aspirin (ASA) 81 MG EC tablet, Take 1 tablet (81 mg) by mouth daily, Disp: 30 tablet, Rfl: 0     carvedilol (COREG) 6.25 MG tablet, Take 0.5 tablets (3.125 mg) by mouth 2 times daily (with meals), Disp: 30 tablet, Rfl: 0     cefTRIAXone (ROCEPHIN) 2 GM IV, Inject 2 g into the vein every 24 hours, Disp: , Rfl:      clopidogrel (PLAVIX) 75 MG tablet, Take 75 mg by mouth daily , Disp: , Rfl:      HYDROmorphone (DILAUDID) 2 MG tablet, Take 0.5 tablets (1 mg) by mouth every 6 hours as needed, Disp: 20 tablet, Rfl: 0     insulin regular (HUMULIN R VIAL) 100 UNIT/ML vial, Inject 2 Units Subcutaneous 2 times daily Also inject Per Sliding Scale; If Blood Sugar is 150 to 199, give 1 Units. If Blood Sugar is 200 to 249, give 2 Units. If Blood Sugar is 250 to 299, give 3 Units. If Blood Sugar is 300 to 349, give 4 Units. If Blood Sugar is 350 to 399, give 5 Units. If Blood Sugar is greater than 399, give 6 Units. If Blood Sugar is greater than 399, call MD. Subcutaneous Before Meals and At Bedtime, Disp: , Rfl:      methocarbamol (ROBAXIN) 500 MG tablet, Take 250 mg by mouth 2 times daily, Disp: , Rfl:      NOVOLIN N RELION 100 UNIT/ML susp, 28 Units , Disp: , Rfl:      Simvastatin (ZOCOR PO), Take 40 mg by mouth At Bedtime , Disp: , Rfl:      tamsulosin (FLOMAX) 0.4 MG capsule, Take 0.4 mg by mouth daily , Disp: , Rfl:   Allergies   Allergen Reactions     Iodine      nausea     Statin Drugs [Hmg-Coa-R Inhibitors]      cough     Atorvastatin Other (See Comments) and Muscle Pain (Myalgia)     Body aches     Contrast Dye Nausea and Vomiting     Lisinopril Other (See Comments) and Cough     Cough         All Meds and Allergies reviewed in the record at the facility and is the most up-to-date    REVIEW OF SYSTEMS:   Review of Systems  No fevers or chills. No headache, lightheadedness or dizziness. + SOB, no chest pains or palpitations. Appetite is poor since recent tooth extraction..  "No nausea, vomiting, constipation or diarrhea. No dysuria, frequency, burning or pain with urination.  Pain in his left knee controlled with Dilaudid and Tylenol. Muscle pain in the left quad, hamstring, groin and SI joint improving with muscle relaxer-however patient reports today this is improving.  Poor dentition with difficulty chewing post tooth extractions.  Otherwise review of systems are negative.     PHYSICAL EXAMINATION:  Physical Exam     Vital signs: /72   Pulse 74   Temp (!) 96  F (35.6  C)   Resp 18   Ht 1.702 m (5' 7\")   Wt 87.7 kg (193 lb 6.4 oz)   SpO2 97%   BMI 30.29 kg/m    General: Awake, Alert, oriented x3,  follows simple commands  HEENT: Pink conjunctiva, anicteric sclerae, moist oral mucosa. Bruising to face and mandible region from recent tooth extractions.   NECK: Supple  CVS:  S1  S2, without murmur or gallop.   LUNG: Somewhat diminished with crackles.  Occasional cough noted.  BACK: No kyphosis of the thoracic spine  ABDOMEN: Soft, nontender to palpation, with positive bowel sounds  EXTREMITIES: Moves both upper and lower extremities, with some limitation of the left knee.  Continues with a small amount of edema.  No redness or warmth.  Difficulty with full extension.   SKIN: Warm and dry  NEUROLOGIC: Intact, pulses palpable  PSYCHIATRIC: Flat affect.      Labs:  All labs reviewed in the nursing home record and Epic   @  Lab Results   Component Value Date    WBC 10.5 03/21/2022    WBC 8.2 02/18/2021     Lab Results   Component Value Date    RBC 3.68 03/21/2022    RBC 3.96 02/18/2021     Lab Results   Component Value Date    HGB 10.8 03/21/2022    HGB 12.4 02/18/2021     Lab Results   Component Value Date    HCT 33.8 03/21/2022    HCT 36.8 02/18/2021     Lab Results   Component Value Date    MCV 92 03/21/2022    MCV 93 02/18/2021     Lab Results   Component Value Date    MCH 29.3 03/21/2022    MCH 31.3 02/18/2021     Lab Results   Component Value Date    MCHC 32.0 03/21/2022 "    MCHC 33.7 02/18/2021     Lab Results   Component Value Date    RDW 13.2 03/21/2022    RDW 14.0 02/18/2021     Lab Results   Component Value Date     03/21/2022     02/18/2021        @Last Comprehensive Metabolic Panel:  Sodium   Date Value Ref Range Status   04/06/2022 132 (L) 136 - 145 mmol/L Final   02/18/2021 138 133 - 144 mmol/L Final     Potassium   Date Value Ref Range Status   04/06/2022 4.8 3.5 - 5.0 mmol/L Final   02/18/2021 3.7 3.4 - 5.3 mmol/L Final     Chloride   Date Value Ref Range Status   04/06/2022 99 98 - 107 mmol/L Final   02/18/2021 101 94 - 109 mmol/L Final     Carbon Dioxide   Date Value Ref Range Status   02/18/2021 33 (H) 20 - 32 mmol/L Final     Carbon Dioxide (CO2)   Date Value Ref Range Status   04/06/2022 21 (L) 22 - 31 mmol/L Final     Anion Gap   Date Value Ref Range Status   04/06/2022 12 5 - 18 mmol/L Final   02/18/2021 4 3 - 14 mmol/L Final     Glucose   Date Value Ref Range Status   04/06/2022 174 (H) 70 - 125 mg/dL Final   02/18/2021 159 (H) 70 - 99 mg/dL Final     Urea Nitrogen   Date Value Ref Range Status   04/06/2022 12 8 - 28 mg/dL Final   02/18/2021 19 7 - 30 mg/dL Final     Creatinine   Date Value Ref Range Status   04/06/2022 0.94 0.70 - 1.30 mg/dL Final   02/18/2021 0.99 0.66 - 1.25 mg/dL Final     GFR Estimate   Date Value Ref Range Status   04/06/2022 77 >60 mL/min/1.73m2 Final     Comment:     Effective December 21, 2021 eGFRcr in adults is calculated using the 2021 CKD-EPI creatinine equation which includes age and gender (Dirk graham al., NEJ, DOI: 10.1056/LPVHyu5683466)   02/18/2021 67 >60 mL/min/[1.73_m2] Final     Comment:     Non  GFR Calc  Starting 12/18/2018, serum creatinine based estimated GFR (eGFR) will be   calculated using the Chronic Kidney Disease Epidemiology Collaboration   (CKD-EPI) equation.       Calcium   Date Value Ref Range Status   04/06/2022 8.1 (L) 8.5 - 10.5 mg/dL Final   02/18/2021 8.8 8.5 - 10.1 mg/dL Final          Assessment/Plan:    ICD-10-CM    1. Pyogenic arthritis of left knee joint, due to unspecified organism (H)  M00.9 Continue on Rocephin Q 24 hours until 4/15/2022.  Antibiotic extended due to repeat tooth extractions on 4/13/2022.  CBC, ESR and CRP obtained today.  No elevated white count.  Sed rate and CRP improved from previous.  Pain controlled with tylenol and diluadid.   Patient unable to tolerate 100% extension.  Hamstring tightness as well as IT band tightness improving with Methocarbamol. Therapy continues to offer pain modalities.    2. Poor dentition requiring referral to dentistry  Z76.89 Recent lower gum tooth extractions.   Plans to undergo top, tooth extractions on 4/13/22.   Antibiotics extended until 4/15/2022.   3.   Aspiration pneumonia   chest x-ray obtained.  Left lower lobe pneumonia with infiltrate.  Most likely aspiration.  Message left for infectious disease.  Add doxycycline 100 mg p.o. twice daily until infectious disease returns call.  Currently afebrile.  Speech therapy to evaluate and treat.  Downgrade diet to soft diet.       4. Aortic valve stenosis, etiology of cardiac valve disease unspecified  I35.0 Anticoagulants resumed post tooth extractions. They will need to held again 2 days prior to 4/13/22.    4. Type 2 diabetes mellitus with diabetic peripheral angiopathy and gangrene, with long-term current use of insulin (H)  E11.52 Blood sugars low due to difficulty eating post tooth extractions.   Okay to hold insulin this evening secondary to poor oral intake.  continue to hold NPH is less than 50% consumed.   Resume sliding scale.          This note has been dictated using voice recognition software. Any grammatical or context distortions are unintentional and inherent to the software    Electronically signed by: Barbara Stark, CNP

## 2022-04-08 NOTE — PROGRESS NOTES
[facility]    Code Status:  FULL CODE   Visit Type:   Chief Complaint   Patient presents with     Nursing Home Acute     TCU Follow up     Facility:  [facility] ([facility]) [[id number]]           History of Present Illness: Jhoan Curran is a 89 year old male who I am seeing today for follow-up on the TCU. Pt recently underwent tooth extractions on 4/1/22 with oral surgeon, 2/t to poor dentition. Patient recently hospitalized at [facility] on 3/12/2022.  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  Patient with a history of 3-day left knee pain with increased swelling and decreased range of motion.  Patient was unable to bear weight.  He underwent arthrocentesis which showed gram-positive cocci.  CRP and lactate were also both elevated.  Eliquis and Plavix held for his I&D.  Patient had a blood culture on 3/13 that became positive for gram-positive cocci.  Vascular surgeon and ID were consulted.  Patient started on vancomycin on 3/13 and underwent I&D of the left knee on 3/13.  On 3/17 patient complained of increased pain in the left lower extremity just above the knee to his upper thigh.  He had a venous Doppler ultrasound which was negative for DVT.  Patient high risk for endocarditis with a history of aortic stenosis.  Patient underwent VEE on 3/16.  Patient had repetitive coughing but no valvular vegetations were seen.  Patient was seen by speech therapy which recommended an esophagram.  There was concern for esophageal stricture being the cause of his dysphagia.  Exam showed only esophageal dysmotility.  Patient does have a multiple areas of tooth decay and infection and will need tooth extraction in the near future.  According  to the hospital records the patient's son is making arrangements for this.      On today's visit patient lying in bed. Pt with increased SOB yesterday. CXR obtained which showed left lower lobe pneumonia with infiltrate. He continues on IV Rocephin for pyogenic arthritis of the left knee joint status post I&D.  He was placed on oral doxycycline last evening.  Today he was given his first dose of Lasix.  No hypoxia noted.  Occasional cough.  He has been followed by the speech therapist.  He recently underwent multiple tooth extractions on the bottom gum.  He has had some difficulty chewing.  His diet was downgraded to soft diet.  Today it was downgraded to puréed diet.  Appetite has varied.  However look back in the record he has been eating 50 to 75%.  Today he reports no appetite.  He is currently afebrile.  During hospitalization it did show he was having some regurgitation with swallowing with concern for esophageal stricture/esophageal dysmotility.  Patient continues on protein supplement.  Underlying diabetes.  Order was given to hold insulin if less than 50% of meal consumed.  Blood sugars fluctuating. Aortic valve stenosis on chronic anticoagulation with Xarelto.  Today I spoke with his son Enrique in regards to current x-ray results, laboratory and treatment.  Patient is being followed by infectious disease.  They are agreeable to continuing with IV Rocephin and doxycycline for coverage.      Active Ambulatory Problems     Diagnosis Date Noted     Foreskin does not retract 02/11/2015     Elevated troponin 02/15/2021     SOB (shortness of breath) 02/15/2021     Elevated brain natriuretic peptide (BNP) level 02/15/2021     PVD (peripheral vascular disease) (H) 05/03/2007     Coronary artery disease involving native coronary artery of native heart without angina pectoris 02/15/2021     Acute decompensated heart failure (H) 02/15/2021     Bilateral pleural effusion 02/15/2021     COVID-19 ruled out 02/15/2021      Overflow incontinence of urine 09/14/2018     Hyperlipidemia LDL goal <100      Diabetes mellitus, type 2 (H)      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) 05/03/2007     Gangrene of toe of right foot (H) 02/10/2021     Critical lower limb ischemia (H) 02/04/2021     Right bundle branch block 05/03/2007     Acute respiratory failure with hypoxia and hypercapnia (H) 02/15/2021     Pulmonary edema 02/15/2021     Cough 03/22/2022     Calculus of kidney 05/03/2007     Calcium pyrophosphate deposition disease (CPDD) of knee 03/14/2022     Benign neoplasm of colon 05/03/2007     Aortic valve stenosis, etiology of cardiac valve disease unspecified 05/03/2007     Neurogenic claudication (H) 06/23/2015     Microalbuminuria due to type 2 diabetes mellitus (H) 02/09/2022     Mobitz (type) I (Wenckebach's) atrioventricular block 05/10/2021     Hyperlipidemia associated with type 2 diabetes mellitus (H) 05/03/2007     Hx of cardiac pacemaker 03/12/2022     History of amputation of great toe (H) 02/08/2022     Glaucoma 11/23/2007     Gastroesophageal reflux disease without esophagitis 11/23/2007     SILVANO (obstructive sleep apnea) 08/25/2011     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) 02/10/2021     Pyogenic arthritis of left knee joint (H) 03/12/2022     Type 2 diabetes mellitus with diabetic peripheral angiopathy and gangrene, with long-term current use of insulin (H) 06/22/2015     Vitamin D deficiency 03/02/2022     Pain 03/29/2022     Muscle stiffness 04/07/2022     Poor dentition requiring referral to dentistry 04/07/2022     Aspiration pneumonia of left lower lobe, unspecified aspiration pneumonia type (H) 04/07/2022     Resolved Ambulatory Problems     Diagnosis Date Noted     No Resolved Ambulatory Problems     Past Medical History:   Diagnosis Date     Aortic stenosis      Atherosclerosis of right lower extremity with gangrene (H)      CAD (coronary artery disease)      HTN  (hypertension)      Overflow incontinence      PAD (peripheral artery disease) (H)      RBBB        Current Outpatient Medications:      furosemide (LASIX) 20 MG tablet, Take 20 mg by mouth daily, Disp: , Rfl:      acetaminophen (TYLENOL) 500 MG tablet, Take 1,000 mg by mouth 3 times daily , Disp: , Rfl:      apixaban ANTICOAGULANT (ELIQUIS) 2.5 MG tablet, Take 5 mg by mouth 2 times daily, Disp: , Rfl:      aspirin (ASA) 81 MG EC tablet, Take 1 tablet (81 mg) by mouth daily, Disp: 30 tablet, Rfl: 0     carvedilol (COREG) 6.25 MG tablet, Take 0.5 tablets (3.125 mg) by mouth 2 times daily (with meals), Disp: 30 tablet, Rfl: 0     cefTRIAXone (ROCEPHIN) 2 GM IV, Inject 2 g into the vein every 24 hours, Disp: , Rfl:      clopidogrel (PLAVIX) 75 MG tablet, Take 75 mg by mouth daily , Disp: , Rfl:      doxycycline hyclate (VIBRAMYCIN) 100 MG capsule, Take 100 mg by mouth 2 times daily, Disp: , Rfl:      HYDROmorphone (DILAUDID) 2 MG tablet, Take 0.5 tablets (1 mg) by mouth every 6 hours as needed for moderate to severe pain, Disp: 20 tablet, Rfl: 0     insulin regular (HUMULIN R VIAL) 100 UNIT/ML vial, Inject 2 Units Subcutaneous 2 times daily Also inject Per Sliding Scale; If Blood Sugar is 150 to 199, give 1 Units. If Blood Sugar is 200 to 249, give 2 Units. If Blood Sugar is 250 to 299, give 3 Units. If Blood Sugar is 300 to 349, give 4 Units. If Blood Sugar is 350 to 399, give 5 Units. If Blood Sugar is greater than 399, give 6 Units. If Blood Sugar is greater than 399, call MD. Subcutaneous Before Meals and At Bedtime, Disp: , Rfl:      methocarbamol (ROBAXIN) 500 MG tablet, Take 250 mg by mouth 2 times daily, Disp: , Rfl:      NOVOLIN N RELION 100 UNIT/ML susp, 28 Units , Disp: , Rfl:      Simvastatin (ZOCOR PO), Take 40 mg by mouth At Bedtime , Disp: , Rfl:      tamsulosin (FLOMAX) 0.4 MG capsule, Take 0.4 mg by mouth daily , Disp: , Rfl:   Allergies   Allergen Reactions     Iodine      nausea     Statin Drugs  "[Hmg-Coa-R Inhibitors]      cough     Atorvastatin Other (See Comments) and Muscle Pain (Myalgia)     Body aches     Contrast Dye Nausea and Vomiting     Lisinopril Other (See Comments) and Cough     Cough         All Meds and Allergies reviewed in the record at the facility and is the most up-to-date    REVIEW OF SYSTEMS:   Review of Systems  No fevers or chills. No headache, lightheadedness or dizziness. + SOB, no chest pains or palpitations. Appetite is poor since recent tooth extraction with variations in appetite.  + Dysphagia.  No nausea, vomiting, constipation or diarrhea. No dysuria, frequency, burning or pain with urination.  Pain in his left knee controlled with Dilaudid and Tylenol. Muscle pain in the left quad, hamstring, groin and SI joint improving with muscle relaxer.   Poor dentition with difficulty chewing post tooth extractions.  Otherwise review of systems are negative.     PHYSICAL EXAMINATION:   Physical Exam     Vital signs: /68   Pulse 99   Temp 97.5  F (36.4  C)   Resp 20   Ht 1.702 m (5' 7\")   Wt 90.2 kg (198 lb 12.8 oz)   SpO2 99%   BMI 31.14 kg/m    General: Awake, Alert, oriented x3,  follows simple commands  HEENT: Pink conjunctiva, anicteric sclerae, moist oral mucosa. Bruising to face and mandible region from recent tooth extractions.  No bleeding from the bottom gumline.  NECK: Supple  CVS:  S1  S2, without murmur or gallop.   LUNG: Somewhat diminished with crackles.  Increased respiratory rate.  Occasional cough noted.  BACK: No kyphosis of the thoracic spine  ABDOMEN: Soft, nontender to palpation, with positive bowel sounds  EXTREMITIES: Moves both upper and lower extremities, with some limitation of the left knee.  Trace edema.No redness or warmth.  Better extension of the left lower extremity on today's visit however continues with quad tightness.  No redness or warmth of the left knee.  Slight swelling.  SKIN: Warm and dry  NEUROLOGIC: Intact, pulses " palpable  PSYCHIATRIC: Flat affect.      Labs:  All labs reviewed in the nursing home record and Epic   @  Lab Results   Component Value Date    WBC 10.5 03/21/2022    WBC 8.2 02/18/2021     Lab Results   Component Value Date    RBC 3.68 03/21/2022    RBC 3.96 02/18/2021     Lab Results   Component Value Date    HGB 10.8 03/21/2022    HGB 12.4 02/18/2021     Lab Results   Component Value Date    HCT 33.8 03/21/2022    HCT 36.8 02/18/2021     Lab Results   Component Value Date    MCV 92 03/21/2022    MCV 93 02/18/2021     Lab Results   Component Value Date    MCH 29.3 03/21/2022    MCH 31.3 02/18/2021     Lab Results   Component Value Date    MCHC 32.0 03/21/2022    MCHC 33.7 02/18/2021     Lab Results   Component Value Date    RDW 13.2 03/21/2022    RDW 14.0 02/18/2021     Lab Results   Component Value Date     03/21/2022     02/18/2021        @Last Comprehensive Metabolic Panel:  Sodium   Date Value Ref Range Status   04/06/2022 132 (L) 136 - 145 mmol/L Final   02/18/2021 138 133 - 144 mmol/L Final     Potassium   Date Value Ref Range Status   04/06/2022 4.8 3.5 - 5.0 mmol/L Final   02/18/2021 3.7 3.4 - 5.3 mmol/L Final     Chloride   Date Value Ref Range Status   04/06/2022 99 98 - 107 mmol/L Final   02/18/2021 101 94 - 109 mmol/L Final     Carbon Dioxide   Date Value Ref Range Status   02/18/2021 33 (H) 20 - 32 mmol/L Final     Carbon Dioxide (CO2)   Date Value Ref Range Status   04/06/2022 21 (L) 22 - 31 mmol/L Final     Anion Gap   Date Value Ref Range Status   04/06/2022 12 5 - 18 mmol/L Final   02/18/2021 4 3 - 14 mmol/L Final     Glucose   Date Value Ref Range Status   04/06/2022 174 (H) 70 - 125 mg/dL Final   02/18/2021 159 (H) 70 - 99 mg/dL Final     Urea Nitrogen   Date Value Ref Range Status   04/06/2022 12 8 - 28 mg/dL Final   02/18/2021 19 7 - 30 mg/dL Final     Creatinine   Date Value Ref Range Status   04/06/2022 0.94 0.70 - 1.30 mg/dL Final   02/18/2021 0.99 0.66 - 1.25 mg/dL Final      GFR Estimate   Date Value Ref Range Status   04/06/2022 77 >60 mL/min/1.73m2 Final     Comment:     Effective December 21, 2021 eGFRcr in adults is calculated using the 2021 CKD-EPI creatinine equation which includes age and gender (Dirk graham al., NEJ, DOI: 10.1056/XMDDbc9412937)   02/18/2021 67 >60 mL/min/[1.73_m2] Final     Comment:     Non  GFR Calc  Starting 12/18/2018, serum creatinine based estimated GFR (eGFR) will be   calculated using the Chronic Kidney Disease Epidemiology Collaboration   (CKD-EPI) equation.       Calcium   Date Value Ref Range Status   04/06/2022 8.1 (L) 8.5 - 10.5 mg/dL Final   02/18/2021 8.8 8.5 - 10.1 mg/dL Final         Assessment/Plan:    ICD-10-CM    1. Pyogenic arthritis of left knee joint, due to unspecified organism (H)  M00.9 Continue on Rocephin Q 24 hours until 4/15/2022.  Antibiotic extended due to repeat tooth extractions on 4/13/2022.  No leukocytosis.  CRP 1.5.  Sed rate 28.  Pain controlled with tylenol and diluadid.   Patient unable to tolerate 100% extension-however better on today's visit.  Hamstring tightness as well as IT band tightness improving with Methocarbamol. Therapy continues to offer pain modalities.    2. Poor dentition requiring referral to dentistry  Z76.89 Recent lower gum tooth extractions.   Plans to undergo top, tooth extractions on 4/13/22.   Antibiotics extended until 4/15/2022.   3.   Aspiration pneumonia   chest x-ray obtained.  Left lower lobe pneumonia with infiltrate.  Most likely aspiration.  Message left for infectious disease.  Doxycycline 100 mg p.o. twice daily added.  Speech therapy to evaluate and treat.  Previously on soft diet now downgraded to puréed.     4. Aortic valve stenosis, etiology of cardiac valve disease unspecified  I35.0 Anticoagulants resumed post tooth extractions. They will need to held again 2 days prior to 4/13/22.    5. Type 2 diabetes mellitus with diabetic peripheral angiopathy and gangrene,  with long-term current use of insulin (H)  E11.52 Blood sugars low due to difficulty eating post tooth extractions.  continue to hold NPH is less than 50% consumed.   Resume sliding scale.    6.   CHF   start Lasix 20 mg p.o. daily.  Daily weights.    BNP pending.         This note has been dictated using voice recognition software. Any grammatical or context distortions are unintentional and inherent to the software    Electronically signed by: Barbara Stark CNP

## 2022-04-12 NOTE — PROGRESS NOTES
[facility] GERIATRIC SERVICES    Code Status:  FULL CODE   Visit Type:   Chief Complaint   Patient presents with     Nursing Home Acute     TCU Follow up     Facility:  [facility] ([facility]) [[id number]]           History of Present Illness: Jhoan Curran is a 89 year old male who I am seeing today for follow-up on the TCU. Pt recently underwent tooth extractions on 4/1/22 with oral surgeon, 2/t to poor dentition. Patient recently hospitalized at [facility] on 3/12/2022.  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  Patient with a history of 3-day left knee pain with increased swelling and decreased range of motion.  Patient was unable to bear weight.  He underwent arthrocentesis which showed gram-positive cocci.  CRP and lactate were also both elevated.  Eliquis and Plavix held for his I&D.  Patient had a blood culture on 3/13 that became positive for gram-positive cocci.  Vascular surgeon and ID were consulted.  Patient started on vancomycin on 3/13 and underwent I&D of the left knee on 3/13.  On 3/17 patient complained of increased pain in the left lower extremity just above the knee to his upper thigh.  He had a venous Doppler ultrasound which was negative for DVT.  Patient high risk for endocarditis with a history of aortic stenosis.  Patient underwent VEE on 3/16.  Patient had repetitive coughing but no valvular vegetations were seen.  Patient was seen by speech therapy which recommended an esophagram.  There was concern for esophageal stricture being the cause of his dysphagia.  Exam showed only esophageal dysmotility.  Patient does have a multiple areas of tooth decay and infection and will need tooth extraction in the near future.   According to the hospital records the patient's son is making arrangements for this.      On today's visit patient lying in bed.  Patient initially admitted with pyogenic arthritis of the left knee joint status post I&D.  He had continued on IV Rocephin which was extended out to 4/15/2022 post multiple tooth extractions on the bottom gum.  He was to undergo more tooth extractions on the top on 4/13/2022.  His diet was downgraded to puréed.  He is continue to be followed by speech.  He does have a history of esophageal stricture/esophageal dysmotility.  Post tooth extractions he developed aspiration pneumonia.  Doxycycline was added to his regimen.  He is continued with some shortness of breath.  She does have underlying congestive heart failure.  Increasing edema and weight gain.  Patient started on Lasix with increase.  On today's visit his son Enrique is present.  He feels his father condition is worsening.  BNP today 1033 slightly decreased from 1196.  He does have some increased upper extremity edema.  Very wheezy throughout.  No hypoxia.  Sodium worsening now 129.  Kidney functions within normal limits.  CRP and sed rate trending downward.  Son feels his father should be hospitalized.  He does have underlying aortic valve stenosis on chronic anticoagulation with Xarelto.  Continues with poor oral intake.  He does have underlying diabetes.  We have been holding his insulin for less than 50% of meals consumed.  Patient very fatigued.      Active Ambulatory Problems     Diagnosis Date Noted     Foreskin does not retract 02/11/2015     Elevated troponin 02/15/2021     SOB (shortness of breath) 02/15/2021     Elevated brain natriuretic peptide (BNP) level 02/15/2021     PVD (peripheral vascular disease) (H) 05/03/2007     Coronary artery disease involving native coronary artery of native heart without angina pectoris 02/15/2021     Acute decompensated heart failure (H) 02/15/2021     Bilateral pleural effusion  02/15/2021     COVID-19 ruled out 02/15/2021     Overflow incontinence of urine 09/14/2018     Hyperlipidemia LDL goal <100      Diabetes mellitus, type 2 (H)      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) 05/03/2007     Gangrene of toe of right foot (H) 02/10/2021     Critical lower limb ischemia (H) 02/04/2021     Right bundle branch block 05/03/2007     Acute respiratory failure with hypoxia and hypercapnia (H) 02/15/2021     Pulmonary edema 02/15/2021     Cough 03/22/2022     Calculus of kidney 05/03/2007     Calcium pyrophosphate deposition disease (CPDD) of knee 03/14/2022     Benign neoplasm of colon 05/03/2007     Aortic valve stenosis, etiology of cardiac valve disease unspecified 05/03/2007     Neurogenic claudication (H) 06/23/2015     Microalbuminuria due to type 2 diabetes mellitus (H) 02/09/2022     Mobitz (type) I (Wenckebach's) atrioventricular block 05/10/2021     Hyperlipidemia associated with type 2 diabetes mellitus (H) 05/03/2007     Hx of cardiac pacemaker 03/12/2022     History of amputation of great toe (H) 02/08/2022     Glaucoma 11/23/2007     Gastroesophageal reflux disease without esophagitis 11/23/2007     SILVANO (obstructive sleep apnea) 08/25/2011     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) 02/10/2021     Pyogenic arthritis of left knee joint (H) 03/12/2022     Type 2 diabetes mellitus with diabetic peripheral angiopathy and gangrene, with long-term current use of insulin (H) 06/22/2015     Vitamin D deficiency 03/02/2022     Pain 03/29/2022     Muscle stiffness 04/07/2022     Poor dentition requiring referral to dentistry 04/07/2022     Aspiration pneumonia of left lower lobe, unspecified aspiration pneumonia type (H) 04/07/2022     Resolved Ambulatory Problems     Diagnosis Date Noted     No Resolved Ambulatory Problems     Past Medical History:   Diagnosis Date     Aortic stenosis      Atherosclerosis of right lower extremity with gangrene (H)       CAD (coronary artery disease)      HTN (hypertension)      Overflow incontinence      PAD (peripheral artery disease) (H)      RBBB        Current Outpatient Medications:      acetaminophen (TYLENOL) 500 MG tablet, Take 1,000 mg by mouth 3 times daily , Disp: , Rfl:      apixaban ANTICOAGULANT (ELIQUIS) 2.5 MG tablet, Take 5 mg by mouth 2 times daily, Disp: , Rfl:      aspirin (ASA) 81 MG EC tablet, Take 1 tablet (81 mg) by mouth daily, Disp: 30 tablet, Rfl: 0     carvedilol (COREG) 6.25 MG tablet, Take 0.5 tablets (3.125 mg) by mouth 2 times daily (with meals), Disp: 30 tablet, Rfl: 0     cefTRIAXone (ROCEPHIN) 2 GM IV, Inject 2 g into the vein every 24 hours, Disp: , Rfl:      clopidogrel (PLAVIX) 75 MG tablet, Take 75 mg by mouth daily , Disp: , Rfl:      doxycycline hyclate (VIBRAMYCIN) 100 MG capsule, Take 100 mg by mouth 2 times daily, Disp: , Rfl:      furosemide (LASIX) 20 MG tablet, Take 20 mg by mouth daily, Disp: , Rfl:      HYDROmorphone (DILAUDID) 2 MG tablet, Take 0.5 tablets (1 mg) by mouth every 6 hours as needed for moderate to severe pain, Disp: 20 tablet, Rfl: 0     insulin regular (HUMULIN R VIAL) 100 UNIT/ML vial, Inject 2 Units Subcutaneous 2 times daily Also inject Per Sliding Scale; If Blood Sugar is 150 to 199, give 1 Units. If Blood Sugar is 200 to 249, give 2 Units. If Blood Sugar is 250 to 299, give 3 Units. If Blood Sugar is 300 to 349, give 4 Units. If Blood Sugar is 350 to 399, give 5 Units. If Blood Sugar is greater than 399, give 6 Units. If Blood Sugar is greater than 399, call MD. Subcutaneous Before Meals and At Bedtime, Disp: , Rfl:      methocarbamol (ROBAXIN) 500 MG tablet, Take 250 mg by mouth 2 times daily, Disp: , Rfl:      NOVOLIN N RELION 100 UNIT/ML susp, 28 Units , Disp: , Rfl:      Simvastatin (ZOCOR PO), Take 40 mg by mouth At Bedtime , Disp: , Rfl:      tamsulosin (FLOMAX) 0.4 MG capsule, Take 0.4 mg by mouth daily , Disp: , Rfl:   Allergies   Allergen Reactions  "    Iodine      nausea     Statin Drugs [Hmg-Coa-R Inhibitors]      cough     Atorvastatin Other (See Comments) and Muscle Pain (Myalgia)     Body aches     Contrast Dye Nausea and Vomiting     Lisinopril Other (See Comments) and Cough     Cough         All Meds and Allergies reviewed in the record at the facility and is the most up-to-date    REVIEW OF SYSTEMS:   Review of Systems  No fevers or chills. No headache, lightheadedness or dizziness. + SOB, no chest pains or palpitations. Appetite is poor since recent tooth extraction with variations in appetite.  + Dysphagia.  No nausea, vomiting, constipation or diarrhea. No dysuria, frequency, burning or pain with urination.  Pain in his left knee controlled with Dilaudid and Tylenol. Muscle pain in the left quad, hamstring, groin and SI joint improving with muscle relaxer.   Poor dentition with difficulty chewing post tooth extractions.  Otherwise review of systems are negative.     PHYSICAL EXAMINATION:   Physical Exam     Vital signs: /72   Pulse 52   Temp (!) 96.1  F (35.6  C)   Resp 18   Ht 1.702 m (5' 7\")   Wt 90.7 kg (200 lb)   SpO2 98%   BMI 31.32 kg/m    General: Awake, Alert, oriented x3,  follows simple commands  HEENT: Pink conjunctiva, anicteric sclerae, moist oral mucosa. Bruising to face and mandible region from recent tooth extractions.   NECK: Supple  CVS:  S1  S2, without murmur or gallop.   LUNG:  expiratory wheezing noted.  Dry cough.  BACK: No kyphosis of the thoracic spine  ABDOMEN: Soft, round, nontender to palpation, with positive bowel sounds  EXTREMITIES: Moves both upper and lower extremities, with some limitation of the left knee.  Trace edema. No redness or warmth of the left knee.   SKIN: Warm and dry  NEUROLOGIC: Intact, pulses palpable  PSYCHIATRIC: Flat affect.      Labs:  All labs reviewed in the nursing home record and [facility]   @  Lab Results   Component Value Date    WBC 10.5 03/21/2022    WBC 8.2 02/18/2021     Lab " Results   Component Value Date    RBC 3.68 03/21/2022    RBC 3.96 02/18/2021     Lab Results   Component Value Date    HGB 10.8 03/21/2022    HGB 12.4 02/18/2021     Lab Results   Component Value Date    HCT 33.8 03/21/2022    HCT 36.8 02/18/2021     Lab Results   Component Value Date    MCV 92 03/21/2022    MCV 93 02/18/2021     Lab Results   Component Value Date    MCH 29.3 03/21/2022    MCH 31.3 02/18/2021     Lab Results   Component Value Date    MCHC 32.0 03/21/2022    MCHC 33.7 02/18/2021     Lab Results   Component Value Date    RDW 13.2 03/21/2022    RDW 14.0 02/18/2021     Lab Results   Component Value Date     03/21/2022     02/18/2021        @Last Comprehensive Metabolic Panel:  Sodium   Date Value Ref Range Status   04/11/2022 129 (L) 136 - 145 mmol/L Final   02/18/2021 138 133 - 144 mmol/L Final     Potassium   Date Value Ref Range Status   04/11/2022 4.1 3.5 - 5.0 mmol/L Final   02/18/2021 3.7 3.4 - 5.3 mmol/L Final     Chloride   Date Value Ref Range Status   04/11/2022 94 (L) 98 - 107 mmol/L Final   02/18/2021 101 94 - 109 mmol/L Final     Carbon Dioxide   Date Value Ref Range Status   02/18/2021 33 (H) 20 - 32 mmol/L Final     Carbon Dioxide (CO2)   Date Value Ref Range Status   04/11/2022 25 22 - 31 mmol/L Final     Anion Gap   Date Value Ref Range Status   04/11/2022 10 5 - 18 mmol/L Final   02/18/2021 4 3 - 14 mmol/L Final     Glucose   Date Value Ref Range Status   04/11/2022 60 (L) 70 - 125 mg/dL Final   02/18/2021 159 (H) 70 - 99 mg/dL Final     Urea Nitrogen   Date Value Ref Range Status   04/11/2022 23 8 - 28 mg/dL Final   02/18/2021 19 7 - 30 mg/dL Final     Creatinine   Date Value Ref Range Status   04/11/2022 0.89 0.70 - 1.30 mg/dL Final   02/18/2021 0.99 0.66 - 1.25 mg/dL Final     GFR Estimate   Date Value Ref Range Status   04/11/2022 81 >60 mL/min/1.73m2 Final     Comment:     Effective December 21, 2021 eGFRcr in adults is calculated using the 2021 CKD-EPI creatinine  equation which includes age and gender (Dirk graham al., [place], DOI: 10.1056/TBAZwc5351114)   02/18/2021 67 >60 mL/min/[1.73_m2] Final     Comment:     Non  GFR Calc  Starting 12/18/2018, serum creatinine based estimated GFR (eGFR) will be   calculated using the Chronic Kidney Disease Epidemiology Collaboration   (CKD-EPI) equation.       Calcium   Date Value Ref Range Status   04/11/2022 8.7 8.5 - 10.5 mg/dL Final   02/18/2021 8.8 8.5 - 10.1 mg/dL Final         Assessment/Plan:    ICD-10-CM    1. Pyogenic arthritis of left knee joint, due to unspecified organism (H)  M00.9 Continue on Rocephin Q 24 hours until 4/15/2022.  Antibiotic extended due to repeat tooth extractions on 4/13/2022.  No leukocytosis.  CRP 0.8 down from 1.5.  Sed rate 8 down from 28.   Pain controlled with tylenol and diluadid.   Patient unable to tolerate 100% extension-however better on today's visit.  Hamstring tightness as well as IT band tightness improving with Methocarbamol. Therapy continues to offer pain modalities.    2. Poor dentition requiring referral to dentistry  Z76.89 Recent lower gum tooth extractions.   Plans to undergo top, tooth extractions on 4/13/22.   Antibiotics extended until 4/15/2022.   3.   Aspiration pneumonia   chest x-ray obtained.  Left lower lobe pneumonia with infiltrate.  Most likely aspiration.  Continues on Rocephin and recent addition of  Doxycycline 100 mg p.o. twice daily.   Previously on soft diet now downgraded to puréed.  Continues with speech therapy     4. Aortic valve stenosis, etiology of cardiac valve disease unspecified  I35.0 Anticoagulants resumed post tooth extractions. They will need to held again 2 days prior to 4/13/22.    5. Type 2 diabetes mellitus with diabetic peripheral angiopathy and gangrene, with long-term current use of insulin (H)  E11.52 Blood sugars low due to difficulty eating post tooth extractions.  continue to hold NPH is less than 50% consumed.   Resume sliding  scale.    6.   CHF   Lasix increased to 20 twice daily.  BNP slightly improved.     Okay to send to [facility] for evaluation at the request of the family.    Total time spent for this visit was 35 minutes greater than 50% of time spent face-to-face with patient and his son Enrique reviewing overall prognosis, treatments and follow-up laboratory.    This note has been dictated using voice recognition software. Any grammatical or context distortions are unintentional and inherent to the software    Electronically signed by: Barbara Stark CNP

## 2022-04-19 NOTE — PROGRESS NOTES
[facility] GERIATRIC SERVICES    Code Status:  FULL CODE   Visit Type:   Chief Complaint   Patient presents with     Hospital F/U     TCU Re-Admission     Facility:  [facility] ([facility]) [[id number]]           History of Present Illness: Jhoan Curran is a 89 year old male who I am seeing today for readmit to the TCU.  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  Patient recently hospitalized on 4/11/2022.  Patient was at the TCU recovering from septic pyrogenic arthritis of the left knee.  He had been receiving IV antibiotics.  He underwent tooth extraction on 4/1/2022 and developed aspiration pneumonia post extractions.  He was receiving IV antibiotics and doxycycline had been added to his regimen at the facility.  He does have a history of underlying congestive heart failure and developed acute on chronic congestive heart failure.  Patient son wanted him sent in.  He had been placed on oral diuretics.  He was hospitalized and treated for acute on chronic CHF.  On 4/7 BNP greater than 1100.  Upon admit to the ED BNP 1171.  Chest x-ray showed pulmonary opacities representing atelectasis versus pneumonia as well as trace bilateral effusions.  No fever or leukocytosis.  Patient given IV Zosyn.  He had elevated lactate at 2.4 and was given 500 mL of fluid.  He was treated with IV diuretic and transitioned to oral Lasix 40 mg twice daily.  He remained on room air.    Septic arthritis of the left knee.  Patient underwent arthrocentesis which showed gram-positive cocci.  3/13/2022. Blood culture on 3/13  positive for gram-positive cocci.  Vascular surgeon and ID were consulted.  Patient started on vancomycin on 3/13 and underwent I&D of the left knee  on 3/13.  On 3/17 patient complained of increased pain in the left lower extremity just above the knee to his upper thigh.  He had a venous Doppler ultrasound which was negative for DVT.  Patient high risk for endocarditis with a history of aortic stenosis.  Patient underwent VEE on 3/16.  During most recent hospitalization ID was consulted.  Last dose of ceftriaxone was on 4/15/2022.  Patient was switched to oral amoxicillin until seen by infectious disease on 4/25/2022.    Dysphagia.  Patient evaluated by speech therapy.  It was recommended he undergo esophagram.  There was concern for esophageal stricture because of his dysphagia.  Esophagram showed only esophageal dysmotility.  Patient currently on puréed diet secondary to recent tooth extractions.    Poor dentition with tooth extractions.  Patient underwent tooth extractions on 4/1/2022.  He was to undergo more tooth extractions on the top on 4/13/2022.     Aortic valve stenosis on chronic anticoagulation with Xarelto.     Diabetes mellitus type 2 chronically on insulin.  Insulin changed during recent hospitalization.    Today patient lying in bed.  He reports shortness of breath greatly improved.  Edema to lower extremities greatly improved.  Slight swelling of the left knee.  No redness or warmth.  He is able to fully extend at this time.  Blood sugar satisfactory.  Appetite fair.      Active Ambulatory Problems     Diagnosis Date Noted     Foreskin does not retract 02/11/2015     Elevated troponin 02/15/2021     SOB (shortness of breath) 02/15/2021     Elevated brain natriuretic peptide (BNP) level 02/15/2021     PVD (peripheral vascular disease) (H) 05/03/2007     Coronary artery disease involving native coronary artery of native heart without angina pectoris 02/15/2021     Acute decompensated heart failure (H) 02/15/2021     Bilateral pleural effusion 02/15/2021     COVID-19 ruled out 02/15/2021     Overflow incontinence of urine 09/14/2018      Hyperlipidemia LDL goal <100      Diabetes mellitus, type 2 (H)      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) 05/03/2007     Gangrene of toe of right foot (H) 02/10/2021     Critical lower limb ischemia (H) 02/04/2021     Right bundle branch block 05/03/2007     Acute respiratory failure with hypoxia and hypercapnia (H) 02/15/2021     Pulmonary edema 02/15/2021     Cough 03/22/2022     Calculus of kidney 05/03/2007     Calcium pyrophosphate deposition disease (CPDD) of knee 03/14/2022     Benign neoplasm of colon 05/03/2007     Aortic valve stenosis, etiology of cardiac valve disease unspecified 05/03/2007     Neurogenic claudication (H) 06/23/2015     Microalbuminuria due to type 2 diabetes mellitus (H) 02/09/2022     Mobitz (type) I (Wenckebach's) atrioventricular block 05/10/2021     Hyperlipidemia associated with type 2 diabetes mellitus (H) 05/03/2007     Hx of cardiac pacemaker 03/12/2022     History of amputation of great toe (H) 02/08/2022     Glaucoma 11/23/2007     Gastroesophageal reflux disease without esophagitis 11/23/2007     SILVANO (obstructive sleep apnea) 08/25/2011     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) 02/10/2021     Pyogenic arthritis of left knee joint (H) 03/12/2022     Type 2 diabetes mellitus with diabetic peripheral angiopathy and gangrene, with long-term current use of insulin (H) 06/22/2015     Vitamin D deficiency 03/02/2022     Pain 03/29/2022     Muscle stiffness 04/07/2022     Poor dentition requiring referral to dentistry 04/07/2022     Aspiration pneumonia of left lower lobe, unspecified aspiration pneumonia type (H) 04/07/2022     Resolved Ambulatory Problems     Diagnosis Date Noted     No Resolved Ambulatory Problems     Past Medical History:   Diagnosis Date     Aortic stenosis      Atherosclerosis of right lower extremity with gangrene (H)      CAD (coronary artery disease)      HTN (hypertension)      Overflow incontinence      PAD  (peripheral artery disease) (H)      RB        Current Outpatient Medications:      amoxicillin (AMOXIL) 500 MG capsule, Take 500 mg by mouth 3 times daily, Disp: , Rfl:      doxycycline hyclate (VIBRAMYCIN) 100 MG capsule, Take 100 mg by mouth, Disp: , Rfl:      insulin  UNIT/ML injection, Inject 14 Units Subcutaneous daily before breakfast, Disp: , Rfl:      insulin  UNIT/ML injection, Inject 5 Units Subcutaneous every evening Before dinner, Disp: , Rfl:      senna-docusate (SENOKOT-S/PERICOLACE) 8.6-50 MG tablet, Take 2 tablets by mouth 2 times daily, Disp: , Rfl:      acetaminophen (TYLENOL) 500 MG tablet, Take 1,000 mg by mouth 3 times daily , Disp: , Rfl:      apixaban ANTICOAGULANT (ELIQUIS) 2.5 MG tablet, Take 5 mg by mouth 2 times daily, Disp: , Rfl:      carvedilol (COREG) 6.25 MG tablet, Take 0.5 tablets (3.125 mg) by mouth 2 times daily (with meals), Disp: 30 tablet, Rfl: 0     clopidogrel (PLAVIX) 75 MG tablet, Take 75 mg by mouth daily , Disp: , Rfl:      furosemide (LASIX) 20 MG tablet, Take 40 mg by mouth 2 times daily, Disp: , Rfl:      HYDROmorphone (DILAUDID) 2 MG tablet, Take 0.5 tablets (1 mg) by mouth every 6 hours as needed for moderate to severe pain, Disp: 20 tablet, Rfl: 0     insulin regular (HUMULIN R VIAL) 100 UNIT/ML vial, Inject 2 Units Subcutaneous 2 times daily Also inject Per Sliding Scale; If Blood Sugar is 150 to 199, give 1 Units. If Blood Sugar is 200 to 249, give 2 Units. If Blood Sugar is 250 to 299, give 3 Units. If Blood Sugar is 300 to 349, give 4 Units. If Blood Sugar is 350 to 399, give 5 Units. If Blood Sugar is greater than 399, give 6 Units. If Blood Sugar is greater than 399, call MD. Subcutaneous Before Meals and At Bedtime, Disp: , Rfl:      methocarbamol (ROBAXIN) 500 MG tablet, Take 250 mg by mouth 2 times daily, Disp: , Rfl:      Simvastatin (ZOCOR PO), Take 40 mg by mouth At Bedtime , Disp: , Rfl:      tamsulosin (FLOMAX) 0.4 MG capsule, Take 0.4  "mg by mouth daily , Disp: , Rfl:   Allergies   Allergen Reactions     Iodine      nausea     Statin Drugs [Hmg-Coa-R Inhibitors]      cough     Atorvastatin Other (See Comments) and Muscle Pain (Myalgia)     Body aches     Contrast Dye Nausea and Vomiting     Lisinopril Other (See Comments) and Cough     Cough         All Meds and Allergies reviewed in the record at the facility and is the most up-to-date    REVIEW OF SYSTEMS:   Review of Systems  No fevers or chills. No headache, lightheadedness or dizziness. SOB improving, no chest pains or palpitations. Appetite fair.  + Dysphagia.  No nausea, vomiting, constipation or diarrhea. No dysuria, frequency, burning or pain with urination.  Pain in his left knee controlled with Dilaudid and Tylenol.Poor dentition with difficulty chewing post tooth extractions.     PHYSICAL EXAMINATION:   Physical Exam     Vital signs: /67   Pulse 82   Temp (!) 96.7  F (35.9  C)   Resp 18   Ht 1.702 m (5' 7\")   Wt 85.2 kg (187 lb 14.4 oz)   SpO2 94%   BMI 29.43 kg/m    General: Awake, Alert, oriented x3,  follows simple commands  HEENT: Pink conjunctiva, anicteric sclerae, moist oral mucosa.   NECK: Supple  CVS:  S1  S2, without murmur or gallop.   LUNG: No wheezes rales or rhonchi.  No cough on exam.  BACK: No kyphosis of the thoracic spine  ABDOMEN: Soft, round, nontender to palpation, with positive bowel sounds  EXTREMITIES: Moves both upper and lower extremities.  Now able to fully extend left knee.  No edema. No redness or warmth of the left knee.  Slight outpouching of the left knee.  SKIN: Warm and dry  NEUROLOGIC: Intact, pulses palpable  PSYCHIATRIC: Flat affect.      Labs:  All labs reviewed in the nursing home record and Epic   @  Lab Results   Component Value Date    WBC 10.5 03/21/2022    WBC 8.2 02/18/2021     Lab Results   Component Value Date    RBC 3.68 03/21/2022    RBC 3.96 02/18/2021     Lab Results   Component Value Date    HGB 10.8 03/21/2022    HGB " 12.4 02/18/2021     Lab Results   Component Value Date    HCT 33.8 03/21/2022    HCT 36.8 02/18/2021     Lab Results   Component Value Date    MCV 92 03/21/2022    MCV 93 02/18/2021     Lab Results   Component Value Date    MCH 29.3 03/21/2022    MCH 31.3 02/18/2021     Lab Results   Component Value Date    MCHC 32.0 03/21/2022    MCHC 33.7 02/18/2021     Lab Results   Component Value Date    RDW 13.2 03/21/2022    RDW 14.0 02/18/2021     Lab Results   Component Value Date     03/21/2022     02/18/2021        @Last Comprehensive Metabolic Panel:  Sodium   Date Value Ref Range Status   04/11/2022 129 (L) 136 - 145 mmol/L Final   02/18/2021 138 133 - 144 mmol/L Final     Potassium   Date Value Ref Range Status   04/11/2022 4.1 3.5 - 5.0 mmol/L Final   02/18/2021 3.7 3.4 - 5.3 mmol/L Final     Chloride   Date Value Ref Range Status   04/11/2022 94 (L) 98 - 107 mmol/L Final   02/18/2021 101 94 - 109 mmol/L Final     Carbon Dioxide   Date Value Ref Range Status   02/18/2021 33 (H) 20 - 32 mmol/L Final     Carbon Dioxide (CO2)   Date Value Ref Range Status   04/11/2022 25 22 - 31 mmol/L Final     Anion Gap   Date Value Ref Range Status   04/11/2022 10 5 - 18 mmol/L Final   02/18/2021 4 3 - 14 mmol/L Final     Glucose   Date Value Ref Range Status   04/11/2022 60 (L) 70 - 125 mg/dL Final   02/18/2021 159 (H) 70 - 99 mg/dL Final     Urea Nitrogen   Date Value Ref Range Status   04/11/2022 23 8 - 28 mg/dL Final   02/18/2021 19 7 - 30 mg/dL Final     Creatinine   Date Value Ref Range Status   04/11/2022 0.89 0.70 - 1.30 mg/dL Final   02/18/2021 0.99 0.66 - 1.25 mg/dL Final     GFR Estimate   Date Value Ref Range Status   04/11/2022 81 >60 mL/min/1.73m2 Final     Comment:     Effective December 21, 2021 eGFRcr in adults is calculated using the 2021 CKD-EPI creatinine equation which includes age and gender (Dirk et al., NEJM, DOI: 10.1056/KVYYub0133736)   02/18/2021 67 >60 mL/min/[1.73_m2] Final     Comment:      Non  GFR Calc  Starting 12/18/2018, serum creatinine based estimated GFR (eGFR) will be   calculated using the Chronic Kidney Disease Epidemiology Collaboration   (CKD-EPI) equation.       Calcium   Date Value Ref Range Status   04/11/2022 8.7 8.5 - 10.5 mg/dL Final   02/18/2021 8.8 8.5 - 10.1 mg/dL Final         Assessment/Plan:    ICD-10-CM    1.   Acute on chronic congestive heart failure   patient continues on Lasix 40 mg twice daily.  He is on a low-sodium diet.  Continue daily weights.   2. Pyogenic arthritis of left knee joint, due to unspecified organism (H)  M00.9  patient has completed his IM Rocephin.  He continues on amoxicillin until follow-up with infectious disease on 4/25/2022.  Repeat CBC and BMP on Thursday.   2. Poor dentition requiring referral to dentistry  Z76.89 Recent lower gum tooth extractions on 4/1/2022.  Family to make arrangements for follow-up of upper tooth extractions.  Continues on oral antibiotics.   3.   Aspiration pneumonia   continues on doxycycline.     4. Aortic valve stenosis, etiology of cardiac valve disease unspecified  I35.0  continues on Xarelto.   5. Type 2 diabetes mellitus with diabetic peripheral angiopathy and gangrene, with long-term current use of insulin (H)  E11.52  insulin change during hospitalization.  Continue to monitor blood sugars 4 times daily.     Okay for PT OT and speech eval and treat.      This note has been dictated using voice recognition software. Any grammatical or context distortions are unintentional and inherent to the software    Electronically signed by: Barbara Stark, CNP

## 2022-04-21 PROBLEM — I50.23 ACUTE ON CHRONIC SYSTOLIC CONGESTIVE HEART FAILURE (H): Status: ACTIVE | Noted: 2022-01-01

## 2022-04-21 PROBLEM — I48.91 ATRIAL FIBRILLATION (H): Status: ACTIVE | Noted: 2022-01-01

## 2022-04-21 PROBLEM — E87.1 HYPONATREMIA: Status: ACTIVE | Noted: 2022-01-01

## 2022-04-21 PROBLEM — I50.31 ACUTE DIASTOLIC CONGESTIVE HEART FAILURE (H): Status: ACTIVE | Noted: 2021-02-15

## 2022-04-22 NOTE — PROGRESS NOTES
[facility] GERIATRIC SERVICES    Code Status:  FULL CODE   Visit Type:   Chief Complaint   Patient presents with     Nursing Home Acute     TCU Follow up     Facility:  [facility] ([facility]) [[id number]]           History of Present Illness: Jhoan Curran is a 89 year old male who I am seeing today for follow up on the TCU.  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  Patient recently hospitalized on 4/11/2022.  Patient was at the TCU recovering from septic pyrogenic arthritis of the left knee.  He had been receiving IV antibiotics.  He underwent tooth extraction on 4/1/2022 and developed aspiration pneumonia post extractions.  He was receiving IV antibiotics and doxycycline had been added to his regimen at the facility.  He does have a history of underlying congestive heart failure and developed acute on chronic congestive heart failure.  Patient son wanted him sent in.  He had been placed on oral diuretics.  He was hospitalized and treated for acute on chronic CHF.  On 4/7 BNP greater than 1100.  Upon admit to the ED BNP 1171.  Chest x-ray showed pulmonary opacities representing atelectasis versus pneumonia as well as trace bilateral effusions.  No fever or leukocytosis.  Patient given IV Zosyn.  He had elevated lactate at 2.4 and was given 500 mL of fluid.  He was treated with IV diuretic and transitioned to oral Lasix 40 mg twice daily.  He remained on room air.    Today pt lying in bed.     Septic arthritis of the left knee.  Patient underwent arthrocentesis which showed gram-positive cocci.  3/13/2022. Blood culture on 3/13  positive for gram-positive cocci.  Vascular surgeon and ID were consulted.  Patient started on vancomycin on 3/13  and underwent I&D of the left knee on 3/13.  On 3/17 patient complained of increased pain in the left lower extremity just above the knee to his upper thigh.  He had a venous Doppler ultrasound which was negative for DVT.  Patient high risk for endocarditis with a history of aortic stenosis.  Patient underwent VEE on 3/16.  During most recent hospitalization ID was consulted.  Last dose of ceftriaxone was on 4/15/2022.  Patient was switched to oral amoxicillin until seen by infectious disease on 4/25/2022. Currently afebrile.     Dysphagia.  Patient evaluated by speech therapy.  It was recommended he undergo esophagram.  There was concern for esophageal stricture because of his dysphagia.  Esophagram showed only esophageal dysmotility.  Patient diet down graded to puréed diet secondary to recent tooth extractions. ST up graded diet to soft diet. Pt tolerating well.     Poor dentition with tooth extractions.  Patient underwent tooth extractions on 4/1/2022.  He was to undergo more tooth extractions on the top which is planned for 4/27/22. He will need his anticoagulation held 2 days prior.      Aortic valve stenosis on chronic anticoagulation with Xarelto.     Diabetes mellitus type 2 chronically on insulin.  Insulin changed during recent hospitalization. Review of blood sugars consistently in the 200s.         Active Ambulatory Problems     Diagnosis Date Noted     Foreskin does not retract 02/11/2015     Elevated troponin 02/15/2021     Shortness of breath 02/15/2021     Elevated brain natriuretic peptide (BNP) level 02/15/2021     PVD (peripheral vascular disease) (H) 05/03/2007     Coronary artery disease involving native coronary artery of native heart without angina pectoris 02/15/2021     Acute heart failure with preserved ejection fraction (HFpEF) (H) 02/15/2021     Bilateral pleural effusion 02/15/2021     COVID-19 ruled out 02/15/2021     Overflow incontinence of urine 09/14/2018     Hyperlipidemia LDL goal  <100      Diabetes mellitus, type 2 (H)      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) 05/03/2007     Gangrene of toe of right foot (H) 02/10/2021     Critical lower limb ischemia (H) 02/04/2021     Right bundle branch block 05/03/2007     Acute respiratory failure with hypoxia and hypercapnia (H) 02/15/2021     Pulmonary edema 02/15/2021     Cough 03/22/2022     Calculus of kidney 05/03/2007     Calcium pyrophosphate deposition disease (CPDD) of knee 03/14/2022     Benign neoplasm of colon 05/03/2007     Aortic valve stenosis, etiology of cardiac valve disease unspecified 05/03/2007     Neurogenic claudication (H) 06/23/2015     Microalbuminuria due to type 2 diabetes mellitus (H) 02/09/2022     Mobitz (type) I (Wenckebach's) atrioventricular block 05/10/2021     Hyperlipidemia associated with type 2 diabetes mellitus (H) 05/03/2007     Hx of cardiac pacemaker 03/12/2022     History of amputation of great toe (H) 02/08/2022     Glaucoma 11/23/2007     Gastroesophageal reflux disease without esophagitis 11/23/2007     SILVANO (obstructive sleep apnea) 08/25/2011     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) 02/10/2021     Pyogenic arthritis of left knee joint (H) 03/12/2022     Type 2 diabetes mellitus with diabetic peripheral angiopathy and gangrene, with long-term current use of insulin (H) 06/22/2015     Vitamin D deficiency 03/02/2022     Pain 03/29/2022     Muscle stiffness 04/07/2022     Poor dentition requiring referral to dentistry 04/07/2022     Aspiration pneumonia (H) 04/07/2022     Acute on chronic systolic congestive heart failure (H) 04/21/2022     Hyponatremia 04/11/2022     Atrial fibrillation (H) 04/11/2022     Resolved Ambulatory Problems     Diagnosis Date Noted     No Resolved Ambulatory Problems     Past Medical History:   Diagnosis Date     Aortic stenosis      Atherosclerosis of right lower extremity with gangrene (H)      CAD (coronary artery disease)      HTN  (hypertension)      Overflow incontinence      PAD (peripheral artery disease) (H)      RBBB        Current Outpatient Medications:      acetaminophen (TYLENOL) 500 MG tablet, Take 1,000 mg by mouth 3 times daily , Disp: , Rfl:      amoxicillin (AMOXIL) 500 MG capsule, Take 500 mg by mouth 3 times daily, Disp: , Rfl:      apixaban ANTICOAGULANT (ELIQUIS) 2.5 MG tablet, Take 5 mg by mouth 2 times daily, Disp: , Rfl:      carvedilol (COREG) 6.25 MG tablet, Take 0.5 tablets (3.125 mg) by mouth 2 times daily (with meals), Disp: 30 tablet, Rfl: 0     clopidogrel (PLAVIX) 75 MG tablet, Take 75 mg by mouth daily , Disp: , Rfl:      furosemide (LASIX) 20 MG tablet, Take 40 mg by mouth 2 times daily, Disp: , Rfl:      HYDROmorphone (DILAUDID) 2 MG tablet, Take 0.5 tablets (1 mg) by mouth every 6 hours as needed for moderate to severe pain, Disp: 20 tablet, Rfl: 0     insulin  UNIT/ML injection, Inject 14 Units Subcutaneous daily before breakfast, Disp: , Rfl:      insulin  UNIT/ML injection, Inject 5 Units Subcutaneous every evening Before dinner, Disp: , Rfl:      insulin regular (HUMULIN R VIAL) 100 UNIT/ML vial, Inject 2 Units Subcutaneous 2 times daily Also inject Per Sliding Scale; If Blood Sugar is 150 to 199, give 1 Units. If Blood Sugar is 200 to 249, give 2 Units. If Blood Sugar is 250 to 299, give 3 Units. If Blood Sugar is 300 to 349, give 4 Units. If Blood Sugar is 350 to 399, give 5 Units. If Blood Sugar is greater than 399, give 6 Units. If Blood Sugar is greater than 399, call MD. Subcutaneous Before Meals and At Bedtime, Disp: , Rfl:      methocarbamol (ROBAXIN) 500 MG tablet, Take 250 mg by mouth 2 times daily, Disp: , Rfl:      senna-docusate (SENOKOT-S/PERICOLACE) 8.6-50 MG tablet, Take 2 tablets by mouth 2 times daily, Disp: , Rfl:      Simvastatin (ZOCOR PO), Take 40 mg by mouth At Bedtime , Disp: , Rfl:      tamsulosin (FLOMAX) 0.4 MG capsule, Take 0.4 mg by mouth daily , Disp: , Rfl:  "  Allergies   Allergen Reactions     Iodine      nausea     Statin Drugs [Hmg-Coa-R Inhibitors]      cough     Atorvastatin Other (See Comments) and Muscle Pain (Myalgia)     Body aches     Contrast Dye Nausea and Vomiting     Lisinopril Other (See Comments) and Cough     Cough         All Meds and Allergies reviewed in the record at the facility and is the most up-to-date    REVIEW OF SYSTEMS:   Review of Systems  No fevers or chills. No headache, lightheadedness or dizziness. SOB improving, no chest pains or palpitations. Appetite fair.  + Dysphagia.  No nausea, vomiting, constipation or diarrhea. No dysuria, frequency, burning or pain with urination.  Pain in his left knee controlled with Tylenol.Poor dentition with difficulty chewing post tooth extractions.     PHYSICAL EXAMINATION:   Physical Exam     Vital signs: /73   Pulse 93   Temp 97  F (36.1  C)   Resp 20   Ht 1.702 m (5' 7\")   Wt 83.5 kg (184 lb)   SpO2 100%   BMI 28.82 kg/m    General: Awake, Alert, oriented x3,  follows simple commands  HEENT: Pink conjunctiva, anicteric sclerae, moist oral mucosa.   NECK: Supple  CVS:  S1  S2, without murmur or gallop.   LUNG: No wheezes rales or rhonchi.  No cough on exam.  BACK: No kyphosis of the thoracic spine  ABDOMEN: Soft, round, nontender to palpation, with positive bowel sounds  EXTREMITIES: Moves both upper and lower extremities.  Now able to fully extend left knee.  No edema. No redness or warmth of the left knee.  Slight outpouching of the left knee.  SKIN: Warm and dry  NEUROLOGIC: Intact, pulses palpable  PSYCHIATRIC: Flat affect.      Labs:  All labs reviewed in the nursing home record and Epic   @  Lab Results   Component Value Date    WBC 10.5 03/21/2022    WBC 8.2 02/18/2021     Lab Results   Component Value Date    RBC 3.68 03/21/2022    RBC 3.96 02/18/2021     Lab Results   Component Value Date    HGB 10.8 03/21/2022    HGB 12.4 02/18/2021     Lab Results   Component Value Date    HCT " 33.8 03/21/2022    HCT 36.8 02/18/2021     Lab Results   Component Value Date    MCV 92 03/21/2022    MCV 93 02/18/2021     Lab Results   Component Value Date    MCH 29.3 03/21/2022    MCH 31.3 02/18/2021     Lab Results   Component Value Date    MCHC 32.0 03/21/2022    MCHC 33.7 02/18/2021     Lab Results   Component Value Date    RDW 13.2 03/21/2022    RDW 14.0 02/18/2021     Lab Results   Component Value Date     03/21/2022     02/18/2021        @Last Comprehensive Metabolic Panel:  Sodium   Date Value Ref Range Status   04/19/2022 138 136 - 145 mmol/L Final   02/18/2021 138 133 - 144 mmol/L Final     Potassium   Date Value Ref Range Status   04/19/2022 3.6 3.5 - 5.0 mmol/L Final   02/18/2021 3.7 3.4 - 5.3 mmol/L Final     Chloride   Date Value Ref Range Status   04/19/2022 95 (L) 98 - 107 mmol/L Final   02/18/2021 101 94 - 109 mmol/L Final     Carbon Dioxide   Date Value Ref Range Status   02/18/2021 33 (H) 20 - 32 mmol/L Final     Carbon Dioxide (CO2)   Date Value Ref Range Status   04/19/2022 32 (H) 22 - 31 mmol/L Final     Anion Gap   Date Value Ref Range Status   04/19/2022 11 5 - 18 mmol/L Final   02/18/2021 4 3 - 14 mmol/L Final     Glucose   Date Value Ref Range Status   04/19/2022 132 (H) 70 - 125 mg/dL Final   02/18/2021 159 (H) 70 - 99 mg/dL Final     Urea Nitrogen   Date Value Ref Range Status   04/19/2022 22 8 - 28 mg/dL Final   02/18/2021 19 7 - 30 mg/dL Final     Creatinine   Date Value Ref Range Status   04/19/2022 0.97 0.70 - 1.30 mg/dL Final   02/18/2021 0.99 0.66 - 1.25 mg/dL Final     GFR Estimate   Date Value Ref Range Status   04/19/2022 74 >60 mL/min/1.73m2 Final     Comment:     Effective December 21, 2021 eGFRcr in adults is calculated using the 2021 CKD-EPI creatinine equation which includes age and gender (Dirk et al., NEJM, DOI: 10.1056/MPUSll2135227)   02/18/2021 67 >60 mL/min/[1.73_m2] Final     Comment:     Non  GFR Calc  Starting 12/18/2018, serum  creatinine based estimated GFR (eGFR) will be   calculated using the Chronic Kidney Disease Epidemiology Collaboration   (CKD-EPI) equation.       Calcium   Date Value Ref Range Status   04/19/2022 8.8 8.5 - 10.5 mg/dL Final   02/18/2021 8.8 8.5 - 10.1 mg/dL Final         Assessment/Plan:    ICD-10-CM    1.   Acute on chronic congestive heart failure   patient continues on Lasix 40 mg twice daily.  He is on a low-sodium diet.  Continue daily weights.   2. Pyogenic arthritis of left knee joint, due to unspecified organism (H)  M00.9  patient has completed his IM Rocephin.  He continues on amoxicillin until follow-up with infectious disease on 4/25/2022.  Repeat CBC and BMP on Monday.    2. Poor dentition requiring referral to dentistry  Z76.89 Recent lower gum tooth extractions on 4/1/2022.  Upper extractions planned for 4/27/22.   Anticoagulants to be held 2 days prior.   Continues on oral antibiotics.   3.   Aspiration pneumonia  He has completed his doxycyline.   Followed by ST. Diet up graded to soft diet.      4. Aortic valve stenosis, etiology of cardiac valve disease unspecified  I35.0  continues on Xarelto.   5. Type 2 diabetes mellitus with diabetic peripheral angiopathy and gangrene, with long-term current use of insulin (H)  E11.52  insulin change during hospitalization.  Continue to monitor blood sugars 4 times daily.  Blood sugars consistently elevated in the 200s. Increase NPH in the am to 18 units. Continues on NPH 5 units in the evening and sliding scale.            This note has been dictated using voice recognition software. Any grammatical or context distortions are unintentional and inherent to the software    Electronically signed by: Barbara Stark, CNP

## 2022-04-26 NOTE — PROGRESS NOTES
[facility] GERIATRIC SERVICES    Code Status:  FULL CODE   Visit Type:   Chief Complaint   Patient presents with     Nursing Home Acute     TCU Follow up     Facility:  [facility] ([facility]) [[id number]]           History of Present Illness: Jhoan Curran is a 89 year old male who I am seeing today for follow up on the TCU.  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  Patient recently hospitalized on 4/11/2022.  Patient was at the TCU recovering from septic pyrogenic arthritis of the left knee.  He had been receiving IV antibiotics.  He underwent tooth extraction on 4/1/2022 and developed aspiration pneumonia post extractions.  He was receiving IV antibiotics and doxycycline had been added to his regimen at the facility.  He does have a history of underlying congestive heart failure and developed acute on chronic congestive heart failure.  Patient son wanted him sent in.  He had been placed on oral diuretics.  He was hospitalized and treated for acute on chronic CHF.  On 4/7 BNP greater than 1100.  Upon admit to the ED BNP 1171.  Chest x-ray showed pulmonary opacities representing atelectasis versus pneumonia as well as trace bilateral effusions.  No fever or leukocytosis.  Patient given IV Zosyn.  He had elevated lactate at 2.4 and was given 500 mL of fluid.  He was treated with IV diuretic and transitioned to oral Lasix 40 mg twice daily.  He remained on room air.    Today pt lying in bed.     Septic arthritis of the left knee.  Patient underwent arthrocentesis which showed gram-positive cocci.  3/13/2022. Blood culture on 3/13  positive for gram-positive cocci.  Vascular surgeon and ID were consulted.  Patient started on vancomycin on 3/13  and underwent I&D of the left knee on 3/13.  On 3/17 patient complained of increased pain in the left lower extremity just above the knee to his upper thigh.  He had a venous Doppler ultrasound which was negative for DVT.  Patient high risk for endocarditis with a history of aortic stenosis.  Patient underwent VEE on 3/16.  During most recent hospitalization ID was consulted.  Last dose of ceftriaxone was on 4/15/2022.  Patient was switched to oral amoxicillin until seen by infectious disease on 4/25/2022.     Dysphagia.  Patient evaluated by speech therapy.  It was recommended he undergo esophagram.  There was concern for esophageal stricture because of his dysphagia.  Esophagram showed only esophageal dysmotility.  Patient diet down graded to puréed diet secondary to recent tooth extractions. ST up graded diet to soft diet. Pt tolerating well. No cough. He will need diet down graded post tooth extractions.     Poor dentition with tooth extractions.  Patient underwent tooth extractions on 4/1/2022.  He was to undergo more tooth extractions on the top which is planned for 4/27/22.  Anticoagulant and Plavix currently on hold.     Aortic valve stenosis on chronic anticoagulation with Xarelto.  Xarelto being held for upcoming tooth extractions.    Diabetes mellitus type 2 chronically on insulin.  Insulin changed during recent hospitalization.  Recent increase in his NPH.  Blood sugars slightly improved.        Active Ambulatory Problems     Diagnosis Date Noted     Foreskin does not retract 02/11/2015     Elevated troponin 02/15/2021     Shortness of breath 02/15/2021     Elevated brain natriuretic peptide (BNP) level 02/15/2021     PVD (peripheral vascular disease) (H) 05/03/2007     Coronary artery disease involving native coronary artery of native heart without angina pectoris 02/15/2021     Acute heart failure with preserved ejection fraction (HFpEF) (H) 02/15/2021     Bilateral pleural effusion 02/15/2021      COVID-19 ruled out 02/15/2021     Overflow incontinence of urine 09/14/2018     Hyperlipidemia LDL goal <100      Diabetes mellitus, type 2 (H)      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) 05/03/2007     Gangrene of toe of right foot (H) 02/10/2021     Critical lower limb ischemia (H) 02/04/2021     Right bundle branch block 05/03/2007     Acute respiratory failure with hypoxia and hypercapnia (H) 02/15/2021     Pulmonary edema 02/15/2021     Cough 03/22/2022     Calculus of kidney 05/03/2007     Calcium pyrophosphate deposition disease (CPDD) of knee 03/14/2022     Benign neoplasm of colon 05/03/2007     Aortic valve stenosis, etiology of cardiac valve disease unspecified 05/03/2007     Neurogenic claudication (H) 06/23/2015     Microalbuminuria due to type 2 diabetes mellitus (H) 02/09/2022     Mobitz (type) I (Wenckebach's) atrioventricular block 05/10/2021     Hyperlipidemia associated with type 2 diabetes mellitus (H) 05/03/2007     Hx of cardiac pacemaker 03/12/2022     History of amputation of great toe (H) 02/08/2022     Glaucoma 11/23/2007     Gastroesophageal reflux disease without esophagitis 11/23/2007     SILVANO (obstructive sleep apnea) 08/25/2011     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) 02/10/2021     Pyogenic arthritis of left knee joint (H) 03/12/2022     Type 2 diabetes mellitus with diabetic peripheral angiopathy and gangrene, with long-term current use of insulin (H) 06/22/2015     Vitamin D deficiency 03/02/2022     Pain 03/29/2022     Muscle stiffness 04/07/2022     Poor dentition requiring referral to dentistry 04/07/2022     Aspiration pneumonia (H) 04/07/2022     Acute on chronic systolic congestive heart failure (H) 04/21/2022     Hyponatremia 04/11/2022     Atrial fibrillation (H) 04/11/2022     Resolved Ambulatory Problems     Diagnosis Date Noted     No Resolved Ambulatory Problems     Past Medical History:   Diagnosis Date     Aortic stenosis       Atherosclerosis of right lower extremity with gangrene (H)      CAD (coronary artery disease)      HTN (hypertension)      Overflow incontinence      PAD (peripheral artery disease) (H)      RBBB        Current Outpatient Medications:      acetaminophen (TYLENOL) 500 MG tablet, Take 1,000 mg by mouth 3 times daily , Disp: , Rfl:      amoxicillin (AMOXIL) 500 MG capsule, Take 500 mg by mouth 3 times daily, Disp: , Rfl:      apixaban ANTICOAGULANT (ELIQUIS) 2.5 MG tablet, Take 5 mg by mouth 2 times daily On hold. Resume 4/27/22., Disp: , Rfl:      carvedilol (COREG) 6.25 MG tablet, Take 0.5 tablets (3.125 mg) by mouth 2 times daily (with meals), Disp: 30 tablet, Rfl: 0     clopidogrel (PLAVIX) 75 MG tablet, Take 75 mg by mouth daily On hold resume 4/27/22 after tooth extractions., Disp: , Rfl:      furosemide (LASIX) 20 MG tablet, Take 40 mg by mouth 2 times daily, Disp: , Rfl:      insulin  UNIT/ML injection, Inject 18 Units Subcutaneous daily before breakfast, Disp: , Rfl:      insulin  UNIT/ML injection, Inject 5 Units Subcutaneous every evening Before dinner, Disp: , Rfl:      insulin regular (HUMULIN R VIAL) 100 UNIT/ML vial, Inject 2 Units Subcutaneous 2 times daily Also inject Per Sliding Scale; If Blood Sugar is 150 to 199, give 1 Units. If Blood Sugar is 200 to 249, give 2 Units. If Blood Sugar is 250 to 299, give 3 Units. If Blood Sugar is 300 to 349, give 4 Units. If Blood Sugar is 350 to 399, give 5 Units. If Blood Sugar is greater than 399, give 6 Units. If Blood Sugar is greater than 399, call MD. Subcutaneous Before Meals and At Bedtime, Disp: , Rfl:      methocarbamol (ROBAXIN) 500 MG tablet, Take 250 mg by mouth daily, Disp: , Rfl:      senna-docusate (SENOKOT-S/PERICOLACE) 8.6-50 MG tablet, Take 2 tablets by mouth 2 times daily, Disp: , Rfl:      Simvastatin (ZOCOR PO), Take 40 mg by mouth At Bedtime , Disp: , Rfl:      tamsulosin (FLOMAX) 0.4 MG capsule, Take 0.4 mg by mouth daily  ", Disp: , Rfl:   Allergies   Allergen Reactions     Iodine      nausea     Statin Drugs [Hmg-Coa-R Inhibitors]      cough     Atorvastatin Other (See Comments) and Muscle Pain (Myalgia)     Body aches     Contrast Dye Nausea and Vomiting     Lisinopril Other (See Comments) and Cough     Cough         All Meds and Allergies reviewed in the record at the facility and is the most up-to-date    REVIEW OF SYSTEMS:   Review of Systems  No fevers or chills. No headache, lightheadedness or dizziness. SOB improving, no chest pains or palpitations. Appetite fair.  + Dysphagia.  No nausea, vomiting, constipation or diarrhea. No dysuria, frequency, burning or pain with urination.  Pain in his left knee controlled with Tylenol.Poor dentition with difficulty chewing post tooth extractions.     PHYSICAL EXAMINATION:   Physical Exam     Vital signs: /73   Pulse 86   Temp 97.5  F (36.4  C)   Resp 20   Ht 1.702 m (5' 7\")   Wt 81.3 kg (179 lb 3.2 oz)   SpO2 95%   BMI 28.07 kg/m    General: Awake, Alert, oriented x3,  follows simple commands  HEENT: Pink conjunctiva, anicteric sclerae, moist oral mucosa.   NECK: Supple  CVS:  S1  S2, without murmur or gallop.   LUNG: No wheezes rales or rhonchi.  No cough on exam.  BACK: No kyphosis of the thoracic spine  ABDOMEN: Soft, round, nontender to palpation, with positive bowel sounds  EXTREMITIES: Moves both upper and lower extremities.  Now able to fully extend left knee.  Trace lower extremity edema. No redness or warmth of the left knee.  Slight outpouching of the left knee.  SKIN: Warm and dry  NEUROLOGIC: Intact, pulses palpable  PSYCHIATRIC: Flat affect.      Labs:  All labs reviewed in the nursing home record and [facility]   @  Lab Results   Component Value Date    WBC 10.5 03/21/2022    WBC 8.2 02/18/2021     Lab Results   Component Value Date    RBC 3.68 03/21/2022    RBC 3.96 02/18/2021     Lab Results   Component Value Date    HGB 10.8 03/21/2022    HGB 12.4 02/18/2021 "     Lab Results   Component Value Date    HCT 33.8 03/21/2022    HCT 36.8 02/18/2021     Lab Results   Component Value Date    MCV 92 03/21/2022    MCV 93 02/18/2021     Lab Results   Component Value Date    MCH 29.3 03/21/2022    MCH 31.3 02/18/2021     Lab Results   Component Value Date    MCHC 32.0 03/21/2022    MCHC 33.7 02/18/2021     Lab Results   Component Value Date    RDW 13.2 03/21/2022    RDW 14.0 02/18/2021     Lab Results   Component Value Date     03/21/2022     02/18/2021        @Last Comprehensive Metabolic Panel:  Sodium   Date Value Ref Range Status   04/19/2022 138 136 - 145 mmol/L Final   02/18/2021 138 133 - 144 mmol/L Final     Potassium   Date Value Ref Range Status   04/19/2022 3.6 3.5 - 5.0 mmol/L Final   02/18/2021 3.7 3.4 - 5.3 mmol/L Final     Chloride   Date Value Ref Range Status   04/19/2022 95 (L) 98 - 107 mmol/L Final   02/18/2021 101 94 - 109 mmol/L Final     Carbon Dioxide   Date Value Ref Range Status   02/18/2021 33 (H) 20 - 32 mmol/L Final     Carbon Dioxide (CO2)   Date Value Ref Range Status   04/19/2022 32 (H) 22 - 31 mmol/L Final     Anion Gap   Date Value Ref Range Status   04/19/2022 11 5 - 18 mmol/L Final   02/18/2021 4 3 - 14 mmol/L Final     Glucose   Date Value Ref Range Status   04/19/2022 132 (H) 70 - 125 mg/dL Final   02/18/2021 159 (H) 70 - 99 mg/dL Final     Urea Nitrogen   Date Value Ref Range Status   04/19/2022 22 8 - 28 mg/dL Final   02/18/2021 19 7 - 30 mg/dL Final     Creatinine   Date Value Ref Range Status   04/19/2022 0.97 0.70 - 1.30 mg/dL Final   02/18/2021 0.99 0.66 - 1.25 mg/dL Final     GFR Estimate   Date Value Ref Range Status   04/19/2022 74 >60 mL/min/1.73m2 Final     Comment:     Effective December 21, 2021 eGFRcr in adults is calculated using the 2021 CKD-EPI creatinine equation which includes age and gender (Dirk et al., NEJM, DOI: 10.1056/QKMDwo2393044)   02/18/2021 67 >60 mL/min/[1.73_m2] Final     Comment:     Non   American GFR Calc  Starting 12/18/2018, serum creatinine based estimated GFR (eGFR) will be   calculated using the Chronic Kidney Disease Epidemiology Collaboration   (CKD-EPI) equation.       Calcium   Date Value Ref Range Status   04/19/2022 8.8 8.5 - 10.5 mg/dL Final   02/18/2021 8.8 8.5 - 10.1 mg/dL Final         Assessment/Plan:    ICD-10-CM    1.   Acute on chronic congestive heart failure   patient continues on Lasix 40 mg twice daily.  He is on a low-sodium diet.  Continue daily weights.  Follow-up BMP on Wednesday.   2. Pyogenic arthritis of left knee joint, due to unspecified organism (H)  M00.9  patient has completed his IM Rocephin.  He continues on amoxicillin until follow-up with infectious disease on 4/25/2022.  He should continue oral antibiotics until post tooth extractions.   2. Poor dentition requiring referral to dentistry  Z76.89 Recent lower gum tooth extractions on 4/1/2022.  Upper extractions planned for 4/27/22.   Xarelto and Plavix currently on hold.  Okay to resume post tooth extractions.  Continues on oral antibiotics.   3.   Aspiration pneumonia  He has completed his doxycyline.   Followed by ST. Diet up graded to soft diet.   He will need downgrade of his diet post tooth extractions temporarily.     4. Aortic valve stenosis, etiology of cardiac valve disease unspecified  I35.0  continues on Xarelto.   5. Type 2 diabetes mellitus with diabetic peripheral angiopathy and gangrene, with long-term current use of insulin (H)  E11.52  Recently NPH increased in the am to 18 units. Continues on NPH 5 units in the evening and sliding scale.  Continue to monitor 4 times daily.           This note has been dictated using voice recognition software. Any grammatical or context distortions are unintentional and inherent to the software    Electronically signed by: Barbara Stakr CNP

## 2022-04-28 NOTE — PROGRESS NOTES
[facility] GERIATRIC SERVICES    Code Status:  FULL CODE   Visit Type:   Chief Complaint   Patient presents with     Nursing Home Acute     TCU Follow up     Facility:  [facility] ([facility]) [[id number]]           History of Present Illness: Jhoan Curran is a 89 year old male who I am seeing today for follow up on the TCU.  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  Patient recently hospitalized on 4/11/2022.  Patient was at the TCU recovering from septic pyrogenic arthritis of the left knee.  He had been receiving IV antibiotics.  He underwent tooth extraction on 4/1/2022 and developed aspiration pneumonia post extractions.  He was receiving IV antibiotics and doxycycline had been added to his regimen at the facility.  He does have a history of underlying congestive heart failure and developed acute on chronic congestive heart failure.  Patient son wanted him sent in.  He had been placed on oral diuretics.  He was hospitalized and treated for acute on chronic CHF.  On 4/7 BNP greater than 1100.  Upon admit to the ED BNP 1171.  Chest x-ray showed pulmonary opacities representing atelectasis versus pneumonia as well as trace bilateral effusions.  No fever or leukocytosis.  Patient given IV Zosyn.  He had elevated lactate at 2.4 and was given 500 mL of fluid.  He was treated with IV diuretic and transitioned to oral Lasix 40 mg twice daily.  He remained on room air.    Today pt lying in bed.  He is just returned from his tooth extractions.  He has gauze in place with some moderate serosanguineous drainage.  Sutures noted.  Afebrile.  Continues on Tylenol for pain.    Poor dentition with tooth extractions.  Patient underwent lower tooth  extractions on 4/1/2022.  Today patient underwent upper tooth extractions.  Sutures in place.  Some serosanguineous drainage noted.  Anticoagulant and Plavix currently on hold.  Will resume tomorrow.     Dysphagia.  Patient evaluated by speech therapy.  It was recommended he undergo esophagram.  There was concern for esophageal stricture because of his dysphagia.  Esophagram showed only esophageal dysmotility.  Patient with a history of aspiration pneumonia post original tooth extractions.  Patient diet recently upgraded to mechanical soft however will resume puréed diet over the next several days.  Speech therapy to continue to follow with recommendations.    Septic arthritis of the left knee.  Patient underwent arthrocentesis which showed gram-positive cocci.  3/13/2022. Blood culture on 3/13  positive for gram-positive cocci.  Vascular surgeon and ID were consulted.  Patient started on vancomycin on 3/13 and underwent I&D of the left knee on 3/13.  On 3/17 patient complained of increased pain in the left lower extremity just above the knee to his upper thigh.  He had a venous Doppler ultrasound which was negative for DVT.  Patient high risk for endocarditis with a history of aortic stenosis.  Patient underwent VEE on 3/16.  During most recent hospitalization ID was consulted.  Last dose of ceftriaxone was on 4/15/2022.  Patient was switched to oral amoxicillin until seen by infectious disease on 4/25/2022.     Aortic valve stenosis on chronic anticoagulation with Xarelto.  Xarelto recently held for tooth extractions.    Diabetes mellitus type 2 chronically on insulin.  Insulin changed during recent hospitalization.  Recent increase in his NPH.  Blood sugars slightly improved.        Active Ambulatory Problems     Diagnosis Date Noted     Foreskin does not retract 02/11/2015     Elevated troponin 02/15/2021     Shortness of breath 02/15/2021     Elevated brain natriuretic peptide (BNP) level 02/15/2021     PVD  (peripheral vascular disease) (H) 05/03/2007     Coronary artery disease involving native coronary artery of native heart without angina pectoris 02/15/2021     Acute heart failure with preserved ejection fraction (HFpEF) (H) 02/15/2021     Bilateral pleural effusion 02/15/2021     COVID-19 ruled out 02/15/2021     Overflow incontinence of urine 09/14/2018     Hyperlipidemia LDL goal <100      Diabetes mellitus, type 2 (H)      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) 05/03/2007     Gangrene of toe of right foot (H) 02/10/2021     Critical lower limb ischemia (H) 02/04/2021     Right bundle branch block 05/03/2007     Acute respiratory failure with hypoxia and hypercapnia (H) 02/15/2021     Pulmonary edema 02/15/2021     Cough 03/22/2022     Calculus of kidney 05/03/2007     Calcium pyrophosphate deposition disease (CPDD) of knee 03/14/2022     Benign neoplasm of colon 05/03/2007     Aortic valve stenosis, etiology of cardiac valve disease unspecified 05/03/2007     Neurogenic claudication (H) 06/23/2015     Microalbuminuria due to type 2 diabetes mellitus (H) 02/09/2022     Mobitz (type) I (Wenckebach's) atrioventricular block 05/10/2021     Hyperlipidemia associated with type 2 diabetes mellitus (H) 05/03/2007     Hx of cardiac pacemaker 03/12/2022     History of amputation of great toe (H) 02/08/2022     Glaucoma 11/23/2007     Gastroesophageal reflux disease without esophagitis 11/23/2007     SILVANO (obstructive sleep apnea) 08/25/2011     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) 02/10/2021     Pyogenic arthritis of left knee joint (H) 03/12/2022     Type 2 diabetes mellitus with diabetic peripheral angiopathy and gangrene, with long-term current use of insulin (H) 06/22/2015     Vitamin D deficiency 03/02/2022     Pain 03/29/2022     Muscle stiffness 04/07/2022     Poor dentition requiring referral to dentistry 04/07/2022     Aspiration pneumonia (H) 04/07/2022     Acute on  chronic systolic congestive heart failure (H) 04/21/2022     Hyponatremia 04/11/2022     Atrial fibrillation (H) 04/11/2022     Resolved Ambulatory Problems     Diagnosis Date Noted     No Resolved Ambulatory Problems     Past Medical History:   Diagnosis Date     Aortic stenosis      Atherosclerosis of right lower extremity with gangrene (H)      CAD (coronary artery disease)      HTN (hypertension)      Overflow incontinence      PAD (peripheral artery disease) (H)      RBBB        Current Outpatient Medications:      acetaminophen (TYLENOL) 500 MG tablet, Take 1,000 mg by mouth 3 times daily , Disp: , Rfl:      amoxicillin (AMOXIL) 500 MG capsule, Take 500 mg by mouth 3 times daily, Disp: , Rfl:      apixaban ANTICOAGULANT (ELIQUIS) 2.5 MG tablet, Take 5 mg by mouth 2 times daily On hold. Resume 4/27/22., Disp: , Rfl:      carvedilol (COREG) 6.25 MG tablet, Take 0.5 tablets (3.125 mg) by mouth 2 times daily (with meals), Disp: 30 tablet, Rfl: 0     clopidogrel (PLAVIX) 75 MG tablet, Take 75 mg by mouth daily On hold resume 4/27/22 after tooth extractions., Disp: , Rfl:      furosemide (LASIX) 20 MG tablet, Take 40 mg by mouth 2 times daily, Disp: , Rfl:      insulin  UNIT/ML injection, Inject 18 Units Subcutaneous daily before breakfast, Disp: , Rfl:      insulin  UNIT/ML injection, Inject 5 Units Subcutaneous every evening Before dinner, Disp: , Rfl:      insulin regular (HUMULIN R VIAL) 100 UNIT/ML vial, Inject 2 Units Subcutaneous 2 times daily Also inject Per Sliding Scale; If Blood Sugar is 150 to 199, give 1 Units. If Blood Sugar is 200 to 249, give 2 Units. If Blood Sugar is 250 to 299, give 3 Units. If Blood Sugar is 300 to 349, give 4 Units. If Blood Sugar is 350 to 399, give 5 Units. If Blood Sugar is greater than 399, give 6 Units. If Blood Sugar is greater than 399, call MD. Subcutaneous Before Meals and At Bedtime, Disp: , Rfl:      methocarbamol (ROBAXIN) 500 MG tablet, Take 250 mg by  "mouth daily, Disp: , Rfl:      senna-docusate (SENOKOT-S/PERICOLACE) 8.6-50 MG tablet, Take 2 tablets by mouth 2 times daily, Disp: , Rfl:      Simvastatin (ZOCOR PO), Take 40 mg by mouth At Bedtime , Disp: , Rfl:      tamsulosin (FLOMAX) 0.4 MG capsule, Take 0.4 mg by mouth daily , Disp: , Rfl:   Allergies   Allergen Reactions     Iodine      nausea     Statin Drugs [Hmg-Coa-R Inhibitors]      cough     Atorvastatin Other (See Comments) and Muscle Pain (Myalgia)     Body aches     Contrast Dye Nausea and Vomiting     Lisinopril Other (See Comments) and Cough     Cough         All Meds and Allergies reviewed in the record at the facility and is the most up-to-date    REVIEW OF SYSTEMS:   Review of Systems  No fevers or chills. No headache, lightheadedness or dizziness. SOB improving, no chest pains or palpitations. Appetite fair.  + Dysphagia.  No nausea, vomiting, constipation or diarrhea. No dysuria, frequency, burning or pain with urination.  Pain in his left knee controlled with Tylenol.Poor dentition with difficulty chewing post tooth extractions.     PHYSICAL EXAMINATION:   Physical Exam     Vital signs: /75   Pulse 110   Temp 97  F (36.1  C)   Resp 22   Ht 1.702 m (5' 7\")   Wt 81.3 kg (179 lb 3.2 oz)   SpO2 100%   BMI 28.07 kg/m    General: Awake, Alert, oriented x3,  follows simple commands  HEENT: Pink conjunctiva, anicteric sclerae, moist oral mucosa.  Upper tooth extractions with sutures in place.  Serosanguineous drainage to gauze noted.  NECK: Supple  CVS:  S1  S2, without murmur or gallop.   LUNG: No wheezes rales or rhonchi.  No cough on exam.  BACK: No kyphosis of the thoracic spine  ABDOMEN: Soft, round, nontender to palpation, with positive bowel sounds  EXTREMITIES: Moves both upper and lower extremities.  Now able to fully extend left knee.  Trace lower extremity edema. No redness or warmth of the left knee.  Slight outpouching of the left knee.  SKIN: Warm and dry  NEUROLOGIC: " Intact, pulses palpable  PSYCHIATRIC: Flat affect.      Labs:  All labs reviewed in the nursing home record and Epic   @  Lab Results   Component Value Date    WBC 10.5 03/21/2022    WBC 8.2 02/18/2021     Lab Results   Component Value Date    RBC 3.68 03/21/2022    RBC 3.96 02/18/2021     Lab Results   Component Value Date    HGB 10.8 03/21/2022    HGB 12.4 02/18/2021     Lab Results   Component Value Date    HCT 33.8 03/21/2022    HCT 36.8 02/18/2021     Lab Results   Component Value Date    MCV 92 03/21/2022    MCV 93 02/18/2021     Lab Results   Component Value Date    MCH 29.3 03/21/2022    MCH 31.3 02/18/2021     Lab Results   Component Value Date    MCHC 32.0 03/21/2022    MCHC 33.7 02/18/2021     Lab Results   Component Value Date    RDW 13.2 03/21/2022    RDW 14.0 02/18/2021     Lab Results   Component Value Date     03/21/2022     02/18/2021        @Last Comprehensive Metabolic Panel:  Sodium   Date Value Ref Range Status   04/19/2022 138 136 - 145 mmol/L Final   02/18/2021 138 133 - 144 mmol/L Final     Potassium   Date Value Ref Range Status   04/19/2022 3.6 3.5 - 5.0 mmol/L Final   02/18/2021 3.7 3.4 - 5.3 mmol/L Final     Chloride   Date Value Ref Range Status   04/19/2022 95 (L) 98 - 107 mmol/L Final   02/18/2021 101 94 - 109 mmol/L Final     Carbon Dioxide   Date Value Ref Range Status   02/18/2021 33 (H) 20 - 32 mmol/L Final     Carbon Dioxide (CO2)   Date Value Ref Range Status   04/19/2022 32 (H) 22 - 31 mmol/L Final     Anion Gap   Date Value Ref Range Status   04/19/2022 11 5 - 18 mmol/L Final   02/18/2021 4 3 - 14 mmol/L Final     Glucose   Date Value Ref Range Status   04/19/2022 132 (H) 70 - 125 mg/dL Final   02/18/2021 159 (H) 70 - 99 mg/dL Final     Urea Nitrogen   Date Value Ref Range Status   04/19/2022 22 8 - 28 mg/dL Final   02/18/2021 19 7 - 30 mg/dL Final     Creatinine   Date Value Ref Range Status   04/19/2022 0.97 0.70 - 1.30 mg/dL Final   02/18/2021 0.99 0.66 - 1.25  mg/dL Final     GFR Estimate   Date Value Ref Range Status   04/19/2022 74 >60 mL/min/1.73m2 Final     Comment:     Effective December 21, 2021 eGFRcr in adults is calculated using the 2021 CKD-EPI creatinine equation which includes age and gender (Dirk graham al., [place], DOI: 10.1056/FJRGug5153500)   02/18/2021 67 >60 mL/min/[1.73_m2] Final     Comment:     Non  GFR Calc  Starting 12/18/2018, serum creatinine based estimated GFR (eGFR) will be   calculated using the Chronic Kidney Disease Epidemiology Collaboration   (CKD-EPI) equation.       Calcium   Date Value Ref Range Status   04/19/2022 8.8 8.5 - 10.5 mg/dL Final   02/18/2021 8.8 8.5 - 10.1 mg/dL Final         Assessment/Plan:    ICD-10-CM    1.   Acute on chronic congestive heart failure   patient continues on Lasix 40 mg twice daily.  He is on a low-sodium diet.  Continue daily weights.  Follow-up BMP on Wednesday unremarkable.   2. Pyogenic arthritis of left knee joint, due to unspecified organism (H)  M00.9  patient has completed his IM Rocephin.  He continues on amoxicillin until follow-up with infectious disease on 4/25/2022.  He should continue oral antibiotics following tooth extractions until 5/3/2022.    2. Poor dentition requiring referral to dentistry  Z76.89 Recent lower gum tooth extractions on 4/1/2022.  Patient underwent upper tooth extractions today on 4/27/2022.  Xarelto and Plavix currently on hold.  Okay to resume in the a.m.  Continues on oral antibiotics.  Continues on Tylenol for pain increase to every 6 hours scheduled.   3.   Aspiration pneumonia  He has completed his doxycyline.   Followed by ST. Diet recently up graded to soft diet.   Resume puréed diet until cleared by speech.      4. Aortic valve stenosis, etiology of cardiac valve disease unspecified  I35.0  continues on Xarelto.   5. Type 2 diabetes mellitus with diabetic peripheral angiopathy and gangrene, with long-term current use of insulin (H)  E11.52  Recently  NPH increased in the am to 18 units. Continues on NPH 5 units in the evening and sliding scale.  Continue to monitor 4 times daily.           This note has been dictated using voice recognition software. Any grammatical or context distortions are unintentional and inherent to the software    Electronically signed by: Barbara Stark CNP

## 2022-05-03 NOTE — PROGRESS NOTES
[facility] GERIATRIC SERVICES    Code Status:  FULL CODE   Visit Type:   Chief Complaint   Patient presents with     Discharge Summary Nursing Home     Facility:  [facility] ([facility]) [[id number]]           History of Present Illness: Jhoan Curran is a 89 year old male who I am seeing today for for discharge from the TCU.  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  Patient recently hospitalized on 4/11/2022.  Patient was at the TCU recovering from septic pyrogenic arthritis of the left knee.  He had been receiving IV antibiotics.  He underwent tooth extraction on 4/1/2022 and developed aspiration pneumonia post extractions.  He was receiving IV antibiotics and doxycycline had been added to his regimen at the facility.  He does have a history of underlying congestive heart failure and developed acute on chronic congestive heart failure.  Patient son wanted him sent in.  He had been placed on oral diuretics.  He was hospitalized and treated for acute on chronic CHF.  On 4/7 BNP greater than 1100.  Upon admit to the ED BNP 1171.  Chest x-ray showed pulmonary opacities representing atelectasis versus pneumonia as well as trace bilateral effusions.  No fever or leukocytosis.  Patient given IV Zosyn.  He had elevated lactate at 2.4 and was given 500 mL of fluid.  He was treated with IV diuretic and transitioned to oral Lasix 40 mg twice daily.  He remained on room air.    Today pt lying in bed.  On 4/27/2022 patient underwent tooth extractions from the top gum.  Sutures in place.  His diet was downgraded again to puréed diet.  He continues on speech therapy.  Today he was attempting to chew up a waffle.  He did have some difficulty however no  evidence of aspiration per speech therapy.  Recommendation is to discharge home with puréed diet and patient to increase diet as tolerated.  Anticoagulants have been resumed.    Poor dentition with tooth extractions.  Patient underwent lower tooth extractions on 4/1/2022. Patient underwent upper tooth extractions on 4/27/2022.  Sutures in place.  Anticoagulant and Plavix currently initially held however been resumed.  Continues on puréed diet.     Dysphagia.  Patient evaluated by speech therapy.  It was recommended he undergo esophagram.  There was concern for esophageal stricture because of his dysphagia.  Esophagram showed only esophageal dysmotility.  Patient with a history of aspiration pneumonia post original tooth extractions.  Patient diet recently upgraded to mechanical soft however will resume puréed diet over the next several days.  Okay to increase as tolerated at home.  Speech therapy to continue to follow with recommendations.    Septic arthritis of the left knee.  Patient underwent arthrocentesis which showed gram-positive cocci.  3/13/2022. Blood culture on 3/13  positive for gram-positive cocci.  Vascular surgeon and ID were consulted.  Patient started on vancomycin on 3/13 and underwent I&D of the left knee on 3/13.  On 3/17 patient complained of increased pain in the left lower extremity just above the knee to his upper thigh.  He had a venous Doppler ultrasound which was negative for DVT.  Patient high risk for endocarditis with a history of aortic stenosis.  Patient underwent VEE on 3/16.  During most recent hospitalization ID was consulted.  Last dose of ceftriaxone was on 4/15/2022.  Patient was switched to oral amoxicillin until seen by infectious disease on 4/25/2022.  Last date of antibiotics is 5/3/2022.    Aortic valve stenosis on chronic anticoagulation with Xarelto.  Xarelto recently held for tooth extractions however has been resumed.    Diabetes mellitus type 2 chronically on insulin.   Insulin changed during recent hospitalization.  Recent increase in his NPH.  Blood sugars slightly improved.    Acute on chronic diastolic congestive heart failure.  Patient previously on Lasix 40 mg twice daily.  He was rehospitalized during his TCU stay secondary to CHF exacerbation.  He was seen by cardiology on 4/28/2022 and Lasix discontinued.  He does have this as needed.  Weight is down about 10 pounds since admit.  Trace lower extremity edema.  Patient denies any shortness of breath.  No chest pain.        Active Ambulatory Problems     Diagnosis Date Noted     Foreskin does not retract 02/11/2015     Elevated troponin 02/15/2021     Shortness of breath 02/15/2021     Elevated brain natriuretic peptide (BNP) level 02/15/2021     PVD (peripheral vascular disease) (H) 05/03/2007     Coronary artery disease involving native coronary artery of native heart without angina pectoris 02/15/2021     Acute heart failure with preserved ejection fraction (HFpEF) (H) 02/15/2021     Bilateral pleural effusion 02/15/2021     COVID-19 ruled out 02/15/2021     Overflow incontinence of urine 09/14/2018     Hyperlipidemia LDL goal <100      Diabetes mellitus, type 2 (H)      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) 05/03/2007     Gangrene of toe of right foot (H) 02/10/2021     Critical lower limb ischemia (H) 02/04/2021     Right bundle branch block 05/03/2007     Acute respiratory failure with hypoxia and hypercapnia (H) 02/15/2021     Pulmonary edema 02/15/2021     Cough 03/22/2022     Calculus of kidney 05/03/2007     Calcium pyrophosphate deposition disease (CPDD) of knee 03/14/2022     Benign neoplasm of colon 05/03/2007     Aortic valve stenosis, etiology of cardiac valve disease unspecified 05/03/2007     Neurogenic claudication (H) 06/23/2015     Microalbuminuria due to type 2 diabetes mellitus (H) 02/09/2022     Mobitz (type) I (Wenckebach's) atrioventricular block 05/10/2021     Hyperlipidemia  associated with type 2 diabetes mellitus (H) 05/03/2007     Hx of cardiac pacemaker 03/12/2022     History of amputation of great toe (H) 02/08/2022     Glaucoma 11/23/2007     Gastroesophageal reflux disease without esophagitis 11/23/2007     SILVANO (obstructive sleep apnea) 08/25/2011     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) 02/10/2021     Pyogenic arthritis of left knee joint (H) 03/12/2022     Type 2 diabetes mellitus with diabetic peripheral angiopathy and gangrene, with long-term current use of insulin (H) 06/22/2015     Vitamin D deficiency 03/02/2022     Pain 03/29/2022     Muscle stiffness 04/07/2022     Poor dentition requiring referral to dentistry 04/07/2022     Aspiration pneumonia (H) 04/07/2022     Acute on chronic systolic congestive heart failure (H) 04/21/2022     Hyponatremia 04/11/2022     Atrial fibrillation (H) 04/11/2022     Resolved Ambulatory Problems     Diagnosis Date Noted     No Resolved Ambulatory Problems     Past Medical History:   Diagnosis Date     Aortic stenosis      Atherosclerosis of right lower extremity with gangrene (H)      CAD (coronary artery disease)      HTN (hypertension)      Overflow incontinence      PAD (peripheral artery disease) (H)      RBBB        Current Outpatient Medications:      acetaminophen (TYLENOL) 500 MG tablet, Take 1,000 mg by mouth 3 times daily , Disp: , Rfl:      amoxicillin (AMOXIL) 500 MG capsule, Take 500 mg by mouth 3 times daily, Disp: , Rfl:      apixaban ANTICOAGULANT (ELIQUIS) 2.5 MG tablet, Take 5 mg by mouth 2 times daily, Disp: , Rfl:      carvedilol (COREG) 6.25 MG tablet, Take 0.5 tablets (3.125 mg) by mouth 2 times daily (with meals), Disp: 30 tablet, Rfl: 0     clopidogrel (PLAVIX) 75 MG tablet, Take 75 mg by mouth daily, Disp: , Rfl:      furosemide (LASIX) 20 MG tablet, Take 40 mg by mouth 2 times daily, Disp: , Rfl:      insulin  UNIT/ML injection, Inject 18 Units Subcutaneous daily before breakfast, Disp:  ", Rfl:      insulin  UNIT/ML injection, Inject 5 Units Subcutaneous every evening Before dinner, Disp: , Rfl:      insulin regular (HUMULIN R VIAL) 100 UNIT/ML vial, Inject 2 Units Subcutaneous 2 times daily Also inject Per Sliding Scale; If Blood Sugar is 150 to 199, give 1 Units. If Blood Sugar is 200 to 249, give 2 Units. If Blood Sugar is 250 to 299, give 3 Units. If Blood Sugar is 300 to 349, give 4 Units. If Blood Sugar is 350 to 399, give 5 Units. If Blood Sugar is greater than 399, give 6 Units. If Blood Sugar is greater than 399, call MD. Subcutaneous Before Meals and At Bedtime, Disp: , Rfl:      methocarbamol (ROBAXIN) 500 MG tablet, Take 250 mg by mouth daily, Disp: , Rfl:      senna-docusate (SENOKOT-S/PERICOLACE) 8.6-50 MG tablet, Take 2 tablets by mouth 2 times daily, Disp: , Rfl:      Simvastatin (ZOCOR PO), Take 40 mg by mouth At Bedtime , Disp: , Rfl:      tamsulosin (FLOMAX) 0.4 MG capsule, Take 0.4 mg by mouth daily , Disp: , Rfl:   Allergies   Allergen Reactions     Iodine      nausea     Statin Drugs [Hmg-Coa-R Inhibitors]      cough     Atorvastatin Other (See Comments) and Muscle Pain (Myalgia)     Body aches     Contrast Dye Nausea and Vomiting     Lisinopril Other (See Comments) and Cough     Cough         All Meds and Allergies reviewed in the record at the facility and is the most up-to-date    REVIEW OF SYSTEMS:   Review of Systems  No fevers or chills. No headache, lightheadedness or dizziness. SOB improving, no chest pains or palpitations. Appetite fair.  + Dysphagia.  No nausea, vomiting, constipation or diarrhea. No dysuria, frequency, burning or pain with urination.  Pain in his left knee controlled with Tylenol.Poor dentition with difficulty chewing post tooth extractions.     PHYSICAL EXAMINATION:   Physical Exam     Vital signs: /83   Pulse 96   Temp 97.1  F (36.2  C)   Resp 18   Ht 1.702 m (5' 7\")   Wt 80.3 kg (177 lb)   SpO2 98%   BMI 27.72 kg/m    General: " Awake, Alert, oriented x3,  follows simple commands  HEENT: Pink conjunctiva, anicteric sclerae, moist oral mucosa.  Upper tooth extractions with sutures in place.  Serosanguineous drainage to gauze noted.  NECK: Supple  CVS:  S1  S2, without murmur or gallop.   LUNG: No wheezes rales or rhonchi.  No cough on exam.  BACK: No kyphosis of the thoracic spine  ABDOMEN: Soft, round, nontender to palpation, with positive bowel sounds  EXTREMITIES: Moves both upper and lower extremities.  Now able to fully extend left knee.  Trace lower extremity edema. No redness or warmth of the left knee.  Slight outpouching of the left knee.  SKIN: Warm and dry  NEUROLOGIC: Intact, pulses palpable  PSYCHIATRIC: Flat affect.      Labs:  All labs reviewed in the nursing home record and Epic   @  Lab Results   Component Value Date    WBC 10.5 03/21/2022    WBC 8.2 02/18/2021     Lab Results   Component Value Date    RBC 3.68 03/21/2022    RBC 3.96 02/18/2021     Lab Results   Component Value Date    HGB 10.8 03/21/2022    HGB 12.4 02/18/2021     Lab Results   Component Value Date    HCT 33.8 03/21/2022    HCT 36.8 02/18/2021     Lab Results   Component Value Date    MCV 92 03/21/2022    MCV 93 02/18/2021     Lab Results   Component Value Date    MCH 29.3 03/21/2022    MCH 31.3 02/18/2021     Lab Results   Component Value Date    MCHC 32.0 03/21/2022    MCHC 33.7 02/18/2021     Lab Results   Component Value Date    RDW 13.2 03/21/2022    RDW 14.0 02/18/2021     Lab Results   Component Value Date     03/21/2022     02/18/2021        @Last Comprehensive Metabolic Panel:  Sodium   Date Value Ref Range Status   04/19/2022 138 136 - 145 mmol/L Final   02/18/2021 138 133 - 144 mmol/L Final     Potassium   Date Value Ref Range Status   04/19/2022 3.6 3.5 - 5.0 mmol/L Final   02/18/2021 3.7 3.4 - 5.3 mmol/L Final     Chloride   Date Value Ref Range Status   04/19/2022 95 (L) 98 - 107 mmol/L Final   02/18/2021 101 94 - 109 mmol/L  Final     Carbon Dioxide   Date Value Ref Range Status   02/18/2021 33 (H) 20 - 32 mmol/L Final     Carbon Dioxide (CO2)   Date Value Ref Range Status   04/19/2022 32 (H) 22 - 31 mmol/L Final     Anion Gap   Date Value Ref Range Status   04/19/2022 11 5 - 18 mmol/L Final   02/18/2021 4 3 - 14 mmol/L Final     Glucose   Date Value Ref Range Status   04/19/2022 132 (H) 70 - 125 mg/dL Final   02/18/2021 159 (H) 70 - 99 mg/dL Final     Urea Nitrogen   Date Value Ref Range Status   04/19/2022 22 8 - 28 mg/dL Final   02/18/2021 19 7 - 30 mg/dL Final     Creatinine   Date Value Ref Range Status   04/19/2022 0.97 0.70 - 1.30 mg/dL Final   02/18/2021 0.99 0.66 - 1.25 mg/dL Final     GFR Estimate   Date Value Ref Range Status   04/19/2022 74 >60 mL/min/1.73m2 Final     Comment:     Effective December 21, 2021 eGFRcr in adults is calculated using the 2021 CKD-EPI creatinine equation which includes age and gender (Dirk et al., NEJM, DOI: 10.1056/KVPHpn1728765)   02/18/2021 67 >60 mL/min/[1.73_m2] Final     Comment:     Non  GFR Calc  Starting 12/18/2018, serum creatinine based estimated GFR (eGFR) will be   calculated using the Chronic Kidney Disease Epidemiology Collaboration   (CKD-EPI) equation.       Calcium   Date Value Ref Range Status   04/19/2022 8.8 8.5 - 10.5 mg/dL Final   02/18/2021 8.8 8.5 - 10.1 mg/dL Final         Assessment/Plan:    ICD-10-CM    1.   Acute on chronic congestive heart failure   patient recently seen by cardiology on 4/28/2022.  Lasix changed to 40 mg twice daily as needed.  Weight down about 10 pounds since admit.  Follow-up with cardiology on 5/19/2022.   2. Pyogenic arthritis of left knee joint, due to unspecified organism (H)  M00.9  patient has completed his IM Rocephin.  He should continue oral antibiotics following tooth extractions until 5/3/2022.   Follow-up with vascular on 5/19/2022.   2. Poor dentition requiring referral to dentistry  Z76.89 Recent lower gum tooth  extractions on 4/1/2022.  Patient underwent upper tooth extractions today on 4/27/2022.  Xarelto and Plavix initially held but resumed.  Continues on oral antibiotics until 5/3/2022.  Continues on Tylenol for pain increase to every 6 hours scheduled.  Diet downgraded to puréed for ease of chewing.  He can increase his diet as tolerated.   Follow-up with dentistry for dentures.   3.   Aspiration pneumonia   patient briefly treated with doxycycline.  Followed by ST.  Diet downgraded post tooth extractions.  See above.     4. Aortic valve stenosis, etiology of cardiac valve disease unspecified  I35.0  continues on Xarelto.   5. Type 2 diabetes mellitus with diabetic peripheral angiopathy and gangrene, with long-term current use of insulin (H)  E11.52  Recently NPH increased in the am to 18 units. Continues on NPH 5 units in the evening and sliding scale.  Continue to monitor 4 times daily.     Okay to DC home with current meds and treatments.  Home PT, OT, home health aide and RN for medication management.  Follow-up with primary care provider in 1 week.  Follow-up outpatient with cardiology on 5/19/2022.  Follow-up with vascular on 5/19/2022.    DISCHARGE PLAN/FACE TO FACE:  I certify that this patient is under my care and that I, or a nurse practitioner or physician's assistant working with me, had a face-to-face encounter that meets the physician face-to-face encounter requirements with this patient.       I certify that, based on my findings, the following services are medically necessary home health services.    My clinical findings support the need for the above skilled services.    This patient is homebound because: Recent hospitalization for acute on chronic congestive heart failure, septic arthritis.    The patient is, or has been, under my care and I have initiated the establishment of the plan of care. This patient will be followed by a physician who will periodically review the plan of care.    Total time  spent for this visit was 45 minutes with greater than 50% of time spent face-to-face patient reviewing discharge medications, home care services and follow-ups as well as collaborating with nursing staff and speech therapist.    This note has been dictated using voice recognition software. Any grammatical or context distortions are unintentional and inherent to the software    Electronically signed by: Barbara Stark CNP

## (undated) RX ORDER — TRIAMCINOLONE ACETONIDE 40 MG/ML
INJECTION, SUSPENSION INTRA-ARTICULAR; INTRAMUSCULAR
Status: DISPENSED
Start: 2019-02-06

## (undated) RX ORDER — LIDOCAINE HYDROCHLORIDE 10 MG/ML
INJECTION, SOLUTION EPIDURAL; INFILTRATION; INTRACAUDAL; PERINEURAL
Status: DISPENSED
Start: 2019-02-06

## (undated) RX ORDER — BUPIVACAINE HYDROCHLORIDE 5 MG/ML
INJECTION, SOLUTION EPIDURAL; INTRACAUDAL
Status: DISPENSED
Start: 2019-02-06